# Patient Record
Sex: MALE | Race: WHITE | Employment: OTHER | URBAN - METROPOLITAN AREA
[De-identification: names, ages, dates, MRNs, and addresses within clinical notes are randomized per-mention and may not be internally consistent; named-entity substitution may affect disease eponyms.]

---

## 2017-01-10 ENCOUNTER — OFFICE VISIT (OUTPATIENT)
Dept: FAMILY MEDICINE CLINIC | Age: 77
End: 2017-01-10

## 2017-01-10 VITALS
DIASTOLIC BLOOD PRESSURE: 64 MMHG | HEART RATE: 79 BPM | HEIGHT: 67 IN | BODY MASS INDEX: 31.71 KG/M2 | RESPIRATION RATE: 16 BRPM | OXYGEN SATURATION: 97 % | SYSTOLIC BLOOD PRESSURE: 118 MMHG | WEIGHT: 202 LBS

## 2017-01-10 DIAGNOSIS — D32.9 MENINGIOMA (HCC): ICD-10-CM

## 2017-01-10 DIAGNOSIS — M51.36 DDD (DEGENERATIVE DISC DISEASE), LUMBAR: Chronic | ICD-10-CM

## 2017-01-10 DIAGNOSIS — E78.2 MIXED HYPERTRIGLYCERIDEMIA: Chronic | ICD-10-CM

## 2017-01-10 DIAGNOSIS — D64.89 ANEMIA DUE TO OTHER CAUSE, NOT CLASSIFIED: Chronic | ICD-10-CM

## 2017-01-10 DIAGNOSIS — K21.9 GASTROESOPHAGEAL REFLUX DISEASE WITHOUT ESOPHAGITIS: Chronic | ICD-10-CM

## 2017-01-10 DIAGNOSIS — I10 BENIGN ESSENTIAL HYPERTENSION: Primary | Chronic | ICD-10-CM

## 2017-01-10 DIAGNOSIS — D70.9 NEUTROPENIA, UNSPECIFIED TYPE (HCC): Chronic | ICD-10-CM

## 2017-01-10 DIAGNOSIS — R73.01 IMPAIRED FASTING GLUCOSE: Chronic | ICD-10-CM

## 2017-01-10 PROCEDURE — 3288F FALL RISK ASSESSMENT DOCD: CPT | Performed by: FAMILY MEDICINE

## 2017-01-10 PROCEDURE — 99214 OFFICE O/P EST MOD 30 MIN: CPT | Performed by: FAMILY MEDICINE

## 2017-01-10 RX ORDER — RANITIDINE 150 MG/1
150 TABLET ORAL 2 TIMES DAILY
COMMUNITY
End: 2018-01-16 | Stop reason: ALTCHOICE

## 2017-01-20 DIAGNOSIS — I10 BENIGN ESSENTIAL HYPERTENSION: Chronic | ICD-10-CM

## 2017-01-20 RX ORDER — AMLODIPINE BESYLATE 5 MG/1
TABLET ORAL
Qty: 90 TABLET | Refills: 0 | Status: SHIPPED | OUTPATIENT
Start: 2017-01-20 | End: 2017-08-07 | Stop reason: SDUPTHER

## 2017-01-20 RX ORDER — LISINOPRIL 40 MG/1
TABLET ORAL
Qty: 90 TABLET | Refills: 1 | Status: SHIPPED | OUTPATIENT
Start: 2017-01-20 | End: 2017-10-02 | Stop reason: SDUPTHER

## 2017-01-20 RX ORDER — HYDROCHLOROTHIAZIDE 25 MG/1
TABLET ORAL
Qty: 90 TABLET | Refills: 0 | Status: SHIPPED | OUTPATIENT
Start: 2017-01-20 | End: 2017-08-07 | Stop reason: SDUPTHER

## 2017-01-23 ENCOUNTER — TELEPHONE (OUTPATIENT)
Dept: CARDIOLOGY CLINIC | Age: 77
End: 2017-01-23

## 2017-03-16 RX ORDER — TRIAMCINOLONE ACETONIDE 1 MG/G
CREAM TOPICAL
Qty: 454 G | Refills: 0 | Status: SHIPPED | OUTPATIENT
Start: 2017-03-16 | End: 2017-06-21 | Stop reason: SDUPTHER

## 2017-06-21 RX ORDER — TRIAMCINOLONE ACETONIDE 1 MG/G
CREAM TOPICAL
Qty: 454 G | Refills: 0 | Status: SHIPPED | OUTPATIENT
Start: 2017-06-21 | End: 2017-08-30 | Stop reason: SDUPTHER

## 2017-06-22 ENCOUNTER — TELEPHONE (OUTPATIENT)
Dept: FAMILY MEDICINE CLINIC | Age: 77
End: 2017-06-22

## 2017-06-22 DIAGNOSIS — N41.9 INFLAMMATORY DISEASE OF PROSTATE: ICD-10-CM

## 2017-06-22 DIAGNOSIS — N41.1 CHRONIC PROSTATITIS: ICD-10-CM

## 2017-06-22 DIAGNOSIS — Z12.5 PROSTATE CANCER SCREENING: ICD-10-CM

## 2017-06-22 DIAGNOSIS — N40.0 BENIGN PROSTATIC HYPERPLASIA, PRESENCE OF LOWER URINARY TRACT SYMPTOMS UNSPECIFIED, UNSPECIFIED MORPHOLOGY: Primary | Chronic | ICD-10-CM

## 2017-06-27 ENCOUNTER — OFFICE VISIT (OUTPATIENT)
Dept: NEUROLOGY | Age: 77
End: 2017-06-27

## 2017-06-27 VITALS
HEIGHT: 67 IN | SYSTOLIC BLOOD PRESSURE: 126 MMHG | BODY MASS INDEX: 31.55 KG/M2 | WEIGHT: 201 LBS | HEART RATE: 80 BPM | DIASTOLIC BLOOD PRESSURE: 82 MMHG

## 2017-06-27 DIAGNOSIS — G25.9 EXTRAPYRAMIDAL SYNDROME: Primary | ICD-10-CM

## 2017-06-27 DIAGNOSIS — D32.9 MENINGIOMA (HCC): Chronic | ICD-10-CM

## 2017-06-27 DIAGNOSIS — G31.84 MILD COGNITIVE IMPAIRMENT, SO STATED: ICD-10-CM

## 2017-06-27 PROCEDURE — 99214 OFFICE O/P EST MOD 30 MIN: CPT | Performed by: PSYCHIATRY & NEUROLOGY

## 2017-06-27 RX ORDER — CARBIDOPA AND LEVODOPA 25; 100 MG/1; MG/1
TABLET, EXTENDED RELEASE ORAL
Qty: 360 TABLET | Refills: 0 | Status: SHIPPED | OUTPATIENT
Start: 2017-06-27 | End: 2017-10-09 | Stop reason: SDUPTHER

## 2017-06-28 ENCOUNTER — NURSE ONLY (OUTPATIENT)
Dept: FAMILY MEDICINE CLINIC | Age: 77
End: 2017-06-28

## 2017-06-28 DIAGNOSIS — N41.1 CHRONIC PROSTATITIS: ICD-10-CM

## 2017-06-28 DIAGNOSIS — N41.9 INFLAMMATORY DISEASE OF PROSTATE: ICD-10-CM

## 2017-06-28 LAB — PROSTATE SPECIFIC ANTIGEN: 1.89 NG/ML (ref 0–4)

## 2017-06-28 PROCEDURE — 36415 COLL VENOUS BLD VENIPUNCTURE: CPT | Performed by: FAMILY MEDICINE

## 2017-06-29 ENCOUNTER — TELEPHONE (OUTPATIENT)
Dept: FAMILY MEDICINE CLINIC | Age: 77
End: 2017-06-29

## 2017-06-29 DIAGNOSIS — L11.1 GROVER'S DISEASE: Primary | Chronic | ICD-10-CM

## 2017-06-30 ENCOUNTER — TELEPHONE (OUTPATIENT)
Dept: FAMILY MEDICINE CLINIC | Age: 77
End: 2017-06-30

## 2017-07-05 ENCOUNTER — TELEPHONE (OUTPATIENT)
Dept: FAMILY MEDICINE CLINIC | Age: 77
End: 2017-07-05

## 2017-07-12 ENCOUNTER — OFFICE VISIT (OUTPATIENT)
Dept: FAMILY MEDICINE CLINIC | Age: 77
End: 2017-07-12

## 2017-07-12 VITALS
BODY MASS INDEX: 31.2 KG/M2 | SYSTOLIC BLOOD PRESSURE: 128 MMHG | HEART RATE: 70 BPM | DIASTOLIC BLOOD PRESSURE: 74 MMHG | HEIGHT: 67 IN | WEIGHT: 198.8 LBS | RESPIRATION RATE: 16 BRPM | OXYGEN SATURATION: 98 %

## 2017-07-12 DIAGNOSIS — G31.84 MILD COGNITIVE IMPAIRMENT, SO STATED: ICD-10-CM

## 2017-07-12 DIAGNOSIS — J45.901 ASTHMATIC BRONCHITIS, UNSPECIFIED ASTHMA SEVERITY, WITH ACUTE EXACERBATION: ICD-10-CM

## 2017-07-12 DIAGNOSIS — E78.2 MIXED HYPERTRIGLYCERIDEMIA: Chronic | ICD-10-CM

## 2017-07-12 DIAGNOSIS — G25.9 EXTRAPYRAMIDAL SYNDROME: Chronic | ICD-10-CM

## 2017-07-12 DIAGNOSIS — R73.01 IMPAIRED FASTING GLUCOSE: Chronic | ICD-10-CM

## 2017-07-12 DIAGNOSIS — K21.9 GASTROESOPHAGEAL REFLUX DISEASE WITHOUT ESOPHAGITIS: Chronic | ICD-10-CM

## 2017-07-12 DIAGNOSIS — I10 BENIGN ESSENTIAL HYPERTENSION: Chronic | ICD-10-CM

## 2017-07-12 DIAGNOSIS — D64.89 ANEMIA DUE TO OTHER CAUSE, NOT CLASSIFIED: Primary | Chronic | ICD-10-CM

## 2017-07-12 DIAGNOSIS — D70.9 NEUTROPENIA, UNSPECIFIED TYPE (HCC): Chronic | ICD-10-CM

## 2017-07-12 DIAGNOSIS — Z91.81 AT RISK FOR FALL DUE TO COMORBID CONDITION: ICD-10-CM

## 2017-07-12 PROCEDURE — 99214 OFFICE O/P EST MOD 30 MIN: CPT | Performed by: FAMILY MEDICINE

## 2017-07-12 ASSESSMENT — ENCOUNTER SYMPTOMS
SHORTNESS OF BREATH: 0
CHOKING: 0
CHEST TIGHTNESS: 0
COUGH: 0
WHEEZING: 1

## 2017-07-12 ASSESSMENT — PATIENT HEALTH QUESTIONNAIRE - PHQ9
1. LITTLE INTEREST OR PLEASURE IN DOING THINGS: 0
SUM OF ALL RESPONSES TO PHQ QUESTIONS 1-9: 0
SUM OF ALL RESPONSES TO PHQ9 QUESTIONS 1 & 2: 0
2. FEELING DOWN, DEPRESSED OR HOPELESS: 0

## 2017-07-17 ENCOUNTER — HOSPITAL ENCOUNTER (OUTPATIENT)
Dept: OTHER | Age: 77
Discharge: HOME | End: 2017-07-17
Attending: FAMILY MEDICINE | Admitting: FAMILY MEDICINE

## 2017-07-17 DIAGNOSIS — D64.89 ANEMIA DUE TO OTHER CAUSE, NOT CLASSIFIED: Chronic | ICD-10-CM

## 2017-07-17 DIAGNOSIS — R73.01 IMPAIRED FASTING GLUCOSE: Chronic | ICD-10-CM

## 2017-07-17 DIAGNOSIS — E78.2 MIXED HYPERTRIGLYCERIDEMIA: Chronic | ICD-10-CM

## 2017-07-17 DIAGNOSIS — I10 BENIGN ESSENTIAL HYPERTENSION: Chronic | ICD-10-CM

## 2017-07-17 DIAGNOSIS — D70.9 NEUTROPENIA, UNSPECIFIED TYPE (HCC): Chronic | ICD-10-CM

## 2017-07-17 DIAGNOSIS — G31.84 MILD COGNITIVE IMPAIRMENT, SO STATED: ICD-10-CM

## 2017-07-17 LAB
A/G RATIO: 1.7 (ref 1.1–2.2)
ALBUMIN SERPL-MCNC: 4 G/DL (ref 3.4–5)
ALP BLD-CCNC: 58 U/L (ref 40–129)
ALT SERPL-CCNC: 19 U/L (ref 10–40)
ANION GAP SERPL CALCULATED.3IONS-SCNC: 12 MMOL/L (ref 3–16)
AST SERPL-CCNC: 19 U/L (ref 15–37)
BASOPHILS ABSOLUTE: 0 K/UL (ref 0–0.2)
BASOPHILS RELATIVE PERCENT: 0.4 %
BILIRUB SERPL-MCNC: 0.6 MG/DL (ref 0–1)
BUN BLDV-MCNC: 16 MG/DL (ref 7–20)
CALCIUM SERPL-MCNC: 9.3 MG/DL (ref 8.3–10.6)
CHLORIDE BLD-SCNC: 101 MMOL/L (ref 99–110)
CHOLESTEROL, TOTAL: 169 MG/DL (ref 0–199)
CO2: 28 MMOL/L (ref 21–32)
CREAT SERPL-MCNC: 1 MG/DL (ref 0.8–1.3)
EOSINOPHILS ABSOLUTE: 0.2 K/UL (ref 0–0.6)
EOSINOPHILS RELATIVE PERCENT: 2.5 %
GFR AFRICAN AMERICAN: >60
GFR NON-AFRICAN AMERICAN: >60
GLOBULIN: 2.4 G/DL
GLUCOSE BLD-MCNC: 118 MG/DL (ref 70–99)
HCT VFR BLD CALC: 44.4 % (ref 40.5–52.5)
HDLC SERPL-MCNC: 73 MG/DL (ref 40–60)
HEMOGLOBIN: 15.2 G/DL (ref 13.5–17.5)
HOMOCYSTEINE: 9 UMOL/L (ref 0–10)
LDL CHOLESTEROL CALCULATED: 74 MG/DL
LYMPHOCYTES ABSOLUTE: 2 K/UL (ref 1–5.1)
LYMPHOCYTES RELATIVE PERCENT: 33.8 %
MCH RBC QN AUTO: 35.4 PG (ref 26–34)
MCHC RBC AUTO-ENTMCNC: 34.1 G/DL (ref 31–36)
MCV RBC AUTO: 103.8 FL (ref 80–100)
MONOCYTES ABSOLUTE: 0.4 K/UL (ref 0–1.3)
MONOCYTES RELATIVE PERCENT: 6.7 %
NEUTROPHILS ABSOLUTE: 3.4 K/UL (ref 1.7–7.7)
NEUTROPHILS RELATIVE PERCENT: 56.6 %
PDW BLD-RTO: 12.9 % (ref 12.4–15.4)
PLATELET # BLD: 227 K/UL (ref 135–450)
PMV BLD AUTO: 8.6 FL (ref 5–10.5)
POTASSIUM SERPL-SCNC: 3.9 MMOL/L (ref 3.5–5.1)
RBC # BLD: 4.28 M/UL (ref 4.2–5.9)
SODIUM BLD-SCNC: 141 MMOL/L (ref 136–145)
TOTAL PROTEIN: 6.4 G/DL (ref 6.4–8.2)
TRIGL SERPL-MCNC: 111 MG/DL (ref 0–150)
VLDLC SERPL CALC-MCNC: 22 MG/DL
WBC # BLD: 6 K/UL (ref 4–11)

## 2017-08-02 ENCOUNTER — OFFICE VISIT (OUTPATIENT)
Dept: FAMILY MEDICINE CLINIC | Age: 77
End: 2017-08-02

## 2017-08-02 VITALS
SYSTOLIC BLOOD PRESSURE: 116 MMHG | HEART RATE: 94 BPM | WEIGHT: 200.2 LBS | DIASTOLIC BLOOD PRESSURE: 68 MMHG | BODY MASS INDEX: 31.42 KG/M2 | HEIGHT: 67 IN | RESPIRATION RATE: 16 BRPM | TEMPERATURE: 98.8 F | OXYGEN SATURATION: 95 %

## 2017-08-02 DIAGNOSIS — R79.89 ELEVATED HOMOCYSTEINE: Chronic | ICD-10-CM

## 2017-08-02 DIAGNOSIS — R73.01 IMPAIRED FASTING GLUCOSE: Chronic | ICD-10-CM

## 2017-08-02 DIAGNOSIS — J45.21 ASTHMATIC BRONCHITIS, MILD INTERMITTENT, WITH ACUTE EXACERBATION: Primary | ICD-10-CM

## 2017-08-02 DIAGNOSIS — D64.89 ANEMIA DUE TO OTHER CAUSE, NOT CLASSIFIED: Chronic | ICD-10-CM

## 2017-08-02 DIAGNOSIS — E78.2 MIXED HYPERTRIGLYCERIDEMIA: Chronic | ICD-10-CM

## 2017-08-02 PROCEDURE — 99214 OFFICE O/P EST MOD 30 MIN: CPT | Performed by: FAMILY MEDICINE

## 2017-08-02 PROCEDURE — 94640 AIRWAY INHALATION TREATMENT: CPT | Performed by: FAMILY MEDICINE

## 2017-08-02 RX ORDER — GUAIFENESIN AND CODEINE PHOSPHATE 100; 10 MG/5ML; MG/5ML
5 SOLUTION ORAL EVERY 4 HOURS PRN
Qty: 120 ML | Refills: 0 | Status: SHIPPED | OUTPATIENT
Start: 2017-08-02 | End: 2017-11-08 | Stop reason: ALTCHOICE

## 2017-08-02 RX ORDER — BUDESONIDE AND FORMOTEROL FUMARATE DIHYDRATE 160; 4.5 UG/1; UG/1
2 AEROSOL RESPIRATORY (INHALATION) 2 TIMES DAILY
Qty: 1 INHALER | Refills: 1 | Status: SHIPPED | OUTPATIENT
Start: 2017-08-02 | End: 2022-01-27 | Stop reason: ALTCHOICE

## 2017-08-02 ASSESSMENT — ENCOUNTER SYMPTOMS
SORE THROAT: 1
SHORTNESS OF BREATH: 1
RHINORRHEA: 0
COUGH: 1
HEMOPTYSIS: 0
HEARTBURN: 1
WHEEZING: 1

## 2017-08-03 RX ORDER — ALBUTEROL SULFATE 2.5 MG/3ML
2.5 SOLUTION RESPIRATORY (INHALATION) ONCE
Status: DISCONTINUED | OUTPATIENT
Start: 2017-08-03 | End: 2017-11-08

## 2017-08-07 RX ORDER — HYDROCHLOROTHIAZIDE 25 MG/1
25 TABLET ORAL DAILY
Qty: 90 TABLET | Refills: 1 | Status: SHIPPED | OUTPATIENT
Start: 2017-08-07 | End: 2018-05-09 | Stop reason: DRUGHIGH

## 2017-08-07 RX ORDER — AMLODIPINE BESYLATE 5 MG/1
5 TABLET ORAL DAILY
Qty: 90 TABLET | Refills: 1 | Status: SHIPPED | OUTPATIENT
Start: 2017-08-07 | End: 2018-01-29 | Stop reason: SDUPTHER

## 2017-08-30 RX ORDER — TRIAMCINOLONE ACETONIDE 1 MG/G
CREAM TOPICAL
Qty: 454 G | Refills: 0 | Status: SHIPPED | OUTPATIENT
Start: 2017-08-30 | End: 2018-01-22 | Stop reason: SDUPTHER

## 2017-10-02 DIAGNOSIS — I10 BENIGN ESSENTIAL HYPERTENSION: Chronic | ICD-10-CM

## 2017-10-02 RX ORDER — LISINOPRIL 40 MG/1
TABLET ORAL
Qty: 90 TABLET | Refills: 1 | Status: SHIPPED | OUTPATIENT
Start: 2017-10-02 | End: 2018-04-07 | Stop reason: SDUPTHER

## 2017-10-04 ENCOUNTER — TELEPHONE (OUTPATIENT)
Dept: FAMILY MEDICINE CLINIC | Age: 77
End: 2017-10-04

## 2017-10-04 NOTE — TELEPHONE ENCOUNTER
I can put the referral through insurance, just unsure as to what diagnosis you might want to use, and if it is ok to do referral?

## 2017-10-04 NOTE — TELEPHONE ENCOUNTER
Patient is having trouble with hearing even with the use of his hearing aides  Would like to get a referral to Dr Norbert Olguin  Patient has [employer]  Please let them know when this is completed

## 2017-10-09 ENCOUNTER — OFFICE VISIT (OUTPATIENT)
Dept: NEUROLOGY | Age: 77
End: 2017-10-09

## 2017-10-09 VITALS
SYSTOLIC BLOOD PRESSURE: 114 MMHG | WEIGHT: 197 LBS | HEIGHT: 67 IN | BODY MASS INDEX: 30.92 KG/M2 | DIASTOLIC BLOOD PRESSURE: 80 MMHG | HEART RATE: 80 BPM

## 2017-10-09 DIAGNOSIS — G31.84 MILD COGNITIVE IMPAIRMENT, SO STATED: Chronic | ICD-10-CM

## 2017-10-09 DIAGNOSIS — D32.9 MENINGIOMA (HCC): Chronic | ICD-10-CM

## 2017-10-09 DIAGNOSIS — G25.9 EXTRAPYRAMIDAL SYNDROME: Primary | Chronic | ICD-10-CM

## 2017-10-09 PROCEDURE — 99214 OFFICE O/P EST MOD 30 MIN: CPT | Performed by: PSYCHIATRY & NEUROLOGY

## 2017-10-09 RX ORDER — DONEPEZIL HYDROCHLORIDE 5 MG/1
5 TABLET, FILM COATED ORAL NIGHTLY
Qty: 30 TABLET | Refills: 0 | Status: SHIPPED | OUTPATIENT
Start: 2017-10-09 | End: 2017-11-27 | Stop reason: SDUPTHER

## 2017-10-09 RX ORDER — CARBIDOPA AND LEVODOPA 25; 100 MG/1; MG/1
TABLET, EXTENDED RELEASE ORAL
Qty: 360 TABLET | Refills: 0 | Status: SHIPPED | OUTPATIENT
Start: 2017-10-09 | End: 2018-01-09 | Stop reason: SDUPTHER

## 2017-10-09 NOTE — PROGRESS NOTES
lumbar     L5 - S1    Diverticulosis of colon     Elevated homocysteine ([facility] [address] 12/2010    11.39     Encephalitis 1957    hospitalized 3 weeks.    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- no flare up at this time 8/65/2014    Hearing loss d/t noise     bilateral aids    Impaired fasting glucose     Leukopenia     myelodysplasia/ macrocytic anemia stable 5-6 yrs, fmd monitoring no bone marrow testdon, confirmed with lab work    Low back pain on left side with sciatica 7/03/7548    Metabolic syndrome     Mixed hypertriglyceridemia     Nausea & vomiting     Plantar fasciitis, bilateral 12/1/2012    L>R     Pneumonia     recurrent    Prostatitis 1970s    Tubulovillous adenoma     of colon    Vitamin D deficiency      Family History   Problem Relation Age of Onset    Diabetes Mother     Hypertension Mother     Stroke Mother 80    Breast Cancer Mother     Heart Surgery Mother      heart valve replacement    Dementia Mother 80    Prostate Cancer Father     Cancer Father     Diabetes Maternal Grandmother     Stroke Maternal Grandfather     Stroke Paternal Grandfather     Other Sister      DDD C & T spine    Scoliosis Sister     Liver Disease Sister      hepatitits    Mental Retardation Brother      birth anoxia    Obesity Brother     Seizures Brother     Parkinsonism Brother 72    Sudden Death Maternal Uncle     Coronary Art Dis Maternal Uncle     Heart Attack Maternal Uncle     Other Son      Benign brain tumor     Social History     Social History    Marital status:      Spouse name: Daniela Key Number of children: 2    Years of education: 23     Occupational History    RETIRED PSYCHOLOGIST 2009      Social History Main Topics    Smoking status: Never Smoker    Smokeless tobacco: Never Used    Alcohol use 3.5 oz/week     7 Standard

## 2017-10-13 DIAGNOSIS — E78.2 MIXED HYPERTRIGLYCERIDEMIA: Chronic | ICD-10-CM

## 2017-10-13 RX ORDER — SIMVASTATIN 10 MG
10 TABLET ORAL NIGHTLY
Qty: 90 TABLET | Refills: 3 | Status: SHIPPED | OUTPATIENT
Start: 2017-10-13 | End: 2018-07-10 | Stop reason: SDUPTHER

## 2017-10-18 ENCOUNTER — TELEPHONE (OUTPATIENT)
Dept: NEUROLOGY | Age: 77
End: 2017-10-18

## 2017-10-18 NOTE — TELEPHONE ENCOUNTER
Wife called. Aricept started one week ago. Pt has developed bladder control issues - Are these related to Aricept? ?? Per Dr. Lazarus Karma, he does not think so, but patient should D/C Aricept for one week. If issues improve, possibly due to Aricept. They should let us know.

## 2017-10-30 ENCOUNTER — TELEPHONE (OUTPATIENT)
Dept: FAMILY MEDICINE CLINIC | Age: 77
End: 2017-10-30

## 2017-10-30 NOTE — TELEPHONE ENCOUNTER
Patient has an appointment on Wednesday 11/1/17 with Dr Lida Santos (ENT)  Patient has Edidre Money and will require a referral   Please take care of this prior to Wednesday

## 2017-11-01 ENCOUNTER — OFFICE VISIT (OUTPATIENT)
Dept: ENT CLINIC | Age: 77
End: 2017-11-01

## 2017-11-01 VITALS
WEIGHT: 200 LBS | SYSTOLIC BLOOD PRESSURE: 110 MMHG | DIASTOLIC BLOOD PRESSURE: 83 MMHG | HEART RATE: 64 BPM | BODY MASS INDEX: 31.32 KG/M2

## 2017-11-01 DIAGNOSIS — H93.13 SUBJECTIVE TINNITUS OF BOTH EARS: ICD-10-CM

## 2017-11-01 DIAGNOSIS — H91.93 BILATERAL HEARING LOSS, UNSPECIFIED HEARING LOSS TYPE: Primary | ICD-10-CM

## 2017-11-01 PROCEDURE — G8427 DOCREV CUR MEDS BY ELIG CLIN: HCPCS | Performed by: OTOLARYNGOLOGY

## 2017-11-01 PROCEDURE — 4040F PNEUMOC VAC/ADMIN/RCVD: CPT | Performed by: OTOLARYNGOLOGY

## 2017-11-01 PROCEDURE — 3017F COLORECTAL CA SCREEN DOC REV: CPT | Performed by: OTOLARYNGOLOGY

## 2017-11-01 PROCEDURE — G8484 FLU IMMUNIZE NO ADMIN: HCPCS | Performed by: OTOLARYNGOLOGY

## 2017-11-01 PROCEDURE — 99203 OFFICE O/P NEW LOW 30 MIN: CPT | Performed by: OTOLARYNGOLOGY

## 2017-11-01 PROCEDURE — G8417 CALC BMI ABV UP PARAM F/U: HCPCS | Performed by: OTOLARYNGOLOGY

## 2017-11-01 PROCEDURE — G8598 ASA/ANTIPLAT THER USED: HCPCS | Performed by: OTOLARYNGOLOGY

## 2017-11-01 PROCEDURE — 1036F TOBACCO NON-USER: CPT | Performed by: OTOLARYNGOLOGY

## 2017-11-01 PROCEDURE — 1123F ACP DISCUSS/DSCN MKR DOCD: CPT | Performed by: OTOLARYNGOLOGY

## 2017-11-01 NOTE — PROGRESS NOTES
[facility] ENT       NEW PATIENT VISIT    PCP:  Bi Rodriguez DO    REFERRED BY:    [facility] ED      CHIEF COMPLAINT:  Chief Complaint   Patient presents with    Hearing Loss       HISTORY OF PRESENT ILLNESS:       (Location, Quality, Severity, Duration, Timing, Context, Modifying factors, Associated symptoms)  Michael Ocasio is a 78 y.o. male referred by Dr. Debbie Matute for ENT consultation and evaluation of a problem located in both ears, of hearing loss, of moderate severity, constant, for about 5 years. Has had hearing aids for 3 years. The hearing aids have not worked well for him. They have been replaced 3 or 4 times in the past three years. \"And they are still not working right. \"  [place] and used loud equipment. Has ringing tinnitus, sometimes, but not very often. He saw Dr. Odalis Meza, Audiologist, about 6-8 months ago, for [facility], in [place]. Most of the hearing aid work has been handled on the telephone, \"and that's the problem. \"      REVIEW OF SYSTEMS:    CONSTITUTIONAL:  Denied fever and chills. Denied unexplained weight loss. EARS, NOSE, THROAT:  Denied otorrhea, otalgia, epistaxis, nasal dyspnea, rhinorrhea, sore throat and hoarseness. PAST MEDICAL HISTORY:    Past Medical History:   Diagnosis Date    Allergic conjunctivitis of both eyes     Anemia     macrocytic    Asthmatic bronchitis     recurrent, and pneumonia    BBB (bundle branch block)     Right    Benign essential hypertension     BPH (benign prostatic hyperplasia)     Bradycardia 3/29/2015    hospital x 36 hours.  Stopped Verapamil    Colon polyp 2/18/2015    q 3 yr    Colonic polyp     Coronary atheroma 2006 or 2007    mild plaques -no clinical CAD wkup  for irreg heart beat - testing neg dr. Roxana Pavon not seen for yrs    DDD (degenerative disc disease), lumbar     L5 - S1    Diverticulosis of colon     Elevated homocysteine ([facility] [address] 12/2010    11.39  Encephalitis 1957    hospitalized 3 weeks. 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- no flare up at this time 8/65/2014    Hearing loss d/t noise     bilateral aids    Impaired fasting glucose     Leukopenia     myelodysplasia/ macrocytic anemia stable 5-6 yrs, fmd monitoring no bone marrow testdon, confirmed with lab work    Low back pain on left side with sciatica 9/66/5648    Metabolic syndrome     Mixed hypertriglyceridemia     Nausea & vomiting     Plantar fasciitis, bilateral 12/1/2012    L>R     Pneumonia     recurrent    Prostatitis 1970s    Tubulovillous adenoma     of colon    Vitamin D deficiency          Past Surgical History:   Procedure Laterality Date    CATARACT REMOVAL WITH IMPLANT  bilateral    COLONOSCOPY  2/18/2015    1 polyp, q 3 years    CYSTOSCOPY      EYE SURGERY Right 12/2016    laser to implant lens    EYE SURGERY Left 2015    laser to implant lens    LUMBAR LAMINECTOMY  8-6-2014    microlumbar laminectomy L4 L5    TURP  08/2007    for obstruction    TURP  4/15/2013     & Cystoscopy     WISDOM TOOTH EXTRACTION  1960/1970         EXAMINATION:    VITALS SIGNS reviewed. Vitals:    11/01/17 1528   BP: 110/83   Site: Left Arm   Position: Sitting   Cuff Size: Large Adult   Pulse: 64   Weight: 200 lb (90.7 kg)     GENERAL. APPEARANCE:  Well developed, well nourished, no apparent distress, no apparent deformities. ABILITY TO COMMUNICATE/QUALITY OF VOICE:  Communicated without difficulty. Normal voice. INSPECTION, HEAD AND FACE:  Normal overall appearance, with no scars, lesions or masses. SINUSES:  The maxillary and frontal sinuses were nontender, bilaterally. SALIVARY GLANDS:  Parotid, submandibular and sublingual glands were normal.  FACIAL STRENGTH, MOTION:  Normal and equal for all five branches bilaterally.   EYES: Extraocular motion:  intact, normal primary gaze alignment. HEARING ASSESSMENT:  Finger rub:  Able to hear finger rub bilaterally. Tuning fork tests, [address] tuning fork:  [place] midline. Rinne air > bone bilaterally. EARS, OTOSCOPY: The TMs and EACs appeared to be normal bilaterally. EXTERNAL EAR/NOSE:  Normal pinnae and mastoids. Normal external nose. NOSE:  The nasal septum was midline. The inferior turbinates were normal.  The nasal mucosa and secretions were normal.  No pus or polyps were seen. LIPS, TEETH AND GUMS:  Normal.  (+) OROPHARYNX/ORALCAVITY:  Post tonsillectomy. Oral mucosa, hard and soft palates, tongue, and pharynx were normal.  NECK:  No masses or tenderness. No abnormal appearance, asymmetry or tracheal deviation. Laryngeal cartilages and hyoid bone were normal to palpation. THYROID:  No nodules, enlargement, tenderness or masses. LYMPH NODES, CERVICAL, FACIAL AND SUPRACLAVICULAR:  No lymphadenopathy. IMPRESSION / Tanna Gusman / Brad Teague:       Kevin Juan was seen today for hearing loss. Diagnoses and all orders for this visit:    Bilateral hearing loss, unspecified hearing loss type    Subjective tinnitus of both ears         RECOMMENDATIONS/PLAN:    1. Review previous hearing tests to see if surgery is an option. We will contact his hearing aid provider to see if we can obtain hearing test reports for the past 3-5 years. If unobtainable, will need a hearing test to be done here. Then will need a FU appointment with me to review the results. 2. Return for recheck and follow-up after hearing test, or any further Ear Nose Throat or Sinus problems.

## 2017-11-02 ENCOUNTER — TELEPHONE (OUTPATIENT)
Dept: ENT CLINIC | Age: 77
End: 2017-11-02

## 2017-11-07 ENCOUNTER — OFFICE VISIT (OUTPATIENT)
Dept: ENT CLINIC | Age: 77
End: 2017-11-07

## 2017-11-07 ENCOUNTER — OFFICE VISIT (OUTPATIENT)
Dept: NEUROLOGY | Age: 77
End: 2017-11-07

## 2017-11-07 VITALS
HEART RATE: 85 BPM | HEIGHT: 67 IN | SYSTOLIC BLOOD PRESSURE: 107 MMHG | WEIGHT: 200 LBS | BODY MASS INDEX: 31.39 KG/M2 | DIASTOLIC BLOOD PRESSURE: 75 MMHG

## 2017-11-07 DIAGNOSIS — G25.9 EXTRAPYRAMIDAL SYNDROME: Primary | Chronic | ICD-10-CM

## 2017-11-07 DIAGNOSIS — H90.3 SENSORY HEARING LOSS, BILATERAL: Primary | ICD-10-CM

## 2017-11-07 DIAGNOSIS — G31.84 MILD COGNITIVE IMPAIRMENT: Chronic | ICD-10-CM

## 2017-11-07 DIAGNOSIS — D32.9 MENINGIOMA (HCC): Chronic | ICD-10-CM

## 2017-11-07 PROCEDURE — 3017F COLORECTAL CA SCREEN DOC REV: CPT | Performed by: PSYCHIATRY & NEUROLOGY

## 2017-11-07 PROCEDURE — 4040F PNEUMOC VAC/ADMIN/RCVD: CPT | Performed by: PSYCHIATRY & NEUROLOGY

## 2017-11-07 PROCEDURE — 1123F ACP DISCUSS/DSCN MKR DOCD: CPT | Performed by: PSYCHIATRY & NEUROLOGY

## 2017-11-07 PROCEDURE — 92550 TYMPANOMETRY & REFLEX THRESH: CPT | Performed by: AUDIOLOGIST

## 2017-11-07 PROCEDURE — G8417 CALC BMI ABV UP PARAM F/U: HCPCS | Performed by: PSYCHIATRY & NEUROLOGY

## 2017-11-07 PROCEDURE — 99214 OFFICE O/P EST MOD 30 MIN: CPT | Performed by: PSYCHIATRY & NEUROLOGY

## 2017-11-07 PROCEDURE — G8598 ASA/ANTIPLAT THER USED: HCPCS | Performed by: PSYCHIATRY & NEUROLOGY

## 2017-11-07 PROCEDURE — 92557 COMPREHENSIVE HEARING TEST: CPT | Performed by: AUDIOLOGIST

## 2017-11-07 PROCEDURE — G8427 DOCREV CUR MEDS BY ELIG CLIN: HCPCS | Performed by: PSYCHIATRY & NEUROLOGY

## 2017-11-07 PROCEDURE — G8484 FLU IMMUNIZE NO ADMIN: HCPCS | Performed by: PSYCHIATRY & NEUROLOGY

## 2017-11-07 PROCEDURE — 1036F TOBACCO NON-USER: CPT | Performed by: PSYCHIATRY & NEUROLOGY

## 2017-11-07 NOTE — PROGRESS NOTES
Evelyn Green   Neurology followup    Subjective:   CC/HP  History was obtained from the patient   Patient has parkinsonian syndrome. Patient feels that his motor symptoms improved after we increased his Sinemet dose  He never had any significant tremors  Details of his history are as follows: Tino Boyd He has noticed loss of balance. His family has noticed a bland facial expression and his handwriting has become smaller. He has slowness of movement and has difficulty getting up off a low chair or getting out of a car. Patient has had loss of motivation and would rather sit and read or watch TV then get out and do other things. Patient's wife states that he has lost his ability to multitask. For example when he is driving as she could not talk to him to ask a question and his attention would be distracted. Interval changes:  Patient was started on Aricept during the last visit for cognitive impairment. Patient states that he started having some episodes of urinary incontinence after that and so he stopped the Aricept. He did not have any further spells until yesterday when he had another out with urinary incontinence  He is waiting to see his primary care physician and consider seeing a urologist      REVIEW OF SYSTEMS    Constitutional:  []   Chills   [x]  Fatigue   []  Fevers   []  Malaise   []  Weight loss     [] Denies all of the above    Respiratory:   []  Cough    [x]  Shortness of breath         [] Denies all of the above     Cardiovascular:   []  Chest pain    []  Exertional chest pressure/discomfort           [] Palpitations    []  Syncope     [x] Denies all of the above        Past Medical History:   Diagnosis Date    Allergic conjunctivitis of both eyes     Anemia     macrocytic    Asthmatic bronchitis     recurrent, and pneumonia    BBB (bundle branch block)     Right    Benign essential hypertension     BPH (benign prostatic hyperplasia)     Bradycardia 3/29/2015    hospital x 36 hours.  Stopped children: 2    Years of education: 23     Occupational History    RETIRED PSYCHOLOGIST 2009      Social History Main Topics    Smoking status: Never Smoker    Smokeless tobacco: Never Used    Alcohol use 3.5 oz/week     7 Standard drinks or equivalent per week      Comment: 1 glass of wine qhs.  Drug use: No      Comment: Never tried drugs.  Sexual activity: No     Other Topics Concern    None     Social History Narrative    Memory issues 2016. Exercise with Nautilus 20 min 3x/wk &  Walks 20 min 2-3x/wk. 1/10/17.  7/12/17. Objective:  Exam:  /75 (Site: Right Arm, Position: Sitting, Cuff Size: Medium Adult)   Pulse 85   Ht 5' 7\" (1.702 m)   Wt 200 lb (90.7 kg)   BMI 31.32 kg/m²   This is a well-nourished patient in no acute distress  Patient is awake, alert and oriented x3. Speech is Mildly dysarthric. Poor short-term memory  Pupils are equal round reacting to light. Extraocular movements intact. Face symmetrical. Tongue midline. Motor exam shows normal symmetrical strength. Increased tone. No tremors notedDeep tendon reflexes normal. Plantar reflexes downgoing. Sensory exam normal. Coordination normal. Gait slightly unsteady. No carotid bruit. No neck stiffness.         Data :  LABS:  General Labs:    CBC:   Lab Results   Component Value Date    WBC 6.0 07/17/2017    RBC 4.28 07/17/2017    HGB 15.2 07/17/2017    HCT 44.4 07/17/2017    .8 07/17/2017    MCH 35.4 07/17/2017    MCHC 34.1 07/17/2017    RDW 12.9 07/17/2017     07/17/2017    MPV 8.6 07/17/2017     BMP:    Lab Results   Component Value Date     07/17/2017    K 3.9 07/17/2017     07/17/2017    CO2 28 07/17/2017    BUN 16 07/17/2017    LABALBU 4.0 07/17/2017    CREATININE 1.0 07/17/2017    CALCIUM 9.3 07/17/2017    GFRAA >60 07/17/2017    GFRAA 55 04/16/2013    LABGLOM >60 07/17/2017    GLUCOSE 118 07/17/2017    GLUCOSE 107 08/01/2011   TSH was normal  B12 was at the lower end of normal at 338  RADIOLOGY REVIEW:  I have reviewed radiology image(s) and reports(s) of:  MRI brain    Impression :  Extrapyramidal syndrome, Multiple symptoms as detailed above  Mild cognitive impairment, Symptoms slowly getting worse  MRI brain images reviewed  Cortical atrophy noted  There was an incidental falx meningioma in the left frontal region  B12 level was borderline  It is not clear if Aricept caused any of the side effects or if the urinary incontinence was independent of the Aricept. Plan :  Discussed with patient continue Oral-B 12 supplementation  Continue Sinemet 25/100 one tablet at 9 AM, 1 PM, 5 PM and 11 PM  Side effects were discussed. There is no treatment necessary for the incidental meningioma  Patient will see his primary care physician and possibly a urologist to further evaluate the urinary incontinence  Once that issue is resolved then we can reconsider starting him back on the Aricept. Please note a portion of  this chart was generated using dragon dictation software. Although every effort was made to ensure the accuracy of this automated transcription, some errors in transcription may have occurred.

## 2017-11-08 ENCOUNTER — OFFICE VISIT (OUTPATIENT)
Dept: FAMILY MEDICINE CLINIC | Age: 77
End: 2017-11-08

## 2017-11-08 VITALS
HEIGHT: 67 IN | RESPIRATION RATE: 16 BRPM | OXYGEN SATURATION: 97 % | SYSTOLIC BLOOD PRESSURE: 112 MMHG | HEART RATE: 80 BPM | DIASTOLIC BLOOD PRESSURE: 64 MMHG | WEIGHT: 200 LBS | BODY MASS INDEX: 31.39 KG/M2

## 2017-11-08 DIAGNOSIS — I10 BENIGN ESSENTIAL HYPERTENSION: Chronic | ICD-10-CM

## 2017-11-08 DIAGNOSIS — R35.0 FREQUENCY OF URINATION: ICD-10-CM

## 2017-11-08 DIAGNOSIS — N39.41 URGE INCONTINENCE OF URINE: Primary | ICD-10-CM

## 2017-11-08 DIAGNOSIS — G31.84 MILD COGNITIVE IMPAIRMENT: Chronic | ICD-10-CM

## 2017-11-08 LAB
BILIRUBIN, POC: NORMAL
BLOOD URINE, POC: NORMAL
CLARITY, POC: NORMAL
COLOR, POC: NORMAL
GLUCOSE URINE, POC: NORMAL
KETONES, POC: NORMAL
LEUKOCYTE EST, POC: NORMAL
NITRITE, POC: NORMAL
PH, POC: 7
PROTEIN, POC: NORMAL
SPECIFIC GRAVITY, POC: 1.01
UROBILINOGEN, POC: 0.2

## 2017-11-08 PROCEDURE — 3017F COLORECTAL CA SCREEN DOC REV: CPT | Performed by: FAMILY MEDICINE

## 2017-11-08 PROCEDURE — 1036F TOBACCO NON-USER: CPT | Performed by: FAMILY MEDICINE

## 2017-11-08 PROCEDURE — 99214 OFFICE O/P EST MOD 30 MIN: CPT | Performed by: FAMILY MEDICINE

## 2017-11-08 PROCEDURE — 1123F ACP DISCUSS/DSCN MKR DOCD: CPT | Performed by: FAMILY MEDICINE

## 2017-11-08 PROCEDURE — G8417 CALC BMI ABV UP PARAM F/U: HCPCS | Performed by: FAMILY MEDICINE

## 2017-11-08 PROCEDURE — G8427 DOCREV CUR MEDS BY ELIG CLIN: HCPCS | Performed by: FAMILY MEDICINE

## 2017-11-08 PROCEDURE — G8484 FLU IMMUNIZE NO ADMIN: HCPCS | Performed by: FAMILY MEDICINE

## 2017-11-08 PROCEDURE — 81002 URINALYSIS NONAUTO W/O SCOPE: CPT | Performed by: FAMILY MEDICINE

## 2017-11-08 PROCEDURE — G8598 ASA/ANTIPLAT THER USED: HCPCS | Performed by: FAMILY MEDICINE

## 2017-11-08 PROCEDURE — 4040F PNEUMOC VAC/ADMIN/RCVD: CPT | Performed by: FAMILY MEDICINE

## 2017-11-08 NOTE — PATIENT INSTRUCTIONS
food.  Follow-up care is a key part of your treatment and safety. Be sure to make and go to all appointments, and call your doctor if you are having problems. It's also a good idea to know your test results and keep a list of the medicines you take. How can you care for yourself at home? Read food labels  · Read labels on cans and food packages. The labels tell you how much sodium is in each serving. Make sure that you look at the serving size. If you eat more than the serving size, you have eaten more sodium. · Food labels also tell you the Percent Daily Value for sodium. Choose products with low Percent Daily Values for sodium. · Be aware that sodium can come in forms other than salt, including monosodium glutamate (MSG), sodium citrate, and sodium bicarbonate (baking soda). MSG is often added to Asian food. When you eat out, you can sometimes ask for food without MSG or added salt. Buy low-sodium foods  · Buy foods that are labeled \"unsalted\" (no salt added), \"sodium-free\" (less than 5 mg of sodium per serving), or \"low-sodium\" (less than 140 mg of sodium per serving). Foods labeled \"reduced-sodium\" and \"light sodium\" may still have too much sodium. Be sure to read the label to see how much sodium you are getting. · Buy fresh vegetables, or frozen vegetables without added sauces. Buy low-sodium versions of canned vegetables, soups, and other canned goods. Prepare low-sodium meals  · Cut back on the amount of salt you use in cooking. This will help you adjust to the taste. Do not add salt after cooking. One teaspoon of salt has about 2,300 mg of sodium. · Take the salt shaker off the table. · Flavor your food with garlic, lemon juice, onion, vinegar, herbs, and spices. Do not use soy sauce, lite soy sauce, steak sauce, onion salt, garlic salt, celery salt, mustard, or ketchup on your food. · Use low-sodium salad dressings, sauces, and ketchup.  Or make your own salad dressings and sauces without adding salt.  · Use less salt (or none) when recipes call for it. You can often use half the salt a recipe calls for without losing flavor. Other foods such as rice, pasta, and grains do not need added salt. · Rinse canned vegetables, and cook them in fresh water. This removes somebut not allof the salt. · Avoid water that is naturally high in sodium or that has been treated with water softeners, which add sodium. Call your local water company to find out the sodium content of your water supply. If you buy bottled water, read the label and choose a sodium-free brand. Avoid high-sodium foods  · Avoid eating:  ¨ Smoked, cured, salted, and canned meat, fish, and poultry. ¨ Ham, aly, hot dogs, and luncheon meats. ¨ Regular, hard, and processed cheese and regular peanut butter. ¨ Crackers with salted tops, and other salted snack foods such as pretzels, chips, and salted popcorn. ¨ Frozen prepared meals, unless labeled low-sodium. ¨ Canned and dried soups, broths, and bouillon, unless labeled sodium-free or low-sodium. ¨ Canned vegetables, unless labeled sodium-free or low-sodium. ¨ [place] fries, pizza, tacos, and other fast foods. ¨ Pickles, olives, ketchup, and other condiments, especially soy sauce, unless labeled sodium-free or low-sodium. Where can you learn more? Go to [email or web address].[employer]. org and sign in to your [facility] account. Enter Z772 in the [facility] box to learn more about \"Low Sodium Diet (2,000 Milligram): Care Instructions. \"     If you do not have an account, please click on the \"Sign Up Now\" link. Current as of: July 26, 2016  Content Version: 11.3  © 4001-5355 [employer]. Care instructions adapted under license by [facility] ([facility]). If you have questions about a medical condition or this instruction, always ask your healthcare professional. [place] [address] any warranty or liability for your use of this information.

## 2017-11-08 NOTE — PROGRESS NOTES
Subjective:      Patient ID: Michael Ocasio is a 78 y.o. male. Chief Complaint   Patient presents with    Medication Problem     INCONTINENCE WHILE TAKING ARICEPT/STILL HAVING PROBLEMS     HPI   Urinary incontinence  Started on Aricept. He has to wear pull ups due to incontinence. No diarrhea or gastric intestinal problems associated with the Aricept. Had 3 urinary accidents. Always occurs when he is in the car. Used to have the urge and had time to get to the bathroom. Now has the urge and goes with in 5-6 min loses urine. He can't always get out of the car to a place to urinate quick enough. Has learned if he has the urge he has to go. Has not tried any med. Stopped the Aricept 5 mg nightly. Mybetrig. Problem always during the day. Current Outpatient Prescriptions   Medication Sig Dispense Refill    simvastatin (ZOCOR) 10 MG tablet TAKE 1 TABLET BY MOUTH NIGHTLY 90 tablet 3    ZINC PO Take by mouth      BIOTIN FORTE PO Take by mouth      carbidopa-levodopa (SINEMET CR)  MG per extended release tablet Take 1 tablet by mouth 4 times daily 360 tablet 0    lisinopril (PRINIVIL;ZESTRIL) 40 MG tablet TAKE 1 TABLET EVERY DAY FOR HIGH BLOOD PRESSURE 90 tablet 1    triamcinolone (KENALOG) 0.1 % cream APPLY TOPICALLY TO THE AFFECTED AREA(S) TWICE DAILY AS DIRECTED 454 g 0    amLODIPine (NORVASC) 5 MG tablet Take 1 tablet by mouth daily 90 tablet 1    hydrochlorothiazide (HYDRODIURIL) 25 MG tablet Take 1 tablet by mouth daily 90 tablet 1    budesonide-formoterol (SYMBICORT) 160-4.5 MCG/ACT AERO Inhale 2 puffs into the lungs 2 times daily Use spacer. Rinse and spit.  1 Inhaler 1    Cyanocobalamin (VITAMIN B-12 PO) Take by mouth      ranitidine (ZANTAC) 150 MG tablet Take 150 mg by mouth 2 times daily      clobetasol (TEMOVATE) 0.05 % cream APPLY TOPICALLY TWICE DAILY 15 g 5    omeprazole (PRILOSEC) 20 MG capsule Take 1 capsule by mouth daily Indications: Gastroesophageal Reflux Disease (Patient adenopathy. Right cervical: No superficial cervical, no deep cervical and no posterior cervical adenopathy present. Left cervical: No superficial cervical, no deep cervical and no posterior cervical adenopathy present. Neurological: He is alert. Reflex Scores:       Patellar reflexes are 2+ on the right side and 2+ on the left side. Skin: Skin is warm and dry. He is not diaphoretic. No pallor. Psychiatric: He has a normal mood and affect. His speech is normal and behavior is normal. Judgment normal.   Nursing note and vitals reviewed. Vitals:    11/08/17 1648   BP: 112/64   Site: Left Arm   Position: Sitting   Cuff Size: Medium Adult   Pulse: 80   Resp: 16   SpO2: 97%   Weight: 200 lb (90.7 kg)  Comment: WITH SHOES   Height: 5' 7\" (1.702 m)     BP Readings from Last 3 Encounters:   11/08/17 112/64   11/07/17 107/75   11/01/17 110/83     Pulse Readings from Last 3 Encounters:   11/08/17 80   11/07/17 85   11/01/17 64     Wt Readings from Last 3 Encounters:   11/08/17 200 lb (90.7 kg)   11/07/17 200 lb (90.7 kg)   11/01/17 200 lb (90.7 kg)     Body mass index is 31.32 kg/m². Results for POC orders placed in visit on 11/08/17   POCT Urinalysis no Micro   Result Value Ref Range    Color, UA      Clarity, UA      Glucose, UA POC NEG     Bilirubin, UA NEG     Ketones, UA TRACE     Spec Grav, UA 1.015     Blood, UA POC NEG     pH, UA 7.0     Protein, UA POC NEG     Urobilinogen, UA 0.2     Leukocytes, UA NEG     Nitrite, UA NEG      Assessment:      1. Urge incontinence of urine    2. Frequency of urination    3. Benign essential hypertension    4. Mild cognitive impairment          Plan:       - Counseling More Than 50% of the 25 min Appointment Time     Davin Gray was seen today for medication problem.     Diagnoses and all orders for this visit:    Urge incontinence of urine  -     Amb External Referral To Urology  -     Mirabegron ER (MYRBETRIQ) 50 MG TB24; Take 50 mg by mouth every morning    Frequency of urination  -     POCT Urinalysis no Micro  -     Amb External Referral To Urology    Benign essential hypertension  Cut the HCTZ 25 mg in 1/2 and take 1/2 daily in the am with food. Check BP 2x/day for 3 days with the change above. Then once daily alternate am & pm checks and record. For patients in their late 66's and above in age the systolic blood pressure (top number) goal is 120-140 with occasional increases to 150's not concerning. Call if less 100 as low blood pressures increase the risk of falls and injuries. Call also if more than 180 once or staying above 160 more than 1/3 of the time. If systolic blood pressure is staying 100-120 we may need to decrease the blood pressure medicine. Avoid salt and salty foods/snacks during this change. ie use banana chips. Blood pressures have been low normal. Cognition will likely benefit from an increase in blood pressures as above. We may be able to stop the water pill altogether. Memory issues /mild cognitive impairment  Will restart the Aricept if no problems after 2 changes above for 2 weeks. Explained that in theory Aricept is cholinergic and could increase urination and cause diarrhea. Is not having any problems with the latter. Patient Education   Low Sodium Diet (2,000 Milligram): Care Instructions  Your Care Instructions  This will be required while you or no longer on the full dose of water pill.

## 2017-11-14 ENCOUNTER — TELEPHONE (OUTPATIENT)
Dept: ENT CLINIC | Age: 77
End: 2017-11-14

## 2017-11-27 DIAGNOSIS — N39.41 URGE INCONTINENCE OF URINE: ICD-10-CM

## 2017-11-27 DIAGNOSIS — F03.90 DEMENTIA WITHOUT BEHAVIORAL DISTURBANCE, UNSPECIFIED DEMENTIA TYPE: ICD-10-CM

## 2017-11-27 RX ORDER — DONEPEZIL HYDROCHLORIDE 5 MG/1
5 TABLET, FILM COATED ORAL NIGHTLY
Qty: 30 TABLET | Refills: 0 | Status: SHIPPED | OUTPATIENT
Start: 2017-11-27 | End: 2018-01-09 | Stop reason: SDUPTHER

## 2017-11-27 NOTE — TELEPHONE ENCOUNTER
The Myrbetrix is working well , they would like to have it sent to [facility] order pharmacy for a 90 day supply please

## 2017-12-05 ENCOUNTER — OFFICE VISIT (OUTPATIENT)
Dept: ENT CLINIC | Age: 77
End: 2017-12-05

## 2017-12-05 VITALS
HEART RATE: 76 BPM | SYSTOLIC BLOOD PRESSURE: 125 MMHG | DIASTOLIC BLOOD PRESSURE: 76 MMHG | HEIGHT: 68 IN | BODY MASS INDEX: 30.2 KG/M2 | WEIGHT: 199.3 LBS

## 2017-12-05 DIAGNOSIS — H93.13 SUBJECTIVE TINNITUS OF BOTH EARS: ICD-10-CM

## 2017-12-05 DIAGNOSIS — H90.3 BILATERAL SENSORINEURAL HEARING LOSS: Primary | ICD-10-CM

## 2017-12-05 PROCEDURE — 1036F TOBACCO NON-USER: CPT | Performed by: OTOLARYNGOLOGY

## 2017-12-05 PROCEDURE — G8484 FLU IMMUNIZE NO ADMIN: HCPCS | Performed by: OTOLARYNGOLOGY

## 2017-12-05 PROCEDURE — 4040F PNEUMOC VAC/ADMIN/RCVD: CPT | Performed by: OTOLARYNGOLOGY

## 2017-12-05 PROCEDURE — 1123F ACP DISCUSS/DSCN MKR DOCD: CPT | Performed by: OTOLARYNGOLOGY

## 2017-12-05 PROCEDURE — G8598 ASA/ANTIPLAT THER USED: HCPCS | Performed by: OTOLARYNGOLOGY

## 2017-12-05 PROCEDURE — G8417 CALC BMI ABV UP PARAM F/U: HCPCS | Performed by: OTOLARYNGOLOGY

## 2017-12-05 PROCEDURE — 3017F COLORECTAL CA SCREEN DOC REV: CPT | Performed by: OTOLARYNGOLOGY

## 2017-12-05 PROCEDURE — G8427 DOCREV CUR MEDS BY ELIG CLIN: HCPCS | Performed by: OTOLARYNGOLOGY

## 2017-12-05 PROCEDURE — 99213 OFFICE O/P EST LOW 20 MIN: CPT | Performed by: OTOLARYNGOLOGY

## 2017-12-05 NOTE — PROGRESS NOTES
[address] [place] [address] ENT       CHIEF COMPLAINT:  Chief Complaint   Patient presents with    Hearing Loss       INTERVAL HISTORY:         Since the last visit here, hearing aids are working much better since Dr Nadira Lockhart change to larger tubes. AUDIOGRAM (11/07/2017): Bilateral symmetrical sensorineural hearing loss, mild at 250 - 2000 Hz and severe at 3000 - 8000 Hz. in a presbycusis hearing loss pattern. SRT 45 dB right and 40 dB left; word recognition score 52% right and 64% left, and 84 % binaural.  Tympanogram - type A bilaterally. Acoustic reflexes were \"NR\" on the right and elevated at 105 dB on the left. (see report for details)              COUNSELING:    Audiogram results were reviewed and discussed with Jennifer . I advised of type of hearing loss, probable etiology, possible associated impairment and disability, need for noise protection and avoidance, benefits of amplification/hearing aid, etiology of tinnitus and treatment/coping measures including masking techniques, and need for annual and prn hearing tests. Patient was advised of the difficulty understanding speech in the presence of moderate to high volume background noise associated with this hearing loss. IMPRESSION / Verdis Sample / Giorgi Peace / PROCEDURES:       Jennifer Gudino was seen today for hearing loss. Diagnoses and all orders for this visit:    Bilateral sensorineural hearing loss    Subjective tinnitus of both ears         RECOMMENDATIONS / PLAN:    1. See Patient Instructions  2. Return for any further Ear, Nose, Throat, or Sinus problems. TIME:  I spent a total of 15 minutes with this patient; counseling time = 12 minutes. More than 50% of the visit was spent counseling and/or coordination of care.

## 2018-01-03 ENCOUNTER — OFFICE VISIT (OUTPATIENT)
Dept: FAMILY MEDICINE CLINIC | Age: 78
End: 2018-01-03

## 2018-01-03 VITALS
HEIGHT: 68 IN | RESPIRATION RATE: 18 BRPM | BODY MASS INDEX: 29.67 KG/M2 | OXYGEN SATURATION: 96 % | DIASTOLIC BLOOD PRESSURE: 72 MMHG | HEART RATE: 70 BPM | SYSTOLIC BLOOD PRESSURE: 108 MMHG | WEIGHT: 195.8 LBS | TEMPERATURE: 98.1 F

## 2018-01-03 DIAGNOSIS — J02.9 ACUTE VIRAL PHARYNGITIS: ICD-10-CM

## 2018-01-03 DIAGNOSIS — J21.8 ACUTE BRONCHIOLITIS DUE TO OTHER SPECIFIED ORGANISMS: Primary | ICD-10-CM

## 2018-01-03 PROCEDURE — 99214 OFFICE O/P EST MOD 30 MIN: CPT | Performed by: NURSE PRACTITIONER

## 2018-01-03 RX ORDER — AMOXICILLIN 500 MG/1
500 CAPSULE ORAL 3 TIMES DAILY
Qty: 30 CAPSULE | Refills: 0 | Status: SHIPPED | OUTPATIENT
Start: 2018-01-03 | End: 2018-01-09 | Stop reason: CLARIF

## 2018-01-03 RX ORDER — GUAIFENESIN AND CODEINE PHOSPHATE 100; 10 MG/5ML; MG/5ML
5 SOLUTION ORAL EVERY 4 HOURS PRN
Qty: 120 ML | Refills: 0 | Status: SHIPPED | OUTPATIENT
Start: 2018-01-03 | End: 2018-01-10

## 2018-01-03 NOTE — PROGRESS NOTES
CHIEF COMPLAINT    Chief Complaint   Patient presents with    URI     PT C/O NASAL DR CLEAR, SORE THROAT, HEADACHE, PND, SL COUGH W/O PHLEGM, SOB, WHEEZING, BODY ACHES, CHILLS AND NO TEMP X2 DAYS. PT HAS TRIED ZINC, MUCUS RELIEF AND NYQUIL. HPI    Toi Muñoz is a 78 y.o. male who presents with uri  since the onset yesterday. The duration has been constant since the onset. The context is he started with cold like symptoms yesterday. Was concerned because he gets sick fast. He had same issue in august and on chart review was treated with cough meds and inhaler. Wife says says at that time he improved and they still have inhaler but no cough meds. .  The pain severity is 3/10. There are no alleviating factors. Complains of headache too. Mild sob with exterion no cp/v/d/c    REVIEW OF SYSTEMS    Pulmonary: positive difficulty breathing or hemoptysis  General: No fevers or syncope  GI: No vomiting or diarrhea  See HPI for further details. PAST MEDICAL AND SURGICAL HISTORY    Past Medical History:   Diagnosis Date    Allergic conjunctivitis of both eyes     Anemia     macrocytic    Asthmatic bronchitis     recurrent, and pneumonia    BBB (bundle branch block)     Right    Benign essential hypertension     BPH (benign prostatic hyperplasia)     Bradycardia 3/29/2015    hospital x 36 hours. Stopped Verapamil    Colon polyp 2/18/2015    q 3 yr    Colonic polyp     Coronary atheroma 2006 or 2007    mild plaques -no clinical CAD wkup  for irreg heart beat - testing neg dr. Wallace Gastelum not seen for yrs    DDD (degenerative disc disease), lumbar     L5 - S1    Diverticulosis of colon     Elevated homocysteine ([facility] [address] 12/2010    11.39     Encephalitis 1957    hospitalized 3 weeks.    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- no flare up at this time 8/65/2014   [facility] Hearing loss d/t noise     bilateral aids    Impaired fasting glucose     Leukopenia     myelodysplasia/ macrocytic anemia stable 5-6 yrs, fmd monitoring no bone marrow testdon, confirmed with lab work    Low back pain on left side with sciatica 7/26/2075    Metabolic syndrome     Mixed hypertriglyceridemia     Nausea & vomiting     Plantar fasciitis, bilateral 12/1/2012    L>R     Pneumonia     recurrent    Prostatitis 1970s    Tubulovillous adenoma     of colon    Vitamin D deficiency      Past Surgical History:   Procedure Laterality Date    CATARACT REMOVAL WITH IMPLANT  bilateral    COLONOSCOPY  2/18/2015    1 polyp, q 3 years    CYSTOSCOPY      EYE SURGERY Right 12/2016    laser to implant lens    EYE SURGERY Left 2015    laser to implant lens    LUMBAR LAMINECTOMY  8-6-2014    microlumbar laminectomy L4 L5    TURP  08/2007    for obstruction    TURP  4/15/2013     & Cystoscopy     WISDOM TOOTH EXTRACTION  1960/1970       CURRENT MEDICATIONS    Current Outpatient Rx   Medication Sig Dispense Refill    B Complex-C-Folic Acid TABS Take 1 tablet by mouth daily      guaiFENesin-codeine (CHERATUSSIN AC) 100-10 MG/5ML syrup Take 5 mLs by mouth every 4 hours as needed for Cough for up to 7 days.  120 mL 0    amoxicillin (AMOXIL) 500 MG capsule Take 1 capsule by mouth 3 times daily for 10 days 30 capsule 0    Mirabegron ER (MYRBETRIQ) 50 MG TB24 Take 50 mg by mouth every morning 90 tablet 3    donepezil (ARICEPT) 5 MG tablet Take 1 tablet by mouth nightly 30 tablet 0    simvastatin (ZOCOR) 10 MG tablet TAKE 1 TABLET BY MOUTH NIGHTLY 90 tablet 3    ZINC PO Take 1 tablet by mouth daily       BIOTIN FORTE PO Take 1 tablet by mouth daily       carbidopa-levodopa (SINEMET CR)  MG per extended release tablet Take 1 tablet by mouth 4 times daily 360 tablet 0    lisinopril (PRINIVIL;ZESTRIL) 40 MG tablet TAKE 1 TABLET EVERY DAY FOR HIGH BLOOD PRESSURE 90 tablet 1    triamcinolone (KENALOG) 0.1 %

## 2018-01-03 NOTE — PATIENT INSTRUCTIONS
restart symbicort  Drink plenty of fluids    Instructions for Respiratory Infections (SAVE THIS SHEET)     For the first 7-14 days of symptoms follow instructions below, even before being seen in the office or even during treatment with antibiotics, until symptom free.     1. Water: Drink 1 ounce of water for every 2 pounds of body weight for adults, 80 Ounces of water per day. This will loosen mucus in the head and chest & improve the weak feeling of dehydration, allow the body to get germ fighting resources to the infection. Half can be juice or sugar free Crystal Light. Don't count drinks with caffeine or carbonation. Infants can have Pedialyte liquid or freezer pops. Avoid salt if you have high Blood Pressure, swelling in the feet or ankles or have heart problems. 2. Humidity: Humidify the air to 35-50% ( or until the windows fog over slightly). Can use a humidifier, vaporizer, boil water on the stove or put a coffee can full of water on the heater vents. This will loosen mucus from infections and allergies. 3. Sleep: Get 8-10 hours a night and rest during the evening after work or school. If you have trouble sleeping, adults can take Melatonin 5mg up to 2 tabs at bedtime ( not for children or pregnant women). If Mono is suspected then sleep during 9PM to 9AM time span (if possible.)  4. Cough: Take cough medicines with Guaifenesin ( to loosen chest or head congestion) and Dextromethorphan ( to decrease excess cough). Robitussin D.M. Syrup every 4-6 hrs or Mucinex D. M. pills twice a day. Use the pediatric formulations for children over 6 months making sure they are alcohol & sugar free for children, pregnant women, and diabetics. 5. Pain And Fevers: Take Acetaminophen ( Tylenol) for fevers, aches, and headaches. 2-500 mg every 8 hours for adults. Appropriate doses at bedtime for children may help them sleep better. If pregnant take 1 -500 mg (Tylenol) every 8 hours as needed.  Ibuprofen may be used if not having problems. It's also a good idea to know your test results and keep a list of the medicines you take. How can you care for yourself at home? · Get plenty of rest if you feel tired. · Take an over-the-counter pain medicine if needed, such as acetaminophen (Tylenol), ibuprofen (Advil, Motrin), or naproxen (Aleve). Read and follow all instructions on the label. · Be careful when taking over-the-counter cold or flu medicines and Tylenol at the same time. Many of these medicines have acetaminophen, which is Tylenol. Read the labels to make sure that you are not taking more than the recommended dose. Too much acetaminophen (Tylenol) can be harmful. · Drink plenty of fluids, enough so that your urine is light yellow or clear like water. If you have kidney, heart, or liver disease and have to limit fluids, talk with your doctor before you increase the amount of fluids you drink. · Stay home from work, school, and other public places while you have a fever. When should you call for help? Call 911 anytime you think you may need emergency care. For example, call if:  ? · You have severe trouble breathing. ? · You passed out (lost consciousness). ?Call your doctor now or seek immediate medical care if:  ? · You seem to be getting much sicker. ? · You have a new or higher fever. ? · You have blood in your stools. ? · You have new belly pain, or your pain gets worse. ? · You have a new rash. ? Watch closely for changes in your health, and be sure to contact your doctor if:  ? · You start to get better and then get worse. ? · You do not get better as expected. Where can you learn more? Go to [email or web address]://[email or web address].[email or web address]. org and sign in to your [employer] account. Enter E827 in the [employer] box to learn more about \"Viral Infections: Care Instructions. \"     If you do not have an account, please click on the \"Sign Up Now\" link.   Current as of: March 3, 2017  Content Version: 11.4  © 4083-5442 [employer], [employer]. Care instructions adapted under license by [facility] ([facility]). If you have questions about a medical condition or this instruction, always ask your healthcare professional. [address] any warranty or liability for your use of this information.

## 2018-01-09 ENCOUNTER — OFFICE VISIT (OUTPATIENT)
Dept: NEUROLOGY | Age: 78
End: 2018-01-09

## 2018-01-09 VITALS
BODY MASS INDEX: 31.08 KG/M2 | SYSTOLIC BLOOD PRESSURE: 128 MMHG | DIASTOLIC BLOOD PRESSURE: 78 MMHG | WEIGHT: 198 LBS | HEART RATE: 85 BPM | HEIGHT: 67 IN

## 2018-01-09 DIAGNOSIS — R27.0 ATAXIA: Primary | ICD-10-CM

## 2018-01-09 DIAGNOSIS — G25.9 EXTRAPYRAMIDAL SYNDROME: Chronic | ICD-10-CM

## 2018-01-09 DIAGNOSIS — F03.90 DEMENTIA WITHOUT BEHAVIORAL DISTURBANCE, UNSPECIFIED DEMENTIA TYPE: ICD-10-CM

## 2018-01-09 PROCEDURE — G8417 CALC BMI ABV UP PARAM F/U: HCPCS | Performed by: PSYCHIATRY & NEUROLOGY

## 2018-01-09 PROCEDURE — 3017F COLORECTAL CA SCREEN DOC REV: CPT | Performed by: PSYCHIATRY & NEUROLOGY

## 2018-01-09 PROCEDURE — 99214 OFFICE O/P EST MOD 30 MIN: CPT | Performed by: PSYCHIATRY & NEUROLOGY

## 2018-01-09 PROCEDURE — 1036F TOBACCO NON-USER: CPT | Performed by: PSYCHIATRY & NEUROLOGY

## 2018-01-09 PROCEDURE — G8484 FLU IMMUNIZE NO ADMIN: HCPCS | Performed by: PSYCHIATRY & NEUROLOGY

## 2018-01-09 PROCEDURE — G8427 DOCREV CUR MEDS BY ELIG CLIN: HCPCS | Performed by: PSYCHIATRY & NEUROLOGY

## 2018-01-09 PROCEDURE — G8598 ASA/ANTIPLAT THER USED: HCPCS | Performed by: PSYCHIATRY & NEUROLOGY

## 2018-01-09 PROCEDURE — 1123F ACP DISCUSS/DSCN MKR DOCD: CPT | Performed by: PSYCHIATRY & NEUROLOGY

## 2018-01-09 PROCEDURE — 4040F PNEUMOC VAC/ADMIN/RCVD: CPT | Performed by: PSYCHIATRY & NEUROLOGY

## 2018-01-09 RX ORDER — CARBIDOPA AND LEVODOPA 25; 100 MG/1; MG/1
TABLET, EXTENDED RELEASE ORAL
Qty: 360 TABLET | Refills: 1 | Status: SHIPPED | OUTPATIENT
Start: 2018-01-09 | End: 2018-07-09 | Stop reason: SDUPTHER

## 2018-01-09 RX ORDER — DONEPEZIL HYDROCHLORIDE 5 MG/1
5 TABLET, FILM COATED ORAL NIGHTLY
Qty: 90 TABLET | Refills: 1 | Status: SHIPPED | OUTPATIENT
Start: 2018-01-09 | End: 2018-07-16 | Stop reason: SDUPTHER

## 2018-01-09 NOTE — PATIENT INSTRUCTIONS
Please call with any questions or concerns:   [facility] Neurology  @ [phone]. LAB RESULTS:  Please obtain any labs or diagnostic tests as discussed today. You may call the office to check the results. Please allow  3 to 7 days for us to get these results. MEDICATION LIST:  Please bring an accurate list of your medications to every visit. APPOINTMENT CONFIRMATION:  We will call you the day before your scheduled appointment to confirm. If we are unable to reach you, you MUST call back by the end of the day to confirm the appointment or we may be forced to cancel.

## 2018-01-09 NOTE — PROGRESS NOTES
Estrada Fallon   Neurology followup    Subjective:   CC/HP  History was obtained from the patient   Patient has parkinsonian syndrome. Patient feels that his motor symptoms improved after we increased his Sinemet dose  He never had any significant tremors  Patient feels that his urinary incontinence symptoms of the fall after he was put on medication by the urologist.  His memory symptoms seem to be stable and slightly improved in the last few weeks  Details of his history are as follows: Asa Zaman He has noticed loss of balance. His family has noticed a bland facial expression and his handwriting has become smaller. He has slowness of movement and has difficulty getting up off a low chair or getting out of a car. Patient has had loss of motivation and would rather sit and read or watch TV then get out and do other things. Patient's wife states that he has lost his ability to multitask. For example when he is driving as she could not talk to him to ask a question and his attention would be distracted. REVIEW OF SYSTEMS    Constitutional:  []   Chills   [x]  Fatigue   []  Fevers   []  Malaise   []  Weight loss     [] Denies all of the above    Respiratory:   []  Cough    [x]  Shortness of breath         [] Denies all of the above     Cardiovascular:   []  Chest pain    []  Exertional chest pressure/discomfort           [] Palpitations    []  Syncope     [x] Denies all of the above        Past Medical History:   Diagnosis Date    Allergic conjunctivitis of both eyes     Anemia     macrocytic    Asthmatic bronchitis     recurrent, and pneumonia    BBB (bundle branch block)     Right    Benign essential hypertension     BPH (benign prostatic hyperplasia)     Bradycardia 3/29/2015    hospital x 36 hours.  Stopped Verapamil    Colon polyp 2/18/2015    q 3 yr    Colonic polyp     Coronary atheroma 2006 or 2007    mild plaques -no clinical CAD wkup  for irreg heart beat - testing neg dr. Jeff Luna not seen for yrs    cognitive impairment, Symptoms Stable   MRI brain images reviewed  Cortical atrophy noted  There was an incidental falx meningioma in the left frontal region  B12 level was borderline      Plan :  Discussed with patient   Continue Oral-B 12 supplementation  Continue Sinemet 25/100 one tablet at 9 AM, 1 PM, 5 PM and 11 PM  Side effects were discussed. There is no treatment necessary for the incidental meningioma  Continue same dose of Aricept  Return in 6 months        Please note a portion of  this chart was generated using dragon dictation software. Although every effort was made to ensure the accuracy of this automated transcription, some errors in transcription may have occurred.

## 2018-01-16 ENCOUNTER — OFFICE VISIT (OUTPATIENT)
Dept: FAMILY MEDICINE CLINIC | Age: 78
End: 2018-01-16

## 2018-01-16 VITALS
OXYGEN SATURATION: 98 % | HEIGHT: 68 IN | WEIGHT: 196.4 LBS | DIASTOLIC BLOOD PRESSURE: 62 MMHG | HEART RATE: 78 BPM | BODY MASS INDEX: 29.77 KG/M2 | SYSTOLIC BLOOD PRESSURE: 108 MMHG | RESPIRATION RATE: 16 BRPM

## 2018-01-16 DIAGNOSIS — Z23 NEED FOR PNEUMOCOCCAL VACCINATION: ICD-10-CM

## 2018-01-16 DIAGNOSIS — I10 BENIGN ESSENTIAL HYPERTENSION: Primary | Chronic | ICD-10-CM

## 2018-01-16 DIAGNOSIS — J45.20 MILD INTERMITTENT ASTHMATIC BRONCHITIS WITHOUT COMPLICATION: ICD-10-CM

## 2018-01-16 DIAGNOSIS — R73.01 IMPAIRED FASTING GLUCOSE: Chronic | ICD-10-CM

## 2018-01-16 DIAGNOSIS — Z12.11 SCREENING FOR COLON CANCER: ICD-10-CM

## 2018-01-16 PROCEDURE — 4040F PNEUMOC VAC/ADMIN/RCVD: CPT | Performed by: FAMILY MEDICINE

## 2018-01-16 PROCEDURE — G8484 FLU IMMUNIZE NO ADMIN: HCPCS | Performed by: FAMILY MEDICINE

## 2018-01-16 PROCEDURE — 99214 OFFICE O/P EST MOD 30 MIN: CPT | Performed by: FAMILY MEDICINE

## 2018-01-16 PROCEDURE — G8417 CALC BMI ABV UP PARAM F/U: HCPCS | Performed by: FAMILY MEDICINE

## 2018-01-16 PROCEDURE — 1123F ACP DISCUSS/DSCN MKR DOCD: CPT | Performed by: FAMILY MEDICINE

## 2018-01-16 PROCEDURE — 90732 PPSV23 VACC 2 YRS+ SUBQ/IM: CPT | Performed by: FAMILY MEDICINE

## 2018-01-16 PROCEDURE — 1036F TOBACCO NON-USER: CPT | Performed by: FAMILY MEDICINE

## 2018-01-16 PROCEDURE — G8427 DOCREV CUR MEDS BY ELIG CLIN: HCPCS | Performed by: FAMILY MEDICINE

## 2018-01-16 PROCEDURE — G0009 ADMIN PNEUMOCOCCAL VACCINE: HCPCS | Performed by: FAMILY MEDICINE

## 2018-01-16 PROCEDURE — G8598 ASA/ANTIPLAT THER USED: HCPCS | Performed by: FAMILY MEDICINE

## 2018-01-16 ASSESSMENT — ENCOUNTER SYMPTOMS
SHORTNESS OF BREATH: 0
CHOKING: 0
CHEST TIGHTNESS: 0
COUGH: 1
WHEEZING: 1

## 2018-01-16 NOTE — PATIENT INSTRUCTIONS
Eliot Duggan was seen today for hypertension. Diagnoses and all orders for this visit:    Benign essential hypertension  Very variable. Record 2-3 x/week at different times. Make notes about why high or low. Water and decaf drinks. -     Continue meds and lifestyle control. Impaired fasting glucose  Continue exercise. Mild intermittent asthmatic bronchitis without complication  Better now.     Screening for colon cancer  -     Amb External Referral To Gastroenterology    Need for pneumococcal vaccination  -     Pneumococcal polysaccharide vaccine 23-valent greater than or equal to 1yo subcutaneous/IM

## 2018-01-22 RX ORDER — TRIAMCINOLONE ACETONIDE 1 MG/G
CREAM TOPICAL
Qty: 454 G | Refills: 0 | Status: SHIPPED | OUTPATIENT
Start: 2018-01-22 | End: 2018-05-13 | Stop reason: SDUPTHER

## 2018-01-29 RX ORDER — AMLODIPINE BESYLATE 5 MG/1
TABLET ORAL
Qty: 90 TABLET | Refills: 1 | Status: SHIPPED | OUTPATIENT
Start: 2018-01-29 | End: 2018-08-13 | Stop reason: SDUPTHER

## 2018-04-07 DIAGNOSIS — I10 BENIGN ESSENTIAL HYPERTENSION: Chronic | ICD-10-CM

## 2018-04-09 RX ORDER — LISINOPRIL 40 MG/1
TABLET ORAL
Qty: 90 TABLET | Refills: 0 | Status: SHIPPED | OUTPATIENT
Start: 2018-04-09 | End: 2018-07-10 | Stop reason: SDUPTHER

## 2018-04-09 RX ORDER — CLOBETASOL PROPIONATE 0.5 MG/G
CREAM TOPICAL
Qty: 15 G | Refills: 0 | Status: SHIPPED | OUTPATIENT
Start: 2018-04-09 | End: 2019-01-26 | Stop reason: SDUPTHER

## 2018-05-09 ENCOUNTER — OFFICE VISIT (OUTPATIENT)
Dept: FAMILY MEDICINE CLINIC | Age: 78
End: 2018-05-09

## 2018-05-09 VITALS
DIASTOLIC BLOOD PRESSURE: 68 MMHG | BODY MASS INDEX: 29.89 KG/M2 | HEART RATE: 72 BPM | RESPIRATION RATE: 20 BRPM | HEIGHT: 68 IN | SYSTOLIC BLOOD PRESSURE: 114 MMHG | WEIGHT: 197.2 LBS | OXYGEN SATURATION: 98 %

## 2018-05-09 DIAGNOSIS — M51.36 DDD (DEGENERATIVE DISC DISEASE), LUMBAR: Chronic | ICD-10-CM

## 2018-05-09 DIAGNOSIS — N40.0 BENIGN PROSTATIC HYPERPLASIA, UNSPECIFIED WHETHER LOWER URINARY TRACT SYMPTOMS PRESENT: Chronic | ICD-10-CM

## 2018-05-09 DIAGNOSIS — E78.2 MIXED HYPERTRIGLYCERIDEMIA: Chronic | ICD-10-CM

## 2018-05-09 DIAGNOSIS — I10 BENIGN ESSENTIAL HYPERTENSION: Primary | Chronic | ICD-10-CM

## 2018-05-09 DIAGNOSIS — R79.89 ELEVATED HOMOCYSTEINE: ICD-10-CM

## 2018-05-09 PROCEDURE — 1123F ACP DISCUSS/DSCN MKR DOCD: CPT | Performed by: FAMILY MEDICINE

## 2018-05-09 PROCEDURE — G8417 CALC BMI ABV UP PARAM F/U: HCPCS | Performed by: FAMILY MEDICINE

## 2018-05-09 PROCEDURE — 99215 OFFICE O/P EST HI 40 MIN: CPT | Performed by: FAMILY MEDICINE

## 2018-05-09 PROCEDURE — 1036F TOBACCO NON-USER: CPT | Performed by: FAMILY MEDICINE

## 2018-05-09 PROCEDURE — G8598 ASA/ANTIPLAT THER USED: HCPCS | Performed by: FAMILY MEDICINE

## 2018-05-09 PROCEDURE — 4040F PNEUMOC VAC/ADMIN/RCVD: CPT | Performed by: FAMILY MEDICINE

## 2018-05-09 PROCEDURE — G8427 DOCREV CUR MEDS BY ELIG CLIN: HCPCS | Performed by: FAMILY MEDICINE

## 2018-05-09 RX ORDER — HYDROCHLOROTHIAZIDE 12.5 MG/1
12.5 TABLET ORAL DAILY
COMMUNITY
End: 2018-10-10 | Stop reason: DRUGHIGH

## 2018-05-14 RX ORDER — TRIAMCINOLONE ACETONIDE 1 MG/G
CREAM TOPICAL
Qty: 454 G | Refills: 0 | Status: SHIPPED | OUTPATIENT
Start: 2018-05-14 | End: 2018-09-04 | Stop reason: SDUPTHER

## 2018-07-09 ENCOUNTER — OFFICE VISIT (OUTPATIENT)
Dept: NEUROLOGY | Age: 78
End: 2018-07-09

## 2018-07-09 VITALS
HEART RATE: 84 BPM | WEIGHT: 196 LBS | SYSTOLIC BLOOD PRESSURE: 116 MMHG | BODY MASS INDEX: 30.76 KG/M2 | HEIGHT: 67 IN | DIASTOLIC BLOOD PRESSURE: 81 MMHG

## 2018-07-09 DIAGNOSIS — R25.8 BRADYKINESIA: ICD-10-CM

## 2018-07-09 DIAGNOSIS — G25.9 EXTRAPYRAMIDAL SYNDROME: Primary | Chronic | ICD-10-CM

## 2018-07-09 DIAGNOSIS — G31.84 MILD COGNITIVE IMPAIRMENT: Chronic | ICD-10-CM

## 2018-07-09 DIAGNOSIS — R27.0 ATAXIA: ICD-10-CM

## 2018-07-09 DIAGNOSIS — D32.9 MENINGIOMA (HCC): Chronic | ICD-10-CM

## 2018-07-09 PROCEDURE — 99214 OFFICE O/P EST MOD 30 MIN: CPT | Performed by: PSYCHIATRY & NEUROLOGY

## 2018-07-09 PROCEDURE — 1123F ACP DISCUSS/DSCN MKR DOCD: CPT | Performed by: PSYCHIATRY & NEUROLOGY

## 2018-07-09 PROCEDURE — G8417 CALC BMI ABV UP PARAM F/U: HCPCS | Performed by: PSYCHIATRY & NEUROLOGY

## 2018-07-09 PROCEDURE — 4040F PNEUMOC VAC/ADMIN/RCVD: CPT | Performed by: PSYCHIATRY & NEUROLOGY

## 2018-07-09 PROCEDURE — G8598 ASA/ANTIPLAT THER USED: HCPCS | Performed by: PSYCHIATRY & NEUROLOGY

## 2018-07-09 PROCEDURE — 1036F TOBACCO NON-USER: CPT | Performed by: PSYCHIATRY & NEUROLOGY

## 2018-07-09 PROCEDURE — G8427 DOCREV CUR MEDS BY ELIG CLIN: HCPCS | Performed by: PSYCHIATRY & NEUROLOGY

## 2018-07-09 RX ORDER — CARBIDOPA AND LEVODOPA 25; 100 MG/1; MG/1
TABLET, EXTENDED RELEASE ORAL
Qty: 540 TABLET | Refills: 0 | Status: SHIPPED | OUTPATIENT
Start: 2018-07-09 | End: 2018-12-10 | Stop reason: SDUPTHER

## 2018-07-09 NOTE — PROGRESS NOTES
Dave Wilkins   Neurology followup    Subjective:   CC/HP  History was obtained from the patient   Patient has parkinsonian syndrome. Patient feels that his voice is much worse and it gets very soft at times. Pupils have trouble understanding him during those times. Patient feels that his gait is somewhat stiff. He also has poor balance. She has occasional mild tremors. Urinary symptoms have improved. His memory symptoms seem to be stable and slightly improved in the last few weeks  Details of [facility] as follows[facility] He has noticed loss of balance. His family has noticed a bland facial expression and his handwriting has become smaller. He has slowness of movement and has difficulty getting up off a low chair or getting out of a car. Patient has had loss of motivation and would rather sit and read or watch TV then get out and do other things. Patient's wife states that he has lost his ability to multitask. For example when he is driving as she could not talk to him to ask a question and his attention would be distracted. REVIEW OF SYSTEMS    Constitutional:  []   Chills   [x]  Fatigue   []  Fevers   []  Malaise   []  Weight loss     [] Denies all of the above    Respiratory:   []  Cough    [x]  Shortness of breath         [] Denies all of the above     Cardiovascular:   []  Chest pain    []  Exertional chest pressure/discomfort           [] Palpitations    []  Syncope     [x] Denies all of the above        Past Medical History:   Diagnosis Date    Allergic conjunctivitis of both eyes     Anemia     macrocytic    Asthmatic bronchitis     recurrent, and pneumonia    BBB (bundle branch block)     Right    Benign essential hypertension     BPH (benign prostatic hyperplasia)     Bradycardia 3/29/2015    hospital x 36 hours.  Stopped Verapamil    Colon polyp 2/18/2015    q 3 yr    Colon polyp 03/15/2018    COLONOSCOPY, DR ACUÑA [address] [facility], 3 MM ASCENDING COLON POLYP REMOVED BY COLD Lab Results   Component Value Date     07/17/2017    K 3.9 07/17/2017     07/17/2017    CO2 28 07/17/2017    BUN 16 07/17/2017    LABALBU 4.0 07/17/2017    CREATININE 1.0 07/17/2017    CALCIUM 9.3 07/17/2017    GFRAA >60 07/17/2017    GFRAA 55 04/16/2013    LABGLOM >60 07/17/2017    GLUCOSE 118 07/17/2017    GLUCOSE 107 08/01/2011   TSH was normal  B12 was at the lower end of normal at 338  RADIOLOGY REVIEW:  I have reviewed radiology image(s) and reports(s) of:  MRI brain    Impression :  Extrapyramidal syndrome/parkinsonian syndrome, Multiple symptoms as detailed above  Mild cognitive impairment, Symptoms Stable   MRI brain images reviewed  Cortical atrophy noted  There was an incidental falx meningioma in the left frontal region  B12 level was borderline      Plan :  Discussed with patient   Continue Oral-B 12 supplementation  Increase Sinemet 25/100 to 1-1/2 tablets at 9 AM, 1 PM, 5 PM and 11 PM  Side effects were discussed. There is no treatment necessary for the incidental meningioma  Continue same dose of Aricept  Return in 3 months        Please note a portion of  this chart was generated using dragon dictation software. Although every effort was made to ensure the accuracy of this automated transcription, some errors in transcription may have occurred.

## 2018-07-10 ENCOUNTER — TELEPHONE (OUTPATIENT)
Dept: NEUROLOGY | Age: 78
End: 2018-07-10

## 2018-07-10 DIAGNOSIS — E78.2 MIXED HYPERTRIGLYCERIDEMIA: Chronic | ICD-10-CM

## 2018-07-10 DIAGNOSIS — I10 BENIGN ESSENTIAL HYPERTENSION: Chronic | ICD-10-CM

## 2018-07-10 NOTE — TELEPHONE ENCOUNTER
[address] [place] [address] Dr called to request that the regular sinemet be called in since it is not recommended to break sinemet cr. Per dr Cherrie zepeda.

## 2018-07-11 RX ORDER — LISINOPRIL 40 MG/1
TABLET ORAL
Qty: 90 TABLET | Refills: 0 | Status: SHIPPED | OUTPATIENT
Start: 2018-07-11 | End: 2018-10-09 | Stop reason: SDUPTHER

## 2018-07-11 RX ORDER — SIMVASTATIN 10 MG
TABLET ORAL
Qty: 90 TABLET | Refills: 3 | Status: SHIPPED | OUTPATIENT
Start: 2018-07-11 | End: 2019-07-31 | Stop reason: SDUPTHER

## 2018-07-16 DIAGNOSIS — F03.90 DEMENTIA WITHOUT BEHAVIORAL DISTURBANCE, UNSPECIFIED DEMENTIA TYPE: ICD-10-CM

## 2018-07-16 RX ORDER — DONEPEZIL HYDROCHLORIDE 5 MG/1
5 TABLET, FILM COATED ORAL NIGHTLY
Qty: 90 TABLET | Refills: 0 | Status: SHIPPED | OUTPATIENT
Start: 2018-07-16 | End: 2018-10-09 | Stop reason: SDUPTHER

## 2018-08-14 RX ORDER — AMLODIPINE BESYLATE 5 MG/1
TABLET ORAL
Qty: 90 TABLET | Refills: 0 | Status: SHIPPED | OUTPATIENT
Start: 2018-08-14 | End: 2018-08-20 | Stop reason: SDUPTHER

## 2018-08-21 RX ORDER — AMLODIPINE BESYLATE 5 MG/1
TABLET ORAL
Qty: 90 TABLET | Refills: 0 | Status: SHIPPED | OUTPATIENT
Start: 2018-08-21 | End: 2018-11-08 | Stop reason: DRUGHIGH

## 2018-09-04 RX ORDER — TRIAMCINOLONE ACETONIDE 1 MG/G
CREAM TOPICAL
Qty: 454 G | Refills: 0 | Status: SHIPPED | OUTPATIENT
Start: 2018-09-04 | End: 2018-12-06 | Stop reason: SDUPTHER

## 2018-10-09 ENCOUNTER — OFFICE VISIT (OUTPATIENT)
Dept: NEUROLOGY | Age: 78
End: 2018-10-09
Payer: MEDICARE

## 2018-10-09 VITALS
HEIGHT: 67 IN | WEIGHT: 197 LBS | DIASTOLIC BLOOD PRESSURE: 82 MMHG | SYSTOLIC BLOOD PRESSURE: 121 MMHG | HEART RATE: 80 BPM | BODY MASS INDEX: 30.92 KG/M2

## 2018-10-09 DIAGNOSIS — G25.9 EXTRAPYRAMIDAL SYNDROME: Primary | Chronic | ICD-10-CM

## 2018-10-09 DIAGNOSIS — F03.90 DEMENTIA WITHOUT BEHAVIORAL DISTURBANCE, UNSPECIFIED DEMENTIA TYPE: ICD-10-CM

## 2018-10-09 DIAGNOSIS — R49.0 DYSPHONIA: ICD-10-CM

## 2018-10-09 DIAGNOSIS — R27.0 ATAXIA: ICD-10-CM

## 2018-10-09 DIAGNOSIS — I10 BENIGN ESSENTIAL HYPERTENSION: Chronic | ICD-10-CM

## 2018-10-09 DIAGNOSIS — H91.93 BILATERAL HEARING LOSS, UNSPECIFIED HEARING LOSS TYPE: ICD-10-CM

## 2018-10-09 PROCEDURE — G8598 ASA/ANTIPLAT THER USED: HCPCS | Performed by: PSYCHIATRY & NEUROLOGY

## 2018-10-09 PROCEDURE — 99214 OFFICE O/P EST MOD 30 MIN: CPT | Performed by: PSYCHIATRY & NEUROLOGY

## 2018-10-09 PROCEDURE — 1036F TOBACCO NON-USER: CPT | Performed by: PSYCHIATRY & NEUROLOGY

## 2018-10-09 PROCEDURE — G8484 FLU IMMUNIZE NO ADMIN: HCPCS | Performed by: PSYCHIATRY & NEUROLOGY

## 2018-10-09 PROCEDURE — 1123F ACP DISCUSS/DSCN MKR DOCD: CPT | Performed by: PSYCHIATRY & NEUROLOGY

## 2018-10-09 PROCEDURE — 4040F PNEUMOC VAC/ADMIN/RCVD: CPT | Performed by: PSYCHIATRY & NEUROLOGY

## 2018-10-09 PROCEDURE — 1101F PT FALLS ASSESS-DOCD LE1/YR: CPT | Performed by: PSYCHIATRY & NEUROLOGY

## 2018-10-09 PROCEDURE — G8417 CALC BMI ABV UP PARAM F/U: HCPCS | Performed by: PSYCHIATRY & NEUROLOGY

## 2018-10-09 PROCEDURE — G8427 DOCREV CUR MEDS BY ELIG CLIN: HCPCS | Performed by: PSYCHIATRY & NEUROLOGY

## 2018-10-09 RX ORDER — DONEPEZIL HYDROCHLORIDE 5 MG/1
5 TABLET, FILM COATED ORAL NIGHTLY
Qty: 90 TABLET | Refills: 0 | Status: SHIPPED | OUTPATIENT
Start: 2018-10-09 | End: 2018-12-10 | Stop reason: SDUPTHER

## 2018-10-09 NOTE — PATIENT INSTRUCTIONS
Please call with any questions or concerns:   [facility] Neurology  @ [phone]. LAB RESULTS:  Please obtain any labs or diagnostic tests as discussed today. You should call the office to check the results. Please allow  3 to 7 days for us to get these results. MEDICATION LIST:  Please bring an accurate list of your medications to every visit. APPOINTMENT CONFIRMATION:  We will call you the day before your scheduled appointment to confirm. If we are unable to reach you, you MUST call back by the end of the day to confirm the appointment or we may be forced to cancel.

## 2018-10-10 RX ORDER — LISINOPRIL 40 MG/1
TABLET ORAL
Qty: 90 TABLET | Refills: 0 | Status: SHIPPED | OUTPATIENT
Start: 2018-10-10 | End: 2019-01-15 | Stop reason: SDUPTHER

## 2018-10-10 RX ORDER — HYDROCHLOROTHIAZIDE 25 MG/1
TABLET ORAL
Qty: 90 TABLET | Refills: 1 | Status: SHIPPED | OUTPATIENT
Start: 2018-10-10 | End: 2019-03-26 | Stop reason: DRUGHIGH

## 2018-10-10 NOTE — TELEPHONE ENCOUNTER
Call patient's spouse to schedule his fasting labs approximately one week before their visit is due in the office on 11/9/18. Thanks.

## 2018-10-16 ENCOUNTER — TELEPHONE (OUTPATIENT)
Dept: FAMILY MEDICINE CLINIC | Age: 78
End: 2018-10-16

## 2018-10-16 NOTE — TELEPHONE ENCOUNTER
Patient needs a referral to see Dr Chirag Liz- Dermatology  This is required through insurance  Referral just needs to be renewed  Patent is in that office now

## 2018-11-06 ENCOUNTER — NURSE ONLY (OUTPATIENT)
Dept: FAMILY MEDICINE CLINIC | Age: 78
End: 2018-11-06
Payer: MEDICARE

## 2018-11-06 DIAGNOSIS — I10 BENIGN ESSENTIAL HYPERTENSION: Chronic | ICD-10-CM

## 2018-11-06 DIAGNOSIS — M51.36 DDD (DEGENERATIVE DISC DISEASE), LUMBAR: Chronic | ICD-10-CM

## 2018-11-06 DIAGNOSIS — R73.01 IMPAIRED FASTING GLUCOSE: Chronic | ICD-10-CM

## 2018-11-06 DIAGNOSIS — D64.89 ANEMIA DUE TO OTHER CAUSE, NOT CLASSIFIED: Chronic | ICD-10-CM

## 2018-11-06 DIAGNOSIS — D70.9 NEUTROPENIA, UNSPECIFIED TYPE (HCC): Chronic | ICD-10-CM

## 2018-11-06 DIAGNOSIS — E78.2 MIXED HYPERTRIGLYCERIDEMIA: Chronic | ICD-10-CM

## 2018-11-06 LAB
A/G RATIO: 1.9 (ref 1.1–2.2)
ALBUMIN SERPL-MCNC: 4.2 G/DL (ref 3.4–5)
ALP BLD-CCNC: 69 U/L (ref 40–129)
ALT SERPL-CCNC: 31 U/L (ref 10–40)
ANION GAP SERPL CALCULATED.3IONS-SCNC: 14 MMOL/L (ref 3–16)
AST SERPL-CCNC: 22 U/L (ref 15–37)
BILIRUB SERPL-MCNC: 0.6 MG/DL (ref 0–1)
BUN BLDV-MCNC: 16 MG/DL (ref 7–20)
CALCIUM SERPL-MCNC: 9.2 MG/DL (ref 8.3–10.6)
CHLORIDE BLD-SCNC: 104 MMOL/L (ref 99–110)
CHOLESTEROL, TOTAL: 170 MG/DL (ref 0–199)
CO2: 28 MMOL/L (ref 21–32)
CREAT SERPL-MCNC: 1 MG/DL (ref 0.8–1.3)
GFR AFRICAN AMERICAN: >60
GFR NON-AFRICAN AMERICAN: >60
GLOBULIN: 2.2 G/DL
GLUCOSE BLD-MCNC: 113 MG/DL (ref 70–99)
HCT VFR BLD CALC: 43.9 % (ref 40.5–52.5)
HDLC SERPL-MCNC: 70 MG/DL (ref 40–60)
HEMOGLOBIN: 14.7 G/DL (ref 13.5–17.5)
LDL CHOLESTEROL CALCULATED: 77 MG/DL
MCH RBC QN AUTO: 35.2 PG (ref 26–34)
MCHC RBC AUTO-ENTMCNC: 33.4 G/DL (ref 31–36)
MCV RBC AUTO: 105.4 FL (ref 80–100)
PDW BLD-RTO: 13.3 % (ref 12.4–15.4)
PLATELET # BLD: 207 K/UL (ref 135–450)
PMV BLD AUTO: 8.4 FL (ref 5–10.5)
POTASSIUM SERPL-SCNC: 4.3 MMOL/L (ref 3.5–5.1)
RBC # BLD: 4.17 M/UL (ref 4.2–5.9)
SODIUM BLD-SCNC: 146 MMOL/L (ref 136–145)
TOTAL PROTEIN: 6.4 G/DL (ref 6.4–8.2)
TRIGL SERPL-MCNC: 116 MG/DL (ref 0–150)
VLDLC SERPL CALC-MCNC: 23 MG/DL
WBC # BLD: 4.8 K/UL (ref 4–11)

## 2018-11-06 PROCEDURE — 36415 COLL VENOUS BLD VENIPUNCTURE: CPT | Performed by: FAMILY MEDICINE

## 2018-11-07 LAB
ESTIMATED AVERAGE GLUCOSE: 111.2 MG/DL
HBA1C MFR BLD: 5.5 %

## 2018-11-08 ENCOUNTER — OFFICE VISIT (OUTPATIENT)
Dept: FAMILY MEDICINE CLINIC | Age: 78
End: 2018-11-08
Payer: MEDICARE

## 2018-11-08 VITALS
RESPIRATION RATE: 16 BRPM | OXYGEN SATURATION: 98 % | BODY MASS INDEX: 30.83 KG/M2 | TEMPERATURE: 98.2 F | WEIGHT: 196.4 LBS | HEART RATE: 62 BPM | DIASTOLIC BLOOD PRESSURE: 72 MMHG | HEIGHT: 67 IN | SYSTOLIC BLOOD PRESSURE: 102 MMHG

## 2018-11-08 DIAGNOSIS — E78.2 MIXED HYPERTRIGLYCERIDEMIA: Chronic | ICD-10-CM

## 2018-11-08 DIAGNOSIS — I10 BENIGN ESSENTIAL HYPERTENSION: Primary | Chronic | ICD-10-CM

## 2018-11-08 DIAGNOSIS — R73.01 IMPAIRED FASTING GLUCOSE: Chronic | ICD-10-CM

## 2018-11-08 DIAGNOSIS — D64.89 ANEMIA DUE TO OTHER CAUSE, NOT CLASSIFIED: Chronic | ICD-10-CM

## 2018-11-08 PROCEDURE — 1101F PT FALLS ASSESS-DOCD LE1/YR: CPT | Performed by: FAMILY MEDICINE

## 2018-11-08 PROCEDURE — 4040F PNEUMOC VAC/ADMIN/RCVD: CPT | Performed by: FAMILY MEDICINE

## 2018-11-08 PROCEDURE — 1036F TOBACCO NON-USER: CPT | Performed by: FAMILY MEDICINE

## 2018-11-08 PROCEDURE — G8427 DOCREV CUR MEDS BY ELIG CLIN: HCPCS | Performed by: FAMILY MEDICINE

## 2018-11-08 PROCEDURE — G8417 CALC BMI ABV UP PARAM F/U: HCPCS | Performed by: FAMILY MEDICINE

## 2018-11-08 PROCEDURE — G8484 FLU IMMUNIZE NO ADMIN: HCPCS | Performed by: FAMILY MEDICINE

## 2018-11-08 PROCEDURE — 1123F ACP DISCUSS/DSCN MKR DOCD: CPT | Performed by: FAMILY MEDICINE

## 2018-11-08 PROCEDURE — G8598 ASA/ANTIPLAT THER USED: HCPCS | Performed by: FAMILY MEDICINE

## 2018-11-08 PROCEDURE — 99214 OFFICE O/P EST MOD 30 MIN: CPT | Performed by: FAMILY MEDICINE

## 2018-11-08 ASSESSMENT — PATIENT HEALTH QUESTIONNAIRE - PHQ9
1. LITTLE INTEREST OR PLEASURE IN DOING THINGS: 0
SUM OF ALL RESPONSES TO PHQ QUESTIONS 1-9: 0
SUM OF ALL RESPONSES TO PHQ9 QUESTIONS 1 & 2: 0
2. FEELING DOWN, DEPRESSED OR HOPELESS: 0
SUM OF ALL RESPONSES TO PHQ QUESTIONS 1-9: 0

## 2018-11-08 ASSESSMENT — ENCOUNTER SYMPTOMS
SHORTNESS OF BREATH: 1
CHEST TIGHTNESS: 0
CHOKING: 0
WHEEZING: 1
COUGH: 0

## 2018-11-08 NOTE — PROGRESS NOTES
Subjective:      Patient ID: Sima Moreno is a 70 y.o. male. Chief Complaint   Patient presents with    Results     F/U LAB RESULTS     HPI    Benign essential hypertension  116-120/88. Has not checked their cuff against ours. Is still on Amlodipine after hospital stay with hypertension. Mixed hypertriglyceridemia  All on goal.  Has diet in moderate control. Has cramps in legs. Thought it was Parkinson's or low potassium. Impaired fasting glucose  Does not meet criteria. Weight unchanged. Anemia due to other cause, not classified  Not seeing   Parkinson's  They were at Sinemet 1.5 tabs qid now 1 tab qid as no difference noted In symptoms with the higher dose. Tamea Dress of the steps. Current Outpatient Prescriptions   Medication Sig Dispense Refill    hydrochlorothiazide (HYDRODIURIL) 25 MG tablet TAKE 1 TABLET EVERY DAY (Patient taking differently: one half tablet daily) 90 tablet 1    lisinopril (PRINIVIL;ZESTRIL) 40 MG tablet TAKE 1 TABLET EVERY DAY FOR HIGH BLOOD PRESSURE 90 tablet 0    donepezil (ARICEPT) 5 MG tablet Take 1 tablet by mouth nightly 90 tablet 0    triamcinolone (KENALOG) 0.1 % cream APPLY TOPICALLY TO THE AFFECTED AREA(S) TWICE DAILY AS DIRECTED 454 g 0    amLODIPine (NORVASC) 5 MG tablet TAKE 1 TABLET EVERY DAY 90 tablet 0    simvastatin (ZOCOR) 10 MG tablet TAKE 1 TABLET EVERY NIGHT 90 tablet 3    carbidopa-levodopa (SINEMET CR)  MG per extended release tablet Take 1-1/2 tablets by mouth 4 times daily 540 tablet 0    clobetasol (TEMOVATE) 0.05 % cream APPLY TOPICALLY TWICE DAILY AS DIRECTED 15 g 0    B Complex-C-Folic Acid TABS Take 1 tablet by mouth daily      Mirabegron ER (MYRBETRIQ) 50 MG TB24 Take 50 mg by mouth every morning 90 tablet 3    BIOTIN FORTE PO Take 1 tablet by mouth daily       budesonide-formoterol (SYMBICORT) 160-4.5 MCG/ACT AERO Inhale 2 puffs into the lungs 2 times daily Use spacer. Rinse and spit.  1 Inhaler 1    Cyanocobalamin (VITAMIN B-12 present. He has no cervical adenopathy. Right cervical: No superficial cervical, no deep cervical and no posterior cervical adenopathy present. Left cervical: No superficial cervical, no deep cervical and no posterior cervical adenopathy present. Neurological: He is alert. Gait (slow smaller steps.) abnormal.   Reflex Scores:       Patellar reflexes are 2+ on the right side and 2+ on the left side. Slight sway with Romberg but stays upright. Skin: Skin is warm and dry. He is not diaphoretic. No pallor. Psychiatric: He has a normal mood and affect. His speech is normal and behavior is normal. Judgment normal.   Nursing note and vitals reviewed. Vitals:    11/08/18 1326   BP: 102/72   Site: Right Upper Arm   Position: Sitting   Cuff Size: Medium Adult   Pulse: 62   Resp: 16   Temp: 98.2 °F (36.8 °C)   TempSrc: Oral   SpO2: 98%   Weight: 196 lb 6.4 oz (89.1 kg)  Comment: with shoes   Height: 5' 7\" (1.702 m)  Comment: with shoes     BP Readings from Last 3 Encounters:   11/08/18 102/72   10/09/18 121/82   07/09/18 116/81     Pulse Readings from Last 3 Encounters:   11/08/18 62   10/09/18 80   07/09/18 84     Wt Readings from Last 3 Encounters:   11/08/18 196 lb 6.4 oz (89.1 kg)   10/09/18 197 lb (89.4 kg)   07/09/18 196 lb (88.9 kg)     Body mass index is 30.76 kg/m².      11/6/2018 07:52   Sodium 146 (H)   Potassium 4.3   Chloride 104   CO2 28   BUN 16   Creatinine 1.0   Anion Gap 14   GFR Non-African American >60   Glucose 113 (H)   Calcium 9.2   Total Protein 6.4   Cholesterol, Total 170   HDL Cholesterol 70 (H)   LDL Calculated 77   Triglycerides 116   VLDL Cholesterol Calculated 23   Albumin 4.2   Globulin 2.2   Albumin/Globulin Ratio 1.9   Alk Phos 69   ALT 31   AST 22   Bilirubin 0.6   Hemoglobin A1C 5.5   eAG (mg/dL) 111.2   WBC 4.8   RBC 4.17 (L)   Hemoglobin Quant 14.7   Hematocrit 43.9   .4 (H)   MCH 35.2 (H)   MCHC 33.4   MPV 8.4   RDW 13.3   Platelet Count 244 Assessment:      1. Benign essential hypertension    2. Mixed hypertriglyceridemia    3. Impaired fasting glucose    4. Anemia due to other cause, not classified          Plan:        Almaz Marroquin was seen today for results. Diagnoses and all orders for this visit:    Benign essential hypertension  STOP Amlodipine 5 mg now. Can save for 1 year in casePressures increase. .  For patients in their late 66's and above in age the systolic blood pressure (top number) goal is 120-140 with occasional increases to 150's not concerning. Call if less 100 as low blood pressures increase the risk of falls and injuries. Call also if more than 180 once or staying above 160 more than 1/3 of the time. If systolic blood pressure is staying 100-120 we may need to decrease the blood pressure medicine. Explained that it increases risk of falls to have blood pressures below 120 in elderly patients. Look for hidden salt. BRING IN CUFF TO VISIT OR A NURSE VISIT. Mixed hypertriglyceridemia  -     Good control.  -     Continue meds and lifestyle control. Increase vitamin D to 2000 IU daily. Impaired fasting glucose  Weight stable. Not meeting criteria for this diagnosis now. Anemia due to other cause, not classified  Stable. Falls  Home exercise plan. Muscle conditioning exercises. The more active you stay the more independent you stay.       Nicole Reyna, DO

## 2018-12-06 RX ORDER — TRIAMCINOLONE ACETONIDE 1 MG/G
CREAM TOPICAL
Qty: 454 G | Refills: 0 | Status: SHIPPED | OUTPATIENT
Start: 2018-12-06 | End: 2019-03-17 | Stop reason: SDUPTHER

## 2018-12-10 ENCOUNTER — OFFICE VISIT (OUTPATIENT)
Dept: NEUROLOGY | Age: 78
End: 2018-12-10
Payer: MEDICARE

## 2018-12-10 ENCOUNTER — TELEPHONE (OUTPATIENT)
Dept: FAMILY MEDICINE CLINIC | Age: 78
End: 2018-12-10

## 2018-12-10 VITALS
BODY MASS INDEX: 30.92 KG/M2 | WEIGHT: 197 LBS | DIASTOLIC BLOOD PRESSURE: 85 MMHG | SYSTOLIC BLOOD PRESSURE: 133 MMHG | HEART RATE: 79 BPM | HEIGHT: 67 IN

## 2018-12-10 DIAGNOSIS — N32.81 OAB (OVERACTIVE BLADDER): Primary | ICD-10-CM

## 2018-12-10 DIAGNOSIS — R27.0 ATAXIA: ICD-10-CM

## 2018-12-10 DIAGNOSIS — F03.90 DEMENTIA WITHOUT BEHAVIORAL DISTURBANCE, UNSPECIFIED DEMENTIA TYPE: ICD-10-CM

## 2018-12-10 DIAGNOSIS — G25.9 EXTRAPYRAMIDAL SYNDROME: Primary | Chronic | ICD-10-CM

## 2018-12-10 PROCEDURE — 1036F TOBACCO NON-USER: CPT | Performed by: PSYCHIATRY & NEUROLOGY

## 2018-12-10 PROCEDURE — G8484 FLU IMMUNIZE NO ADMIN: HCPCS | Performed by: PSYCHIATRY & NEUROLOGY

## 2018-12-10 PROCEDURE — G8598 ASA/ANTIPLAT THER USED: HCPCS | Performed by: PSYCHIATRY & NEUROLOGY

## 2018-12-10 PROCEDURE — 99214 OFFICE O/P EST MOD 30 MIN: CPT | Performed by: PSYCHIATRY & NEUROLOGY

## 2018-12-10 PROCEDURE — G8417 CALC BMI ABV UP PARAM F/U: HCPCS | Performed by: PSYCHIATRY & NEUROLOGY

## 2018-12-10 PROCEDURE — 1101F PT FALLS ASSESS-DOCD LE1/YR: CPT | Performed by: PSYCHIATRY & NEUROLOGY

## 2018-12-10 PROCEDURE — G8427 DOCREV CUR MEDS BY ELIG CLIN: HCPCS | Performed by: PSYCHIATRY & NEUROLOGY

## 2018-12-10 PROCEDURE — 4040F PNEUMOC VAC/ADMIN/RCVD: CPT | Performed by: PSYCHIATRY & NEUROLOGY

## 2018-12-10 PROCEDURE — 1123F ACP DISCUSS/DSCN MKR DOCD: CPT | Performed by: PSYCHIATRY & NEUROLOGY

## 2018-12-10 RX ORDER — CARBIDOPA AND LEVODOPA 25; 100 MG/1; MG/1
TABLET, EXTENDED RELEASE ORAL
Qty: 480 TABLET | Refills: 0 | Status: SHIPPED | OUTPATIENT
Start: 2018-12-10 | End: 2019-03-05 | Stop reason: DRUGHIGH

## 2018-12-10 RX ORDER — DONEPEZIL HYDROCHLORIDE 10 MG/1
10 TABLET, FILM COATED ORAL NIGHTLY
Qty: 90 TABLET | Refills: 0 | Status: SHIPPED | OUTPATIENT
Start: 2018-12-10 | End: 2019-03-05 | Stop reason: SDUPTHER

## 2018-12-10 NOTE — PROGRESS NOTES
Myrna Dela Cruz   Neurology followup    Subjective:   CC/HP  History was obtained from the patient   Patient has parkinsonian syndrome. His motor symptoms seem to be reasonably stable on the current dose of Sinemet. He still has trouble getting in and out of a car and to get up from a low chair. He  He also has poor balance. He has occasional mild tremors. Urinary symptoms have improved. Patient has worsening of his cognitive symptoms. Details of his history are as follows: Mignon Haywood He has noticed loss of balance. His family has noticed a bland facial expression and his handwriting has become smaller. He has slowness of movement and has difficulty getting up off a low chair or getting out of a car. Patient has had loss of motivation and would rather sit and read or watch TV then get out and do other things. Patient's wife states that he has lost his ability to multitask. For example when he is driving as she could not talk to him to ask a question and his attention would be distracted. REVIEW OF SYSTEMS    Constitutional:  []   Chills   [x]  Fatigue   []  Fevers   []  Malaise   []  Weight loss     [] Denies all of the above    Respiratory:   []  Cough    [x]  Shortness of breath         [] Denies all of the above     Cardiovascular:   []  Chest pain    []  Exertional chest pressure/discomfort           [] Palpitations    []  Syncope     [x] Denies all of the above        Past Medical History:   Diagnosis Date    Allergic conjunctivitis of both eyes     Anemia     macrocytic    Asthmatic bronchitis     recurrent, and pneumonia    BBB (bundle branch block)     Right    Benign essential hypertension     BPH (benign prostatic hyperplasia)     Bradycardia 3/29/2015    hospital x 36 hours.  Stopped Verapamil    Colon polyp 2/18/2015    q 3 yr    Colon polyp 03/15/2018    COLONOSCOPY, DR ARIANNE ESQUIVEL, 3 MM ASCENDING COLON POLYP REMOVED BY COLD BIOPSY FORCEPS, 6 MM TRANSVERSE COLON POLYP REMOVED BY COLD

## 2018-12-11 RX ORDER — OXYBUTYNIN CHLORIDE 10 MG/1
10 TABLET, EXTENDED RELEASE ORAL DAILY
Qty: 30 TABLET | Refills: 0 | Status: SHIPPED | OUTPATIENT
Start: 2018-12-11 | End: 2019-04-01 | Stop reason: ALTCHOICE

## 2019-01-01 DIAGNOSIS — N39.41 URGE INCONTINENCE OF URINE: ICD-10-CM

## 2019-01-02 RX ORDER — MIRABEGRON 50 MG/1
TABLET, FILM COATED, EXTENDED RELEASE ORAL
Qty: 90 TABLET | Refills: 3 | Status: SHIPPED | OUTPATIENT
Start: 2019-01-02 | End: 2019-11-20 | Stop reason: SDUPTHER

## 2019-01-02 RX ORDER — AMLODIPINE BESYLATE 5 MG/1
TABLET ORAL
Qty: 90 TABLET | Refills: 1 | Status: SHIPPED | OUTPATIENT
Start: 2019-01-02 | End: 2019-04-01 | Stop reason: ALTCHOICE

## 2019-01-15 DIAGNOSIS — I10 BENIGN ESSENTIAL HYPERTENSION: Chronic | ICD-10-CM

## 2019-01-15 RX ORDER — LISINOPRIL 40 MG/1
TABLET ORAL
Qty: 90 TABLET | Refills: 0 | Status: SHIPPED | OUTPATIENT
Start: 2019-01-15 | End: 2019-03-14 | Stop reason: SDUPTHER

## 2019-01-28 RX ORDER — CLOBETASOL PROPIONATE 0.5 MG/G
CREAM TOPICAL
Qty: 15 G | Refills: 2 | Status: SHIPPED | OUTPATIENT
Start: 2019-01-28 | End: 2021-12-28

## 2019-02-08 ENCOUNTER — TELEPHONE (OUTPATIENT)
Dept: FAMILY MEDICINE CLINIC | Age: 79
End: 2019-02-08

## 2019-03-05 ENCOUNTER — OFFICE VISIT (OUTPATIENT)
Dept: NEUROLOGY | Age: 79
End: 2019-03-05
Payer: MEDICARE

## 2019-03-05 VITALS
HEART RATE: 82 BPM | HEIGHT: 67 IN | BODY MASS INDEX: 30.45 KG/M2 | SYSTOLIC BLOOD PRESSURE: 122 MMHG | WEIGHT: 194 LBS | DIASTOLIC BLOOD PRESSURE: 85 MMHG

## 2019-03-05 DIAGNOSIS — G31.84 MILD COGNITIVE IMPAIRMENT: ICD-10-CM

## 2019-03-05 DIAGNOSIS — D32.9 MENINGIOMA (HCC): ICD-10-CM

## 2019-03-05 DIAGNOSIS — R27.0 ATAXIA: ICD-10-CM

## 2019-03-05 DIAGNOSIS — F03.90 DEMENTIA WITHOUT BEHAVIORAL DISTURBANCE, UNSPECIFIED DEMENTIA TYPE: ICD-10-CM

## 2019-03-05 DIAGNOSIS — G25.9 EXTRAPYRAMIDAL SYNDROME: Primary | ICD-10-CM

## 2019-03-05 DIAGNOSIS — R25.8 BRADYKINESIA: ICD-10-CM

## 2019-03-05 PROCEDURE — G8484 FLU IMMUNIZE NO ADMIN: HCPCS | Performed by: PSYCHIATRY & NEUROLOGY

## 2019-03-05 PROCEDURE — 1123F ACP DISCUSS/DSCN MKR DOCD: CPT | Performed by: PSYCHIATRY & NEUROLOGY

## 2019-03-05 PROCEDURE — G8427 DOCREV CUR MEDS BY ELIG CLIN: HCPCS | Performed by: PSYCHIATRY & NEUROLOGY

## 2019-03-05 PROCEDURE — G8417 CALC BMI ABV UP PARAM F/U: HCPCS | Performed by: PSYCHIATRY & NEUROLOGY

## 2019-03-05 PROCEDURE — 1101F PT FALLS ASSESS-DOCD LE1/YR: CPT | Performed by: PSYCHIATRY & NEUROLOGY

## 2019-03-05 PROCEDURE — 1036F TOBACCO NON-USER: CPT | Performed by: PSYCHIATRY & NEUROLOGY

## 2019-03-05 PROCEDURE — 99214 OFFICE O/P EST MOD 30 MIN: CPT | Performed by: PSYCHIATRY & NEUROLOGY

## 2019-03-05 PROCEDURE — G8598 ASA/ANTIPLAT THER USED: HCPCS | Performed by: PSYCHIATRY & NEUROLOGY

## 2019-03-05 PROCEDURE — 4040F PNEUMOC VAC/ADMIN/RCVD: CPT | Performed by: PSYCHIATRY & NEUROLOGY

## 2019-03-05 RX ORDER — DONEPEZIL HYDROCHLORIDE 10 MG/1
10 TABLET, FILM COATED ORAL NIGHTLY
Qty: 90 TABLET | Refills: 0 | Status: SHIPPED | OUTPATIENT
Start: 2019-03-05 | End: 2019-06-25 | Stop reason: SDUPTHER

## 2019-03-14 ENCOUNTER — OFFICE VISIT (OUTPATIENT)
Dept: FAMILY MEDICINE CLINIC | Age: 79
End: 2019-03-14
Payer: MEDICARE

## 2019-03-14 VITALS
OXYGEN SATURATION: 96 % | WEIGHT: 193.4 LBS | BODY MASS INDEX: 30.35 KG/M2 | DIASTOLIC BLOOD PRESSURE: 80 MMHG | RESPIRATION RATE: 16 BRPM | SYSTOLIC BLOOD PRESSURE: 130 MMHG | HEART RATE: 77 BPM | HEIGHT: 67 IN

## 2019-03-14 DIAGNOSIS — R73.01 IMPAIRED FASTING GLUCOSE: Chronic | ICD-10-CM

## 2019-03-14 DIAGNOSIS — R29.6 RECURRENT FALLS: ICD-10-CM

## 2019-03-14 DIAGNOSIS — Z91.81 AT HIGH RISK FOR FALLS: ICD-10-CM

## 2019-03-14 DIAGNOSIS — E78.2 MIXED HYPERTRIGLYCERIDEMIA: Chronic | ICD-10-CM

## 2019-03-14 DIAGNOSIS — Z12.5 PROSTATE CANCER SCREENING: ICD-10-CM

## 2019-03-14 DIAGNOSIS — K21.9 GASTROESOPHAGEAL REFLUX DISEASE WITHOUT ESOPHAGITIS: Chronic | ICD-10-CM

## 2019-03-14 DIAGNOSIS — N40.0 BENIGN PROSTATIC HYPERPLASIA, UNSPECIFIED WHETHER LOWER URINARY TRACT SYMPTOMS PRESENT: Chronic | ICD-10-CM

## 2019-03-14 DIAGNOSIS — I10 BENIGN ESSENTIAL HYPERTENSION: Primary | Chronic | ICD-10-CM

## 2019-03-14 LAB — PROSTATE SPECIFIC ANTIGEN: 2.73 NG/ML (ref 0–4)

## 2019-03-14 PROCEDURE — G8598 ASA/ANTIPLAT THER USED: HCPCS | Performed by: FAMILY MEDICINE

## 2019-03-14 PROCEDURE — 1036F TOBACCO NON-USER: CPT | Performed by: FAMILY MEDICINE

## 2019-03-14 PROCEDURE — 1123F ACP DISCUSS/DSCN MKR DOCD: CPT | Performed by: FAMILY MEDICINE

## 2019-03-14 PROCEDURE — 36415 COLL VENOUS BLD VENIPUNCTURE: CPT | Performed by: FAMILY MEDICINE

## 2019-03-14 PROCEDURE — 4040F PNEUMOC VAC/ADMIN/RCVD: CPT | Performed by: FAMILY MEDICINE

## 2019-03-14 PROCEDURE — 1101F PT FALLS ASSESS-DOCD LE1/YR: CPT | Performed by: FAMILY MEDICINE

## 2019-03-14 PROCEDURE — G8482 FLU IMMUNIZE ORDER/ADMIN: HCPCS | Performed by: FAMILY MEDICINE

## 2019-03-14 PROCEDURE — 99214 OFFICE O/P EST MOD 30 MIN: CPT | Performed by: FAMILY MEDICINE

## 2019-03-14 PROCEDURE — G8427 DOCREV CUR MEDS BY ELIG CLIN: HCPCS | Performed by: FAMILY MEDICINE

## 2019-03-14 PROCEDURE — G8417 CALC BMI ABV UP PARAM F/U: HCPCS | Performed by: FAMILY MEDICINE

## 2019-03-14 RX ORDER — LISINOPRIL 40 MG/1
TABLET ORAL
Qty: 90 TABLET | Refills: 1 | Status: SHIPPED | OUTPATIENT
Start: 2019-03-14 | End: 2019-09-19 | Stop reason: SDUPTHER

## 2019-03-14 ASSESSMENT — PATIENT HEALTH QUESTIONNAIRE - PHQ9
2. FEELING DOWN, DEPRESSED OR HOPELESS: 0
SUM OF ALL RESPONSES TO PHQ9 QUESTIONS 1 & 2: 0
SUM OF ALL RESPONSES TO PHQ QUESTIONS 1-9: 0
SUM OF ALL RESPONSES TO PHQ QUESTIONS 1-9: 0
1. LITTLE INTEREST OR PLEASURE IN DOING THINGS: 0

## 2019-03-14 ASSESSMENT — ENCOUNTER SYMPTOMS
WHEEZING: 0
CHOKING: 1
COUGH: 0
CHEST TIGHTNESS: 0
SHORTNESS OF BREATH: 0

## 2019-03-18 RX ORDER — TRIAMCINOLONE ACETONIDE 1 MG/G
CREAM TOPICAL
Qty: 454 G | Refills: 0 | Status: SHIPPED | OUTPATIENT
Start: 2019-03-18 | End: 2019-07-09 | Stop reason: SDUPTHER

## 2019-03-26 RX ORDER — HYDROCHLOROTHIAZIDE 12.5 MG/1
12.5 CAPSULE, GELATIN COATED ORAL EVERY MORNING
Qty: 90 CAPSULE | Refills: 1 | Status: SHIPPED | OUTPATIENT
Start: 2019-03-26 | End: 2019-09-19 | Stop reason: SDUPTHER

## 2019-03-26 RX ORDER — HYDROCHLOROTHIAZIDE 25 MG/1
TABLET ORAL
Qty: 90 TABLET | Refills: 1 | Status: CANCELLED | OUTPATIENT
Start: 2019-03-26

## 2019-03-29 ENCOUNTER — HOSPITAL ENCOUNTER (OUTPATIENT)
Dept: PHYSICAL THERAPY | Age: 79
Setting detail: THERAPIES SERIES
Discharge: HOME | End: 2019-03-29
Payer: MEDICARE

## 2019-03-29 PROCEDURE — 97110 THERAPEUTIC EXERCISES: CPT

## 2019-03-29 PROCEDURE — 97162 PT EVAL MOD COMPLEX 30 MIN: CPT

## 2019-03-29 PROCEDURE — 97530 THERAPEUTIC ACTIVITIES: CPT

## 2019-04-01 ENCOUNTER — OFFICE VISIT (OUTPATIENT)
Dept: FAMILY MEDICINE CLINIC | Age: 79
End: 2019-04-01
Payer: MEDICARE

## 2019-04-01 VITALS
RESPIRATION RATE: 18 BRPM | DIASTOLIC BLOOD PRESSURE: 82 MMHG | OXYGEN SATURATION: 98 % | HEIGHT: 67 IN | WEIGHT: 193 LBS | HEART RATE: 81 BPM | BODY MASS INDEX: 30.29 KG/M2 | SYSTOLIC BLOOD PRESSURE: 128 MMHG

## 2019-04-01 DIAGNOSIS — N39.41 URGE INCONTINENCE OF URINE: Primary | ICD-10-CM

## 2019-04-01 DIAGNOSIS — N41.1 CHRONIC PROSTATITIS: ICD-10-CM

## 2019-04-01 DIAGNOSIS — N32.81 OAB (OVERACTIVE BLADDER): ICD-10-CM

## 2019-04-01 DIAGNOSIS — N40.0 BENIGN PROSTATIC HYPERPLASIA, UNSPECIFIED WHETHER LOWER URINARY TRACT SYMPTOMS PRESENT: ICD-10-CM

## 2019-04-01 PROCEDURE — 1036F TOBACCO NON-USER: CPT | Performed by: FAMILY MEDICINE

## 2019-04-01 PROCEDURE — G8417 CALC BMI ABV UP PARAM F/U: HCPCS | Performed by: FAMILY MEDICINE

## 2019-04-01 PROCEDURE — G8598 ASA/ANTIPLAT THER USED: HCPCS | Performed by: FAMILY MEDICINE

## 2019-04-01 PROCEDURE — G8427 DOCREV CUR MEDS BY ELIG CLIN: HCPCS | Performed by: FAMILY MEDICINE

## 2019-04-01 PROCEDURE — 4040F PNEUMOC VAC/ADMIN/RCVD: CPT | Performed by: FAMILY MEDICINE

## 2019-04-01 PROCEDURE — 1123F ACP DISCUSS/DSCN MKR DOCD: CPT | Performed by: FAMILY MEDICINE

## 2019-04-01 PROCEDURE — 99213 OFFICE O/P EST LOW 20 MIN: CPT | Performed by: FAMILY MEDICINE

## 2019-04-01 SDOH — ECONOMIC STABILITY: TRANSPORTATION INSECURITY
IN THE PAST 12 MONTHS, HAS THE LACK OF TRANSPORTATION KEPT YOU FROM MEDICAL APPOINTMENTS OR FROM GETTING MEDICATIONS?: NO

## 2019-04-01 SDOH — ECONOMIC STABILITY: TRANSPORTATION INSECURITY
IN THE PAST 12 MONTHS, HAS LACK OF TRANSPORTATION KEPT YOU FROM MEETINGS, WORK, OR FROM GETTING THINGS NEEDED FOR DAILY LIVING?: NO

## 2019-04-01 ASSESSMENT — ENCOUNTER SYMPTOMS
VOMITING: 0
NAUSEA: 0

## 2019-04-01 NOTE — PATIENT INSTRUCTIONS
Emily Pringle was seen today for other. Diagnoses and all orders for this visit:    Urge incontinence of urine  Stable. Try increase of fish oil to 2 or 3 x/day. OAB (overactive bladder)  Continue Myrbetriq. Chronic prostatitis  No pain nor bogginess. Benign prostatic hyperplasia, unspecified whether lower urinary tract symptoms present  Only very slightly enlarged. Ref.  Range 3/14/2019 15:22   Prostatic Specific Ag  0.00 - 4.00 ng/mL 2.73

## 2019-04-01 NOTE — PROGRESS NOTES
Subjective:      Patient ID: Clemente Barber is a 70 y.o. male. Chief Complaint   Patient presents with    Other     PT HERE FOR ROUTINE PROSTATE EXAM   120  Benign Prostatic Hypertrophy   This is a chronic problem. The current episode started more than 1 year ago. The problem is unchanged. Irritative symptoms include nocturia (1x) and urgency. Irritative symptoms do not include frequency. Obstructive symptoms include dribbling. Obstructive symptoms do not include incomplete emptying, an intermittent stream, a slower stream, straining or a weak stream. Pertinent negatives include no chills, dysuria, genital pain, hematuria, hesitancy, nausea or vomiting. AUA score is 0-7. He is not sexually active. The symptoms are aggravated by caffeine (pop makes worse, trouble getting out of chair). Past treatments include tamsulosin (Myrbetriq, oxybutnin). The treatment provided mild relief. Father  metastatic prostate cancer. He is taking fish oil once a day. IPSS Questionnaire (AUA-7): Over the past month    1)  How often have you had a sensation of not emptying your bladder completely after you finish urinating? 0 - Not at all   2)  How often have you had to urinate again less than two hours after you finished urinating? 0 - Not at all   3)  How often have you found you stopped and started again several times when you urinated? 0 - Not at all   4) How difficult have you found it to postpone urination? 5 - Almost always   5) How often have you had a weak urinary stream?  0 - Not at all   6) How often have you had to push or strain to begin urination? 0 - Not at all   7) How many times did you most typically get up to urinate from the time you went to bed until the time you got up in the morning?   1 - 1 time   Total score:  0-7 mildly symptomatic = 6    8-19 moderately symptomatic    20-35 severely symptomatic     Current Outpatient Medications   Medication Sig Dispense Refill    hydrochlorothiazide (MICROZIDE) 12.5 MG capsule Take 1 capsule by mouth every morning 90 capsule 1    triamcinolone (KENALOG) 0.1 % cream APPLY TOPICALLY TO THE AFFECTED AREA(S) TWICE DAILY AS DIRECTED 454 g 0    lisinopril (PRINIVIL;ZESTRIL) 40 MG tablet TAKE 1 TABLET EVERY DAY FOR HIGH BLOOD PRESSURE 90 tablet 1    carbidopa-levodopa (SINEMET)  MG per tablet Take 1-1/2 tablet by mouth 4 times daily 180 tablet 1    donepezil (ARICEPT) 10 MG tablet Take 1 tablet by mouth nightly 90 tablet 0    clobetasol (TEMOVATE) 0.05 % cream APPLY TOPICALLY TWICE DAILY AS DIRECTED 15 g 2    MYRBETRIQ 50 MG TB24 TAKE 1 TABLET EVERY MORNING 90 tablet 3    simvastatin (ZOCOR) 10 MG tablet TAKE 1 TABLET EVERY NIGHT 90 tablet 3    B Complex-C-Folic Acid TABS Take 1 tablet by mouth daily      BIOTIN FORTE PO Take 1 tablet by mouth daily       budesonide-formoterol (SYMBICORT) 160-4.5 MCG/ACT AERO Inhale 2 puffs into the lungs 2 times daily Use spacer. Rinse and spit. 1 Inhaler 1    Cyanocobalamin (VITAMIN B-12 PO) Take 1 tablet by mouth daily       Multiple Vitamins-Minerals (EYE VITAMINS PO) Take 1 tablet by mouth daily      aspirin 81 MG EC tablet Take 81 mg by mouth daily. stopped for 1 week day  Indications: Cardiovascular Risk Reduction      Multiple Vitamin (MULTIVITAMIN PO) Take 1 tablet by mouth daily       Omega-3 Fatty Acids (OMEGA-3 PO) Take 1 tablet by mouth daily       Cholecalciferol (VITAMIN D3) 1000 UNIT CAPS Take  by mouth daily. Stopped for surgery 1 week ago       No current facility-administered medications for this visit. Review of Systems   Constitutional: Negative for chills, diaphoresis and fever. Gastrointestinal: Negative for nausea and vomiting. Genitourinary: Positive for nocturia (1x) and urgency. Negative for dysuria, frequency, hematuria, hesitancy and incomplete emptying. Objective:   Physical Exam   Constitutional: Vital signs are normal. He appears well-developed. He is cooperative.  He does not have a sickly appearance. He does not appear ill. No distress. Genitourinary: Rectal exam shows external hemorrhoid. Rectal exam shows no internal hemorrhoid, no fissure, no mass, no tenderness and anal tone normal. Prostate is not enlarged (about 2.5-3 fingers wide smooth, not boggy, no masses.) and not tender. Neurological: He is alert. Skin: Skin is warm and dry. He is not diaphoretic. No pallor. Psychiatric: He has a normal mood and affect. His speech is normal and behavior is normal. Judgment normal. He is not actively hallucinating. Cognition and memory are impaired. He exhibits abnormal recent memory and abnormal remote memory. Patient is here by himself and is doing a fair job as a historian. He is attentive. Vitals:    04/01/19 1306   BP: 128/82   Site: Left Upper Arm   Position: Sitting   Cuff Size: Medium Adult   Pulse: 81   Resp: 18   SpO2: 98%   Weight: 193 lb (87.5 kg)  Comment: shoes on   Height: 5' 7\" (1.702 m)     BP Readings from Last 3 Encounters:   04/01/19 128/82   03/14/19 130/80   03/05/19 122/85     Pulse Readings from Last 3 Encounters:   04/01/19 81   03/14/19 77   03/05/19 82     Wt Readings from Last 3 Encounters:   04/01/19 193 lb (87.5 kg)   03/14/19 193 lb 6.4 oz (87.7 kg)   03/05/19 194 lb (88 kg)     Body mass index is 30.23 kg/m². Ref. Range 3/14/2019 15:22   Prostatic Specific Ag  0.00 - 4.00 ng/mL 2.73     Assessment:      1. Urge incontinence of urine    2. OAB (overactive bladder)    3. Chronic prostatitis    4. Benign prostatic hyperplasia, unspecified whether lower urinary tract symptoms present          Plan:        Tito Adamson was seen today for other. Diagnoses and all orders for this visit:    Urge incontinence of urine   Stable. Try increase of fish oil to 2 or 3 x/day. Explained that the fish oil lines the bladder and decreases bladder irritability. It will not prevent problems like when he suddenly stands.  The increase in abdominal pressure on the bladder

## 2019-04-10 ENCOUNTER — HOSPITAL ENCOUNTER (OUTPATIENT)
Dept: PHYSICAL THERAPY | Age: 79
Setting detail: THERAPIES SERIES
Discharge: HOME | End: 2019-04-10
Payer: MEDICARE

## 2019-04-10 PROCEDURE — 97530 THERAPEUTIC ACTIVITIES: CPT

## 2019-04-10 PROCEDURE — 97110 THERAPEUTIC EXERCISES: CPT

## 2019-04-10 NOTE — FLOWSHEET NOTE
Physical Therapy Daily Treatment Note  Date:  4/10/2019    Patient Name:  Leatha Almeida    :  1940  MRN: [id number]  Restrictions/Precautions:    Medical/Treatment Diagnosis Information:   · Diagnosis: Recurrent Falls   · Treatment Diagnosis: Difficulty walking , imbalance , BLE muscle weakness     Tracking Information:  Physician Information Referring Practitioner: Rosa Elliott MD      Plan of Care Sent Date: 3/29 Signed Received: 19   Visit Count / Total Visits      Insurance Approved Visits  / Ivette Garcias Approved Dates:     Insurance Information PT Insurance Information: Azam Marlys   *If only certain CPT codes approved use smart phrase CPT calculator . OPPTINSURANCECPTCODECALCULATOR   Progress Note/G-codes   []  Yes  []  No Next Due:      Pain level: /10     Subjective:  Patient reports that he had no recent falls lately since last session .      Objective:    Observation: 4/10: forward trunk posture w/ ambulation   Test measurements:      Exercises:  Exercise/Equipment Resistance/Repetitions Other comments   Bike/ Nustep  Bike  X 3 min to reduced tone     Hip flexor stretch  HSS 2x20  2x20     IB /HR 2x30/2x10    Balance training  Cable: Walkouts  Fwd/bwd  3X reps ea    // bars  Stepping over cones x 5  Picking up cones x5   Standing on wedge fwd/bwd x 30s  Toe taps x 10 B   Fwd /lat step ups 4\" x10   Tandem walking x 2 laps   bwd walking x 2 laps                                                          Other Therapeutic Activities:      Home Exercise Program:  3/29 The following exercises were performed and added to the pt's home program (educated on appropriate frequency, intensity and duration etc.): Handout issued for sit to stand and scap retrac    Manual Treatments:      Modalities:      Timed Code Treatment Minutes:  54    Total Treatment Minutes:  54    Treatment/Activity Tolerance:  [x] Patient tolerated treatment well [] Patient limited by fatigue  [] Patient limited by pain  [] Patient limited by other medical complications  [] Other:     Prognosis: [x] Good [] Fair  [] Poor    Patient Requires Follow-up: [x] Yes  [] No    Plan:   [] Continue per plan of care [] Alter current plan (see comments)  [x] Plan of care initiated [] Hold pending MD visit [] Discharge  Plan for Next Session:  Begin balance training, postural     Electronically signed by:  Christian Elaine, PT , DPT, CWC

## 2019-04-16 ENCOUNTER — HOSPITAL ENCOUNTER (OUTPATIENT)
Dept: PHYSICAL THERAPY | Age: 79
Setting detail: THERAPIES SERIES
Discharge: HOME | End: 2019-04-16
Payer: MEDICARE

## 2019-04-16 PROCEDURE — 97530 THERAPEUTIC ACTIVITIES: CPT

## 2019-04-16 PROCEDURE — 97116 GAIT TRAINING THERAPY: CPT

## 2019-04-16 PROCEDURE — 97110 THERAPEUTIC EXERCISES: CPT

## 2019-04-16 NOTE — FLOWSHEET NOTE
Physical Therapy Daily Treatment Note  Date:  2019    Patient Name:  Nia Khan  \"ДМИТРИЙ\"  :  1940  MRN: [id number]  Restrictions/Precautions:  [facility]  Medical/Treatment Diagnosis Information:   · Diagnosis: Recurrent Falls   · Treatment Diagnosis: Difficulty walking , imbalance , BLE muscle weakness     Tracking Information:  Physician Information Referring Practitioner: Otilia Galaviz MD      Plan of Care Sent Date: 3/29 Signed Received: 19   Visit Count / Total Visits  3/8    Insurance Approved Visits  / Reji Munoz Approved Dates:     Insurance Information PT Insurance Information: [employer]   Progress Note/G-codes   []  Yes  [x]  No Next Due:      Pain level: 0/10     Subjective:  Pt reports no pain, he is just slow moving. Pt states he has Parkinson's, and he finds himself taking smaller steps and his balance isn't as good.      Objective:    Observation:   : Amb with SPC, forward trunk flexion, increased stability with gait when using RW, but required cueing with turning   4/10: forward trunk posture w/ ambulation   Test measurements:      Exercises:  Exercise/Equipment Resistance/Repetitions Other comments   Bike/ Nustep   to reduced tone   Nustep L3 x 5 min     stretch   HS 2 x20 \" B       : HS stretch completed on stairs    Balance training      // bars      Airex:   DLS x 03\"  DLS EC x 63\"   Tandem stance 2 x 30\" B           : Step ups completed on the stairs         Verbal cueing to stand upright       : Finger tip touch used 25% of time in tandem stance   - pt closed eyes for about 10 sec and kept his balance   Gait  3 laps with walker - cueing for large step length and upright posture   3 laps with walker and intermittent turning (See other TA)  : cueing to keep feet in walker as he turns, not lift walker as he turns, not move feet and walker at the same time and to slow the speed of his turns   mat Sit to stands x 10, CGA : Mat set to 46 cm high

## 2019-04-24 ENCOUNTER — HOSPITAL ENCOUNTER (OUTPATIENT)
Dept: PHYSICAL THERAPY | Age: 79
Setting detail: THERAPIES SERIES
Discharge: HOME | End: 2019-04-24
Payer: MEDICARE

## 2019-04-24 PROCEDURE — 97116 GAIT TRAINING THERAPY: CPT

## 2019-04-24 PROCEDURE — 97530 THERAPEUTIC ACTIVITIES: CPT

## 2019-04-24 NOTE — FLOWSHEET NOTE
Physical Therapy Daily Treatment Note  Date:  2019    Patient Name:  Nihariak Duarte  \"ДМИТРИЙ\"  :  1940  MRN: [id number]  Restrictions/Precautions:  [facility]  Medical/Treatment Diagnosis Information:   · Diagnosis: Recurrent Falls   · Treatment Diagnosis: Difficulty walking , imbalance , BLE muscle weakness     Tracking Information:  Physician Information Referring Practitioner: Guillermo Ziegler MD      Plan of Care Sent Date: 3/29 Signed Received: 19   Visit Count / Total Visits      Insurance Approved Visits  / Shobha Abraham Approved Dates:     Insurance Information PT Insurance Information: [employer] Gold   Progress Note/G-codes   []  Yes  [x]  No Next Due:      Pain level: 0/10     Subjective:  Pt reports that he has been participating in [employer] program   Pt reports no pain, he is just slow moving. Pt states he has Parkinson's, and he finds himself taking smaller steps and his balance isn't as good.      Objective:    Observation:   : Amb with SPC, forward trunk flexion, increased stability with gait when using RW, but required cueing with turning   4/10: forward trunk posture w/ ambulation   Test measurements:      Exercises:  Exercise/Equipment Resistance/Repetitions Other comments   Bike/ Nustep   to reduced tone   Nustep L3 x 5 min     stretch   HS 2 x20 \" B       : HS stretch completed on stairs    Balance training      // bars  Stepping over cones x 5  Picking up cones x5 Toe taps x 10 B   Fwd /lat step ups 4\" x10 B  Side stepping over cones (2) x 1 lap       Airex:   DLS x 74\"  DLS EC x 33\"   Tandem stance 2 x 30\" B           : Step ups completed on the stairs         Verbal cueing to stand upright       : Finger tip touch used 25% of time in tandem stance   - pt closed eyes for about 10 sec and kept his balance   Gait  1 laps w/o walker - cueing for large step length and upright posture    : cueing to keep feet in walker as he turns, not lift walker as he turns, not move feet

## 2019-04-29 ENCOUNTER — HOSPITAL ENCOUNTER (OUTPATIENT)
Dept: PHYSICAL THERAPY | Age: 79
Setting detail: THERAPIES SERIES
Discharge: HOME | End: 2019-04-29
Payer: MEDICARE

## 2019-04-29 PROCEDURE — 97110 THERAPEUTIC EXERCISES: CPT

## 2019-04-29 PROCEDURE — 97530 THERAPEUTIC ACTIVITIES: CPT

## 2019-04-29 NOTE — FLOWSHEET NOTE
Physical Therapy Daily Treatment Note  Date:  2019    Patient Name:  Sam Goodwin  \"ДМИТРИЙ\"  :  1940  MRN: [id number]  Restrictions/Precautions:  [facility]  Medical/Treatment Diagnosis Information:   · Diagnosis: Recurrent Falls   · Treatment Diagnosis: Difficulty walking , imbalance , BLE muscle weakness     Tracking Information:  Physician Information Referring Practitioner: Deepa Mcdonald MD      Plan of Care Sent Date: 3/29 Signed Received: 19   Visit Count / Total Visits      Insurance Approved Visits  / Jael Gillette Approved Dates:     Insurance Information PT Insurance Information: [employer] Gold   Progress Note/G-codes   []  Yes  [x]  No Next Due:      Pain level: 0/10     Subjective:  Pt reports that  he has been using the walker less. Pt reports that he has been participating in [employer] program   Pt reports no pain, he is just slow moving. Pt states he has Parkinson's, and he finds himself taking smaller steps and his balance isn't as good.      Objective:    Observation:   : Amb with SPC, forward trunk flexion, increased stability with gait when using RW, but required cueing with turning   4/10: forward trunk posture w/ ambulation   Test measurements:      Exercises:  Exercise/Equipment Resistance/Repetitions Other comments   Bike/ Nustep   to reduced tone   Nustep L3 x 5 min     stretch   HS 2 x20 \" B    IB /HR:    2x30/2x10   : HS stretch completed on stairs    Balance training      // bars  Stepping over cones x 5  Picking up cones x5 Standing on wedge fwd/bwd x 30sToe taps x 10 B   Fwd /lat step ups 4\" x10 B  Side stepping over cones (2) x 1 lap       Airex:   DLS x 34\"  DLS EC x 41\"   Tandem stance 2 x 30\" B  Grey mat          : Step ups completed on the stairs   Grey Mat          Verbal cueing to stand upright       : Finger tip touch used 25% of time in tandem stance   - pt closed eyes for about 10 sec and kept his balance   Gait  1 laps w/o walker - cueing

## 2019-05-06 ENCOUNTER — HOSPITAL ENCOUNTER (OUTPATIENT)
Dept: PHYSICAL THERAPY | Age: 79
Setting detail: THERAPIES SERIES
Discharge: HOME | End: 2019-05-06
Payer: MEDICARE

## 2019-05-06 PROCEDURE — 97110 THERAPEUTIC EXERCISES: CPT

## 2019-05-06 PROCEDURE — 97530 THERAPEUTIC ACTIVITIES: CPT

## 2019-05-06 PROCEDURE — 97112 NEUROMUSCULAR REEDUCATION: CPT

## 2019-05-06 NOTE — FLOWSHEET NOTE
Physical Therapy Daily Treatment Note  Date:  2019    Patient Name:  Guyann Apley  \"ДМИТРИЙ\"  :  1940  MRN: [id number]  Restrictions/Precautions:  [facility]  Medical/Treatment Diagnosis Information:   · Diagnosis: Recurrent Falls   · Treatment Diagnosis: Difficulty walking , imbalance , BLE muscle weakness     Tracking Information:  Physician Information Referring Practitioner: Que Wetzel MD      Plan of Care Sent Date: 3/29 Signed Received: 19   Visit Count / Total Visits      Insurance Approved Visits  / Darío Michelle Approved Dates:     Insurance Information PT Insurance Information: [employer] Gold   Progress Note/G-codes   []  Yes  [x]  No Next Due:      Pain level: 0/10     Subjective:  Patient reports that continues to do well at home with ambulation . States that he is using cane more than walker lately and feels like his balance is getting better since starting therapy. Pt reports that  he has been using the walker less.    .     Objective:    Observation:   : Amb with SPC, forward trunk flexion, increased stability with gait when using RW, but required cueing with turning   4/10: forward trunk posture w/ ambulation   Test measurements:      Exercises:  Exercise/Equipment Resistance/Repetitions Other comments   Bike/ Nustep   to reduced tone   Nustep L4 x 5 min     stretch   HS 2 x20 \" B    IB /HR:    2+1x30/2x10   : HS stretch completed on stairs    Balance training      // bars  Stepping over cones x 5  stepping backwards over cones x 10 B  Standing on wedge: fwd/bwd x 30s  Stepping up and back x 10 BToe taps x 10 B   Fwd step ups 4\" x10 B  Side stepping over cones (2) x 5 lap   Tandem walking x 3 laps   bwd walking x 3 laps       Grey mat          : Step ups completed on the stairs   Grey Mat          Verbal cueing to stand upright       : Finger tip touch used 25% of time in tandem stance   - pt closed eyes for about 10 sec and kept his balance   Gait  2 laps w/o walker - cueing for large step length and upright posture    4/16: cueing to keep feet in walker as he turns, not lift walker as he turns, not move feet and walker at the same time and to slow the speed of his turns   mat Sit to stands x 10, CGA 4/16: Mat set to 46 cm high  5/6: sit to stand from chair near // bars    More Gait activities next visit                                           Other Therapeutic Activities:  4/16 pt was educated on the use of a walker to increase his safety, he stated that he would bring it in the next visit to allow PT to adjust it to a proper fit. Pt needed cueing for proper turning with RW, was cued to keep move walker then move feet to keep feet inside of walker for increased safety. Pt demo'd improvement within the session, especially after PT demonstrated. Also discussed several times the importance of reaching back     Home Exercise Program:  3/29 The following exercises were performed and added to the pt's home program (educated on appropriate frequency, intensity and duration etc.): Handout issued for sit to stand and scap retrac    Manual Treatments:      Modalities:      Timed Code Treatment Minutes:  50  Total Treatment Minutes:   50     Treatment/Activity Tolerance:  [x] Patient tolerated treatment well [] Patient limited by fatigue  [] Patient limited by pain  [] Patient limited by other medical complications  [] Other: Pt demonstrated steady gait with SPC in today's session. He stated that the one time he did fall he was stepping back off of a scale so    Today's session was focussed on activities with walking backwards.    Prognosis: [x] Good [] Fair  [] Poor    Patient Requires Follow-up: [x] Yes  [] No    Plan:   [x] Continue per plan of care [] Alter current plan (see comments)  [] Plan of care initiated [] Hold pending MD visit [] Discharge  Plan for Next Session:  Begin balance training, postural     Electronically signed by:  Katelin Pino, PT , DPT [id number]

## 2019-05-13 ENCOUNTER — HOSPITAL ENCOUNTER (OUTPATIENT)
Dept: PHYSICAL THERAPY | Age: 79
Setting detail: THERAPIES SERIES
Discharge: HOME | End: 2019-05-13
Payer: MEDICARE

## 2019-05-13 PROCEDURE — 97116 GAIT TRAINING THERAPY: CPT

## 2019-05-13 PROCEDURE — 97110 THERAPEUTIC EXERCISES: CPT

## 2019-05-13 PROCEDURE — 97530 THERAPEUTIC ACTIVITIES: CPT

## 2019-05-13 NOTE — FLOWSHEET NOTE
Physical Therapy Daily Treatment Note  Date:  2019    Patient Name:  Blayne Sunshine  \"ДМИТРИЙ\"  :  1940  MRN: [id number]  Restrictions/Precautions:  [facility]  Medical/Treatment Diagnosis Information:   · Diagnosis: Recurrent Falls   · Treatment Diagnosis: Difficulty walking , imbalance , BLE muscle weakness     Tracking Information:  Physician Information Referring Practitioner: Sergey Odonnell MD      Plan of Care Sent Date: 3/29 Signed Received: 19   Visit Count / Total Visits      Insurance Approved Visits  / Elena Cast Approved Dates:     Insurance Information PT Insurance Information: [employer] Gold   Progress Note/G-codes   []  Yes  [x]  No Next Due:      Pain level: 0/10     Subjective:   Patient reports that he had a good weekend with no incidents     .      Objective:    Observation:   : Amb with SPC, forward trunk flexion, increased stability with gait when using RW, but required cueing with turning   4/10: forward trunk posture w/ ambulation   Test measurements:      Exercises:  Exercise/Equipment Resistance/Repetitions Other comments   Bike/ Nustep  Bike  X 5 min to reduced tone   Nustep L4 x 5 min     stretch   HS 2 x20 \" B    IB /HR:    2+1x30/2x10   : HS stretch completed on stairs    Balance training  Cable: Walkouts  Ea way  3X reps  3pl    // bars  Stepping over cones x 5  stepping backwards over cones x 10 B  Standing on wedge: fwd/bwd x 30s  Stepping up and back x 10 BToe taps x 10 B   Fwd step ups 6\" x10 B     Stepping fwd/bwd/lateral over small blue [place] x5   Tandem walking x 3 laps   bwd walking x 3 laps       Grey mat          : Step ups completed on the stairs   Grey Mat          Verbal cueing to stand upright       : Finger tip touch used 25% of time in tandem stance   - pt closed eyes for about 10 sec and kept his balance   Gait  2 laps around clinic  w/o   - cueing  upright posture      : cueing to keep feet in walker as he turns, not lift walker as he

## 2019-05-20 ENCOUNTER — HOSPITAL ENCOUNTER (OUTPATIENT)
Dept: PHYSICAL THERAPY | Age: 79
Setting detail: THERAPIES SERIES
Discharge: HOME | End: 2019-05-20
Payer: MEDICARE

## 2019-05-20 PROCEDURE — 97530 THERAPEUTIC ACTIVITIES: CPT

## 2019-05-20 PROCEDURE — 97110 THERAPEUTIC EXERCISES: CPT

## 2019-05-20 NOTE — FLOWSHEET NOTE
Physical Therapy Daily Treatment Note  Date:  2019    Patient Name:  Roberto Maxwell  \"ДМИТРИЙ\"  :  1940  MRN: [id number]  Restrictions/Precautions:  [facility]  Medical/Treatment Diagnosis Information:   · Diagnosis: Recurrent Falls   · Treatment Diagnosis: Difficulty walking , imbalance , BLE muscle weakness     Tracking Information:  Physician Information Referring Practitioner: Casey Mckeon MD      Plan of Care Sent Date: 3/29 Signed Received: 19   Visit Count / Total Visits      Insurance Approved Visits  / Vernard Meals Approved Dates:     Insurance Information PT Insurance Information: Kaya Keon   Progress Note/G-codes   []  Yes  [x]  No Next Due:      Pain level: 0/10     Subjective:   SEE PROGRESS NOTE   Patient reports that he had a good weekend with no incidents     .      Objective:    Observation:   : Amb with SPC, forward trunk flexion, increased stability with gait when using RW, but required cueing with turning   4/10: forward trunk posture w/ ambulation   Test measurements:      Exercises:  Exercise/Equipment Resistance/Repetitions Other comments   Bike/ Nustep     Nustep L4 x 5 min     stretch   HS 2 x20 \" B    IB /HR:    2+1x30/2x10   : HS stretch completed on stairs    Balance training  Cable: Walkouts  Ea way  3X reps  3pl    // bars  Stepping over cones x 5  stepping backwards over cones x 10 B  Standing on wedge: fwd/bwd x 30s  Stepping up and back x 10 BToe taps x 10 B   Fwd step ups 6\" x10 B     Stepping fwd/bwd/lateral over small blue Bolster x5   Tandem walking x 3 laps   bwd walking x 3 laps       Grey mat          : Step ups completed on the stairs   Grey Mat          Verbal cueing to stand upright       : Finger tip touch used 25% of time in tandem stance   - pt closed eyes for about 10 sec and kept his balance   Gait        : cueing to keep feet in walker as he turns, not lift walker as he turns, not move feet and walker at the same time and to slow the speed of his turns   mat  4/16: Mat set to 46 cm high  5/6: sit to stand from chair near // bars        [facility]                                      Other Therapeutic Activities:  4/16 pt was educated on the use of a walker to increase his safety, he stated that he would bring it in the next visit to allow PT to adjust it to a proper fit. Pt needed cueing for proper turning with RW, was cued to keep move walker then move feet to keep feet inside of walker for increased safety. Pt demo'd improvement within the session, especially after PT demonstrated. Also discussed several times the importance of reaching back     Home Exercise Program:  3/29 The following exercises were performed and added to the pt's home program (educated on appropriate frequency, intensity and duration etc.): Handout issued for sit to stand and scap retrac    Manual Treatments:      Modalities:      Timed Code Treatment Minutes:  49  Total Treatment Minutes:   49     Treatment/Activity Tolerance:  [x] Patient tolerated treatment well [] Patient limited by fatigue  [] Patient limited by pain  [] Patient limited by other medical complications  [] Other: Pt demonstrated steady gait with SPC in today's session. He stated that the one time he did fall he was stepping back off of a scale so    Today's session was focussed on activities with walking backwards.    Prognosis: [x] Good [] Fair  [] Poor    Patient Requires Follow-up: [x] Yes  [] No    Plan:   [x] Continue per plan of care [] Alter current plan (see comments)  [] Plan of care initiated [] Hold pending MD visit [] Discharge  Plan for Next Session:  Begin balance training, postural     Electronically signed by:  Soham Hope PT , DPT [id number]

## 2019-05-20 NOTE — PROGRESS NOTES
Outpatient Physical Therapy    Phone: [phone] Fax: [phone]    Physical Therapy Discharge Note  Date: 2019        Patient Name:  Krista Morgan    :  1940  MRN: [id number]  Restrictions/Precautions:      Medical/Treatment Diagnosis Information:  Diagnosis: Recurrent Falls   Treatment Diagnosis: Difficulty walking , imbalance , BLE muscle weakness   Insurance/Certification information:  PT Insurance Information: Jaylin Warner   Physician Information:  Referring Practitioner: Alta Marroquin MD   Plan of care signed (Y/N): faxed 19   Visit# / total visits:  6  Pain level:    Time Period for Report:  3/29/19 to 19  Cancels/No-shows to date:  0    Plan of Care/Treatment to date:  x? Therapeutic Exercise      x? Modalities:  x? Therapeutic Activity        ? Ultrasound    x? Gait Training        ? Cervical Traction   x? Neuromuscular Re-education      ? Cold/hotpack    x? Instruction in HEP        ? Lumbar Traction  x? Manual Therapy        ? Electrical Stimulation            ? Aquatic Therapy        ? Iontophoresis            ? Lymphedema management  ? Women's Health     Other:  ? Vestibular Rehab        ?    ?  Needed                        Significant Findings At Last Visit/Comments:    Subjective:  Subjective  Subjective: Patient states that he has seen improvement since starting therapy . He is now able to get around the house with better confidence .           Objective:  PROM RLE (degrees)  RLE PROM: WFL  PROM LLE (degrees)  LLE PROM: WFL  AROM LLE (degrees)  LLE AROM : WFL  AROM RLE (degrees)  RLE AROM: WFL  Strength RLE  R Hip Flexion: 4+/5  R Hip Extension: 4+/5  R Hip ABduction: 4+/5  R Hip ADduction: 4+/5  R Hip Internal Rotation: 4+/5  R Hip External Rotation: 4+/5  Strength LLE  L Hip Flexion: 4+/5  L Hip Extension: 4+/5  L Hip ABduction: 4+/5  L Hip ADduction: 4+/5  L Hip Internal Rotation: 4+/5  L Hip External Rotation: 4+/5  L Ankle Dorsiflexion: 4+/5  L Ankle Plantar Flexion: 4+/5  L Ankle Inversion: 4/5  L Ankle Eversion: 4/5       Assessment:  Conditions Requiring Skilled Therapeutic Intervention  Body structures, Functions, Activity limitations: Decreased balance  Assessment: Patient has completed 6 treatment sessions improving dynamic standing balance. Pt is now ambulating consistently without his cane around the house. Has not had any falls since starting therapy and is very aware of his functional  limitations and demonstrates good caution with transitional movements. Pt to be discahrged with HEP. Treatment Diagnosis: Difficulty walking , imbalance , BLE muscle weakness     Plan:   Plan  Plan Comment: Patient will be discharged with HEP           Progress towards goals:    Short term goals  Short term goal 1: Patient to improve Navarro score to 46/54 to reduce fall risk . -met  Long term goals  Long term goal 1: Patient to improve Navarro balance score score 49/56 to reduce fall risk . -met  Long term goal 2: Patient to improve sit to stand transition with difficulties /LOB . -met  Long term goal 3: Patient to ambulate on level /unlevel surfaces without LOB w/o Assistive device -ongoing partially met- amb w/o cane in house  Long term goal 4: Patient will be independent with HEP . -met     Current Frequency/Duration:  # Days per week: ? 1 day # Weeks: ? 1 week x? 4 weeks      x? 2 days   ? 2 weeks ? 5 weeks      ? 3 days   ? 3 weeks ? 6 weeks     Rehab Potential: x? Excellent ? Good ? Fair  ? Poor     Goal Status:  x? Achieved ? Partially Achieved  ? Not Achieved     Patient Status: ? Continue per initial plan of Care     x? Patient now discharged     ? Additional visits requested, Please re-certify for additional visits:      Requested frequency/duration:  X/week for weeks    Electronically signed by:  Allen Trinh PT    If you have any questions or concerns, please don't hesitate to call.   Thank you for your referral.    Physician Signature:________________________________Date:__________________  By signing above, therapists plan is approved by physician

## 2019-06-06 ENCOUNTER — OFFICE VISIT (OUTPATIENT)
Dept: FAMILY MEDICINE CLINIC | Age: 79
End: 2019-06-06
Payer: MEDICARE

## 2019-06-06 VITALS
HEIGHT: 67 IN | BODY MASS INDEX: 30.76 KG/M2 | HEART RATE: 76 BPM | DIASTOLIC BLOOD PRESSURE: 76 MMHG | SYSTOLIC BLOOD PRESSURE: 128 MMHG | OXYGEN SATURATION: 98 % | RESPIRATION RATE: 18 BRPM | WEIGHT: 196 LBS

## 2019-06-06 DIAGNOSIS — I10 BENIGN ESSENTIAL HYPERTENSION: Primary | Chronic | ICD-10-CM

## 2019-06-06 DIAGNOSIS — K21.9 GASTROESOPHAGEAL REFLUX DISEASE WITHOUT ESOPHAGITIS: Chronic | ICD-10-CM

## 2019-06-06 DIAGNOSIS — R29.6 RECURRENT FALLS: ICD-10-CM

## 2019-06-06 DIAGNOSIS — D64.89 ANEMIA DUE TO OTHER CAUSE, NOT CLASSIFIED: Chronic | ICD-10-CM

## 2019-06-06 DIAGNOSIS — N40.0 BENIGN PROSTATIC HYPERPLASIA, UNSPECIFIED WHETHER LOWER URINARY TRACT SYMPTOMS PRESENT: Chronic | ICD-10-CM

## 2019-06-06 PROCEDURE — G8417 CALC BMI ABV UP PARAM F/U: HCPCS | Performed by: FAMILY MEDICINE

## 2019-06-06 PROCEDURE — 1123F ACP DISCUSS/DSCN MKR DOCD: CPT | Performed by: FAMILY MEDICINE

## 2019-06-06 PROCEDURE — G8427 DOCREV CUR MEDS BY ELIG CLIN: HCPCS | Performed by: FAMILY MEDICINE

## 2019-06-06 PROCEDURE — G8598 ASA/ANTIPLAT THER USED: HCPCS | Performed by: FAMILY MEDICINE

## 2019-06-06 PROCEDURE — 1036F TOBACCO NON-USER: CPT | Performed by: FAMILY MEDICINE

## 2019-06-06 PROCEDURE — 99214 OFFICE O/P EST MOD 30 MIN: CPT | Performed by: FAMILY MEDICINE

## 2019-06-06 PROCEDURE — 4040F PNEUMOC VAC/ADMIN/RCVD: CPT | Performed by: FAMILY MEDICINE

## 2019-06-06 RX ORDER — UREA 10 %
800 LOTION (ML) TOPICAL DAILY
COMMUNITY
End: 2021-03-20 | Stop reason: SDUPTHER

## 2019-06-06 SDOH — ECONOMIC STABILITY: INCOME INSECURITY: HOW HARD IS IT FOR YOU TO PAY FOR THE VERY BASICS LIKE FOOD, HOUSING, MEDICAL CARE, AND HEATING?: NOT HARD AT ALL

## 2019-06-06 SDOH — ECONOMIC STABILITY: FOOD INSECURITY: WITHIN THE PAST 12 MONTHS, YOU WORRIED THAT YOUR FOOD WOULD RUN OUT BEFORE YOU GOT MONEY TO BUY MORE.: NEVER TRUE

## 2019-06-06 SDOH — ECONOMIC STABILITY: FOOD INSECURITY: WITHIN THE PAST 12 MONTHS, THE FOOD YOU BOUGHT JUST DIDN'T LAST AND YOU DIDN'T HAVE MONEY TO GET MORE.: NEVER TRUE

## 2019-06-06 ASSESSMENT — ENCOUNTER SYMPTOMS
WHEEZING: 1
CHEST TIGHTNESS: 0
SHORTNESS OF BREATH: 0
CHOKING: 0
COUGH: 0

## 2019-06-06 NOTE — PATIENT INSTRUCTIONS
Kemal Capone was seen today for hypertension. Diagnoses and all orders for this visit:    Benign essential hypertension  -     Good control.  -     Continue meds and lifestyle control. Benign prostatic hyperplasia, unspecified whether lower urinary tract symptoms present  -     Good control.  -     Continue meds and lifestyle control. Gastroesophageal reflux disease without esophagitis  Resolved. Anemia due to other cause, not classified  Stable. -     Continue meds and lifestyle control. Recurrent falls  Continue gym.

## 2019-06-06 NOTE — PROGRESS NOTES
 aspirin 81 MG EC tablet Take 81 mg by mouth daily. stopped for 1 week day  Indications: Cardiovascular Risk Reduction      Multiple Vitamin (MULTIVITAMIN PO) Take 1 tablet by mouth daily       Omega-3 Fatty Acids (OMEGA-3 PO) Take 1 tablet by mouth daily       Cholecalciferol (VITAMIN D3) 1000 UNIT CAPS Take  by mouth daily. Stopped for surgery 1 week ago      donepezil (ARICEPT) 10 MG tablet Take 1 tablet by mouth nightly 90 tablet 0     No current facility-administered medications for this visit. Review of Systems   Respiratory: Positive for wheezing (if exerting self). Negative for cough, choking, chest tightness and shortness of breath. Cardiovascular: Negative for chest pain, palpitations and leg swelling. Genitourinary: Positive for urgency. Negative for frequency. Neurological: Negative for dizziness, light-headedness and headaches. Objective:   Physical Exam   Constitutional: He appears well-developed and well-nourished. No distress. Eyes: Conjunctivae are normal. Right eye exhibits no discharge. Left eye exhibits no discharge. No scleral icterus. Neck: Trachea normal and phonation normal. Neck supple. No JVD present. No tracheal tenderness present. Carotid bruit is not present. No tracheal deviation present. No thyroid mass and no thyromegaly present. Cardiovascular: Normal rate, regular rhythm, S1 normal, S2 normal and normal heart sounds. Exam reveals no gallop, no S3, no S4, no distant heart sounds and no friction rub. No murmur heard. Pulmonary/Chest: Effort normal and breath sounds normal. No stridor. No respiratory distress. He has no decreased breath sounds. He has no wheezes. He has no rhonchi. He has no rales. Musculoskeletal:   Can do partial knee bends but feels unstable even with balance support. Lymphadenopathy:        Head (right side): No submandibular and no tonsillar adenopathy present.         Head (left side): No submandibular and no tonsillar adenopathy present. He has no cervical adenopathy. Right cervical: No superficial cervical, no deep cervical and no posterior cervical adenopathy present. Left cervical: No superficial cervical, no deep cervical and no posterior cervical adenopathy present. Neurological: He is alert. Gait (slow smaller steps.) abnormal.   Reflex Scores:       Patellar reflexes are 2+ on the right side and 2+ on the left side. Slight sway with Romberg but stays upright. Skin: Skin is warm and dry. He is not diaphoretic. No pallor. Psychiatric: He has a normal mood and affect. His speech is normal and behavior is normal. Judgment normal.   Nursing note and vitals reviewed. Vitals:    06/06/19 1335   BP: 128/76   Site: Left Upper Arm   Position: Sitting   Cuff Size: Medium Adult   Pulse: 76   Resp: 18   SpO2: 98%   Weight: 196 lb (88.9 kg)  Comment: shoes on   Height: 5' 7\" (1.702 m)     BP Readings from Last 3 Encounters:   06/06/19 128/76   04/01/19 128/82   03/14/19 130/80     Pulse Readings from Last 3 Encounters:   06/06/19 76   04/01/19 81   03/14/19 77     Wt Readings from Last 3 Encounters:   06/06/19 196 lb (88.9 kg)   04/01/19 193 lb (87.5 kg)   03/14/19 193 lb 6.4 oz (87.7 kg)     Body mass index is 30.7 kg/m². 1/17/2013 15:13 11/20/2014 11:23 7/18/2016 14:37 6/28/2017 14:38 3/14/2019 15:22   Diagnostic Psa  1.17      Prostatic Specific Ag 1.33  1.7 1.89 2.73   PSA, Free   0.4     PSA, Free Pct   24       Assessment:      1. Benign essential hypertension    2. Benign prostatic hyperplasia, unspecified whether lower urinary tract symptoms present    3. Gastroesophageal reflux disease without esophagitis    4. Anemia due to other cause, not classified    5. Recurrent falls          Plan:       Iam Franco was seen today for hypertension. Diagnoses and all orders for this visit:    Benign essential hypertension  -     Good control.  -     Continue meds and lifestyle control.      Benign prostatic hyperplasia, unspecified whether lower urinary tract symptoms present  -     Good control.  -     Continue meds and lifestyle control. Gastroesophageal reflux disease without esophagitis  Resolved. Anemia due to other cause, not classified  Stable. -     Continue meds and lifestyle control. Recurrent falls  Continue gym.        [place] [employer], DO

## 2019-06-25 ENCOUNTER — OFFICE VISIT (OUTPATIENT)
Dept: NEUROLOGY | Age: 79
End: 2019-06-25
Payer: MEDICARE

## 2019-06-25 VITALS
WEIGHT: 194 LBS | HEIGHT: 67 IN | HEART RATE: 79 BPM | BODY MASS INDEX: 30.45 KG/M2 | SYSTOLIC BLOOD PRESSURE: 107 MMHG | DIASTOLIC BLOOD PRESSURE: 76 MMHG

## 2019-06-25 DIAGNOSIS — R25.8 BRADYKINESIA: ICD-10-CM

## 2019-06-25 DIAGNOSIS — D32.9 MENINGIOMA (HCC): ICD-10-CM

## 2019-06-25 DIAGNOSIS — F03.90 DEMENTIA WITHOUT BEHAVIORAL DISTURBANCE, UNSPECIFIED DEMENTIA TYPE: ICD-10-CM

## 2019-06-25 DIAGNOSIS — R27.0 ATAXIA: ICD-10-CM

## 2019-06-25 DIAGNOSIS — G25.9 EXTRAPYRAMIDAL SYNDROME: Primary | ICD-10-CM

## 2019-06-25 PROCEDURE — G8427 DOCREV CUR MEDS BY ELIG CLIN: HCPCS | Performed by: PSYCHIATRY & NEUROLOGY

## 2019-06-25 PROCEDURE — 1123F ACP DISCUSS/DSCN MKR DOCD: CPT | Performed by: PSYCHIATRY & NEUROLOGY

## 2019-06-25 PROCEDURE — 4040F PNEUMOC VAC/ADMIN/RCVD: CPT | Performed by: PSYCHIATRY & NEUROLOGY

## 2019-06-25 PROCEDURE — G8417 CALC BMI ABV UP PARAM F/U: HCPCS | Performed by: PSYCHIATRY & NEUROLOGY

## 2019-06-25 PROCEDURE — 99214 OFFICE O/P EST MOD 30 MIN: CPT | Performed by: PSYCHIATRY & NEUROLOGY

## 2019-06-25 PROCEDURE — G8598 ASA/ANTIPLAT THER USED: HCPCS | Performed by: PSYCHIATRY & NEUROLOGY

## 2019-06-25 PROCEDURE — 1036F TOBACCO NON-USER: CPT | Performed by: PSYCHIATRY & NEUROLOGY

## 2019-06-25 RX ORDER — DONEPEZIL HYDROCHLORIDE 10 MG/1
10 TABLET, FILM COATED ORAL NIGHTLY
Qty: 90 TABLET | Refills: 1 | Status: SHIPPED | OUTPATIENT
Start: 2019-06-25 | End: 2019-12-17 | Stop reason: SDUPTHER

## 2019-06-25 NOTE — PATIENT INSTRUCTIONS
Please call with any questions or concerns:   [facility]  @ [phone]. LAB RESULTS:  Please obtain any labs or diagnostic tests as discussed today. You should call the office to check the results. Please allow  3 to 7 days for us to get these results. MEDICATION LIST:  Please bring an accurate list of your medications to every visit. APPOINTMENT CONFIRMATION:  We will call you the day before your scheduled appointment to confirm. If we are unable to reach you, you MUST call back by the end of the day to confirm the appointment or we may be forced to cancel.

## 2019-06-25 NOTE — PROGRESS NOTES
[facility]   Neurology followup    Subjective:   CC/HP  History was obtained from the patient   Patient has parkinsonian syndrome. He also has poor balance. Patient has had one fall since her last office visit. His tremors have improved. .   Urinary symptoms have improved. The cognitive symptoms are unchanged. No visual hallucinations  Details of his history are as follows: Hershell Broach He has noticed loss of balance. His family has noticed a bland facial expression and his handwriting has become smaller. He has slowness of movement and has difficulty getting up off a low chair or getting out of a car. Patient has had loss of motivation and would rather sit and read or watch TV then get out and do other things. Patient's wife states that he has lost his ability to multitask. For example when he is driving as she could not talk to him to ask a question and his attention would be distracted. REVIEW OF SYSTEMS    Constitutional:  []   Chills   [x]  Fatigue   []  Fevers   []  Malaise   []  Weight loss     [] Denies all of the above    Respiratory:   []  Cough    [x]  Shortness of breath         [] Denies all of the above     Cardiovascular:   []  Chest pain    []  Exertional chest pressure/discomfort           [] Palpitations    []  Syncope     [x] Denies all of the above        Past Medical History:   Diagnosis Date    Allergic conjunctivitis of both eyes     Anemia     macrocytic    Asthmatic bronchitis     recurrent, and pneumonia    BBB (bundle branch block)     Right    Benign essential hypertension     BPH (benign prostatic hyperplasia)     Bradycardia 3/29/2015    hospital x 36 hours. Stopped Verapamil    Colon polyp 2/18/2015    q 3 yr    Colon polyp 03/15/2018    COLONOSCOPY, DR ACUÑA [address] [facility], 3 MM ASCENDING COLON POLYP REMOVED BY COLD BIOPSY FORCEPS, 6 MM TRANSVERSE COLON POLYP REMOVED BY COLD SNARE POLYPECTOMY, 8 MM DESCENDING COLON POLYP REMOVED BY HOT SNARE POLYPECTOMY. REPEAT IN 3 YRS.  Coronary atheroma 2006 or 2007    mild plaques -no clinical CAD wkup  for irreg heart beat - testing neg dr. Kimberly Reddy not seen for yrs    DDD (degenerative disc disease), lumbar     L5 - S1    Diverticulosis of colon 03/15/2018    COLONOSCOPY, [facility],  [address], MILD TO MODERATE LEFT SIDED DIVERTICULOSIS.  Elevated homocysteine ([facility] [address] 12/2010    11.39     Encephalitis 1957    hospitalized 3 weeks.    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- no flare up at this time 8/65/2014    Hearing loss d/t noise     bilateral aids    Impaired fasting glucose     Leukopenia     myelodysplasia/ macrocytic anemia stable 5-6 yrs, fmd monitoring no bone marrow testdon, confirmed with lab work    Low back pain on left side with sciatica 7/74/1905    Metabolic syndrome     Mixed hypertriglyceridemia     Nausea & vomiting     Plantar fasciitis, bilateral 12/1/2012    L>R     Pneumonia     recurrent    Prostatitis 1970s    Tubulovillous adenoma     of colon    Vitamin D deficiency      Family History   Problem Relation Age of Onset    Diabetes Mother     Hypertension Mother     Stroke Mother 80    Breast Cancer Mother     Heart Surgery Mother         heart valve replacement    Dementia Mother 80    Prostate Cancer Father     Cancer Father     Diabetes Maternal Grandmother     Stroke Maternal Grandfather     Stroke Paternal Grandfather     Other Sister         DDD C & T spine    Scoliosis Sister     Liver Disease Sister         hepatitits    Mental Retardation Brother         birth anoxia    Obesity Brother     Seizures Brother     Parkinsonism Brother 72    Sudden Death Maternal Uncle     Coronary Art Dis Maternal Uncle     Heart Attack Maternal Uncle     Other Son         Benign brain tumor     Social History     Socioeconomic History    Marital status:      Spouse name: Judson Rivera Number of children: 2    Years of education: 23    Highest education level: None   Occupational History    Occupation: RETIRED PSYCHOLOGIST 2009   Social Needs    Financial resource strain: Not hard at all   Phillips-Maximo insecurity:     Worry: Never true     Inability: Never true   Prixtel needs:     Medical: No     Non-medical: No   Tobacco Use    Smoking status: Never Smoker    Smokeless tobacco: Never Used   Substance and Sexual Activity    Alcohol use: Yes     Alcohol/week: 3.5 oz     Types: 7 Standard drinks or equivalent per week     Comment: 1 glass of wine qhs.  Drug use: No     Comment: Never tried drugs.  Sexual activity: Never   Lifestyle    Physical activity:     Days per week: None     Minutes per session: None    Stress: None   Relationships    Social connections:     Talks on phone: None     Gets together: None     Attends Yarsani service: None     Active member of club or organization: None     Attends meetings of clubs or organizations: None     Relationship status: None    Intimate partner violence:     Fear of current or ex partner: None     Emotionally abused: None     Physically abused: None     Forced sexual activity: None   Other Topics Concern    None   Social History Narrative    Memory issues 2016. Exercise with Nautilus 20 min 3x/wk &  Walks 20 min 2-3x/wk. 1/10/17.  7/12/17. Walking 3 x/wk for 1/4-1/2 mi, using a nautilus machine 1-2x/wk 20-25 min -plans an elliptical machine. 11/8/17. Can't use the elliptical due to Parkinson's. Joined [employer]. Not there yet. 1/16/18. Goes to 2-3x/wk [employer]. 5/9/18. 11/8/18. 3x/wk 30 min. 3/14/19. 4/1/19. Does bike, machines for arms/leg/core body. 6/6/19. Objective:  Exam:  /76   Pulse 79   Ht 5' 7\" (1.702 m)   Wt 194 lb (88 kg)   BMI 30.38 kg/m²   This is a well-nourished patient in no acute distress  Patient is awake, alert and oriented x3.  Speech is Mildly dysarthric. Poor short-term memory  Pupils are equal round reacting to light. Extraocular movements intact. Face symmetrical. Tongue midline. Motor exam shows normal symmetrical strength. Increased tone. No tremors notedDeep tendon reflexes normal. Plantar reflexes downgoing. Sensory exam normal. Coordination normal. Gait slightly unsteady. No carotid bruit. No neck stiffness. Data :  LABS:  General Labs:    CBC:   Lab Results   Component Value Date    WBC 4.8 11/06/2018    RBC 4.17 11/06/2018    HGB 14.7 11/06/2018    HCT 43.9 11/06/2018    .4 11/06/2018    MCH 35.2 11/06/2018    MCHC 33.4 11/06/2018    RDW 13.3 11/06/2018     11/06/2018    MPV 8.4 11/06/2018     BMP:    Lab Results   Component Value Date     11/06/2018    K 4.3 11/06/2018     11/06/2018    CO2 28 11/06/2018    BUN 16 11/06/2018    LABALBU 4.2 11/06/2018    CREATININE 1.0 11/06/2018    CALCIUM 9.2 11/06/2018    GFRAA >60 11/06/2018    GFRAA 55 04/16/2013    LABGLOM >60 11/06/2018    GLUCOSE 113 11/06/2018    GLUCOSE 107 08/01/2011   TSH was normal  B12 was at the lower end of normal at 338  RADIOLOGY REVIEW:  I have reviewed radiology image(s) and reports(s) of:  MRI brain    Impression :  Extrapyramidal syndrome/parkinsonian syndrome,   Tremor stable   Ataxia stable   mild cognitive impairment, Symptoms about the same  MRI brain showed cortical atrophy  There was an incidental falx 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  Although every effort was made to ensure the

## 2019-07-09 RX ORDER — TRIAMCINOLONE ACETONIDE 1 MG/G
CREAM TOPICAL
Qty: 454 G | Refills: 0 | Status: SHIPPED | OUTPATIENT
Start: 2019-07-09 | End: 2019-10-14 | Stop reason: SDUPTHER

## 2019-07-31 DIAGNOSIS — E78.2 MIXED HYPERTRIGLYCERIDEMIA: Chronic | ICD-10-CM

## 2019-07-31 RX ORDER — SIMVASTATIN 10 MG
TABLET ORAL
Qty: 90 TABLET | Refills: 1 | Status: SHIPPED | OUTPATIENT
Start: 2019-07-31 | End: 2019-11-20 | Stop reason: SDUPTHER

## 2019-09-19 DIAGNOSIS — I10 BENIGN ESSENTIAL HYPERTENSION: Chronic | ICD-10-CM

## 2019-09-19 RX ORDER — HYDROCHLOROTHIAZIDE 12.5 MG/1
CAPSULE, GELATIN COATED ORAL
Qty: 90 CAPSULE | Refills: 0 | Status: SHIPPED | OUTPATIENT
Start: 2019-09-19 | End: 2019-11-20 | Stop reason: SDUPTHER

## 2019-09-19 RX ORDER — LISINOPRIL 40 MG/1
TABLET ORAL
Qty: 90 TABLET | Refills: 0 | Status: SHIPPED | OUTPATIENT
Start: 2019-09-19 | End: 2019-11-20 | Stop reason: SDUPTHER

## 2019-10-14 ENCOUNTER — TELEPHONE (OUTPATIENT)
Dept: FAMILY MEDICINE CLINIC | Age: 79
End: 2019-10-14

## 2019-10-14 DIAGNOSIS — L11.1 GROVER'S DISEASE: Primary | Chronic | ICD-10-CM

## 2019-10-14 RX ORDER — TRIAMCINOLONE ACETONIDE 1 MG/G
CREAM TOPICAL
Qty: 454 G | Refills: 0 | Status: SHIPPED | OUTPATIENT
Start: 2019-10-14 | End: 2019-11-20 | Stop reason: SDUPTHER

## 2019-11-20 ENCOUNTER — OFFICE VISIT (OUTPATIENT)
Dept: FAMILY MEDICINE CLINIC | Age: 79
End: 2019-11-20
Payer: MEDICARE

## 2019-11-20 VITALS
RESPIRATION RATE: 16 BRPM | DIASTOLIC BLOOD PRESSURE: 78 MMHG | HEIGHT: 67 IN | BODY MASS INDEX: 30.61 KG/M2 | OXYGEN SATURATION: 97 % | WEIGHT: 195 LBS | SYSTOLIC BLOOD PRESSURE: 120 MMHG | HEART RATE: 76 BPM

## 2019-11-20 DIAGNOSIS — E78.2 MIXED HYPERTRIGLYCERIDEMIA: Chronic | ICD-10-CM

## 2019-11-20 DIAGNOSIS — J31.0 GUSTATORY RHINITIS: ICD-10-CM

## 2019-11-20 DIAGNOSIS — Z23 NEED FOR INFLUENZA VACCINATION: ICD-10-CM

## 2019-11-20 DIAGNOSIS — L11.1 GROVER'S DISEASE: Chronic | ICD-10-CM

## 2019-11-20 DIAGNOSIS — D64.89 ANEMIA DUE TO OTHER CAUSE, NOT CLASSIFIED: ICD-10-CM

## 2019-11-20 DIAGNOSIS — I10 BENIGN ESSENTIAL HYPERTENSION: Primary | Chronic | ICD-10-CM

## 2019-11-20 DIAGNOSIS — N32.81 OAB (OVERACTIVE BLADDER): ICD-10-CM

## 2019-11-20 DIAGNOSIS — R73.01 IMPAIRED FASTING GLUCOSE: Chronic | ICD-10-CM

## 2019-11-20 DIAGNOSIS — N39.41 URGE INCONTINENCE OF URINE: ICD-10-CM

## 2019-11-20 LAB
CREATININE URINE: 139.7 MG/DL (ref 39–259)
HBA1C MFR BLD: 5.3 %
MICROALBUMIN UR-MCNC: <1.2 MG/DL
MICROALBUMIN/CREAT UR-RTO: NORMAL MG/G (ref 0–30)

## 2019-11-20 PROCEDURE — 90653 IIV ADJUVANT VACCINE IM: CPT | Performed by: FAMILY MEDICINE

## 2019-11-20 PROCEDURE — 99215 OFFICE O/P EST HI 40 MIN: CPT | Performed by: FAMILY MEDICINE

## 2019-11-20 PROCEDURE — 1123F ACP DISCUSS/DSCN MKR DOCD: CPT | Performed by: FAMILY MEDICINE

## 2019-11-20 PROCEDURE — 1036F TOBACCO NON-USER: CPT | Performed by: FAMILY MEDICINE

## 2019-11-20 PROCEDURE — 4040F PNEUMOC VAC/ADMIN/RCVD: CPT | Performed by: FAMILY MEDICINE

## 2019-11-20 PROCEDURE — 83036 HEMOGLOBIN GLYCOSYLATED A1C: CPT | Performed by: FAMILY MEDICINE

## 2019-11-20 PROCEDURE — G8482 FLU IMMUNIZE ORDER/ADMIN: HCPCS | Performed by: FAMILY MEDICINE

## 2019-11-20 PROCEDURE — G0008 ADMIN INFLUENZA VIRUS VAC: HCPCS | Performed by: FAMILY MEDICINE

## 2019-11-20 PROCEDURE — G8427 DOCREV CUR MEDS BY ELIG CLIN: HCPCS | Performed by: FAMILY MEDICINE

## 2019-11-20 PROCEDURE — G8598 ASA/ANTIPLAT THER USED: HCPCS | Performed by: FAMILY MEDICINE

## 2019-11-20 PROCEDURE — G8417 CALC BMI ABV UP PARAM F/U: HCPCS | Performed by: FAMILY MEDICINE

## 2019-11-20 RX ORDER — LISINOPRIL 40 MG/1
TABLET ORAL
Qty: 90 TABLET | Refills: 1 | Status: SHIPPED | OUTPATIENT
Start: 2019-11-20 | End: 2019-12-24

## 2019-11-20 RX ORDER — OXYBUTYNIN CHLORIDE 10 MG/1
10 TABLET, EXTENDED RELEASE ORAL DAILY
Qty: 30 TABLET | Refills: 0 | Status: SHIPPED | OUTPATIENT
Start: 2019-11-20 | End: 2020-07-08 | Stop reason: ALTCHOICE

## 2019-11-20 RX ORDER — SIMVASTATIN 10 MG
TABLET ORAL
Qty: 90 TABLET | Refills: 1 | Status: SHIPPED | OUTPATIENT
Start: 2019-11-20 | End: 2019-12-24

## 2019-11-20 RX ORDER — TRIAMCINOLONE ACETONIDE 1 MG/G
CREAM TOPICAL
Qty: 454 G | Refills: 0 | Status: SHIPPED | OUTPATIENT
Start: 2019-11-20 | End: 2020-06-08

## 2019-11-20 RX ORDER — HYDROCHLOROTHIAZIDE 12.5 MG/1
CAPSULE, GELATIN COATED ORAL
Qty: 90 CAPSULE | Refills: 1 | Status: SHIPPED | OUTPATIENT
Start: 2019-11-20 | End: 2019-12-18 | Stop reason: SDUPTHER

## 2019-11-20 RX ORDER — IPRATROPIUM BROMIDE 42 UG/1
2 SPRAY, METERED NASAL 3 TIMES DAILY PRN
Qty: 3 BOTTLE | Refills: 3 | Status: SHIPPED | OUTPATIENT
Start: 2019-11-20 | End: 2021-09-03 | Stop reason: SDUPTHER

## 2019-11-20 ASSESSMENT — ENCOUNTER SYMPTOMS
CHOKING: 0
SHORTNESS OF BREATH: 0
COUGH: 0
WHEEZING: 0
CHEST TIGHTNESS: 0

## 2019-11-27 ENCOUNTER — HOSPITAL ENCOUNTER (OUTPATIENT)
Age: 79
Discharge: HOME | End: 2019-11-27
Payer: MEDICARE

## 2019-11-27 ENCOUNTER — TELEPHONE (OUTPATIENT)
Dept: FAMILY MEDICINE CLINIC | Age: 79
End: 2019-11-27

## 2019-11-27 DIAGNOSIS — R73.01 IMPAIRED FASTING GLUCOSE: ICD-10-CM

## 2019-11-27 DIAGNOSIS — E78.2 MIXED HYPERTRIGLYCERIDEMIA: Primary | ICD-10-CM

## 2019-11-27 DIAGNOSIS — D64.89 ANEMIA DUE TO OTHER CAUSE, NOT CLASSIFIED: ICD-10-CM

## 2019-11-27 LAB
A/G RATIO: 1.8 (ref 1.1–2.2)
ALBUMIN SERPL-MCNC: 4.1 G/DL (ref 3.4–5)
ALP BLD-CCNC: 60 U/L (ref 40–129)
ALT SERPL-CCNC: 17 U/L (ref 10–40)
ANION GAP SERPL CALCULATED.3IONS-SCNC: 10 MMOL/L (ref 3–16)
AST SERPL-CCNC: 17 U/L (ref 15–37)
BASOPHILS ABSOLUTE: 0 K/UL (ref 0–0.2)
BASOPHILS RELATIVE PERCENT: 0.7 %
BILIRUB SERPL-MCNC: 0.7 MG/DL (ref 0–1)
BUN BLDV-MCNC: 15 MG/DL (ref 7–20)
CALCIUM SERPL-MCNC: 9 MG/DL (ref 8.3–10.6)
CHLORIDE BLD-SCNC: 99 MMOL/L (ref 99–110)
CHOLESTEROL, TOTAL: 148 MG/DL (ref 0–199)
CO2: 29 MMOL/L (ref 21–32)
CREAT SERPL-MCNC: 0.9 MG/DL (ref 0.8–1.3)
EOSINOPHILS ABSOLUTE: 0.2 K/UL (ref 0–0.6)
EOSINOPHILS RELATIVE PERCENT: 4 %
GFR AFRICAN AMERICAN: >60
GFR NON-AFRICAN AMERICAN: >60
GLOBULIN: 2.3 G/DL
GLUCOSE BLD-MCNC: 115 MG/DL (ref 70–99)
HCT VFR BLD CALC: 43.6 % (ref 40.5–52.5)
HDLC SERPL-MCNC: 69 MG/DL (ref 40–60)
HEMOGLOBIN: 14.9 G/DL (ref 13.5–17.5)
LDL CHOLESTEROL CALCULATED: 56 MG/DL
LYMPHOCYTES ABSOLUTE: 1.7 K/UL (ref 1–5.1)
LYMPHOCYTES RELATIVE PERCENT: 36.2 %
MCH RBC QN AUTO: 35.3 PG (ref 26–34)
MCHC RBC AUTO-ENTMCNC: 34.2 G/DL (ref 31–36)
MCV RBC AUTO: 103.4 FL (ref 80–100)
MONOCYTES ABSOLUTE: 0.4 K/UL (ref 0–1.3)
MONOCYTES RELATIVE PERCENT: 7.8 %
NEUTROPHILS ABSOLUTE: 2.4 K/UL (ref 1.7–7.7)
NEUTROPHILS RELATIVE PERCENT: 51.3 %
PDW BLD-RTO: 12.3 % (ref 12.4–15.4)
PLATELET # BLD: 196 K/UL (ref 135–450)
PMV BLD AUTO: 8.3 FL (ref 5–10.5)
POTASSIUM SERPL-SCNC: 4.4 MMOL/L (ref 3.5–5.1)
RBC # BLD: 4.22 M/UL (ref 4.2–5.9)
SODIUM BLD-SCNC: 138 MMOL/L (ref 136–145)
TOTAL PROTEIN: 6.4 G/DL (ref 6.4–8.2)
TRIGL SERPL-MCNC: 117 MG/DL (ref 0–150)
VLDLC SERPL CALC-MCNC: 23 MG/DL
WBC # BLD: 4.6 K/UL (ref 4–11)

## 2019-11-27 PROCEDURE — 80053 COMPREHEN METABOLIC PANEL: CPT

## 2019-11-27 PROCEDURE — 85025 COMPLETE CBC W/AUTO DIFF WBC: CPT

## 2019-11-27 PROCEDURE — 80061 LIPID PANEL: CPT

## 2019-11-27 PROCEDURE — 36415 COLL VENOUS BLD VENIPUNCTURE: CPT

## 2019-12-17 ENCOUNTER — OFFICE VISIT (OUTPATIENT)
Dept: NEUROLOGY | Age: 79
End: 2019-12-17
Payer: MEDICARE

## 2019-12-17 VITALS
HEART RATE: 75 BPM | BODY MASS INDEX: 30.38 KG/M2 | DIASTOLIC BLOOD PRESSURE: 78 MMHG | WEIGHT: 194 LBS | SYSTOLIC BLOOD PRESSURE: 130 MMHG

## 2019-12-17 DIAGNOSIS — R47.1 DYSARTHRIA: ICD-10-CM

## 2019-12-17 DIAGNOSIS — G25.9 EXTRAPYRAMIDAL SYNDROME: Primary | ICD-10-CM

## 2019-12-17 DIAGNOSIS — R25.8 BRADYKINESIA: ICD-10-CM

## 2019-12-17 DIAGNOSIS — F03.90 DEMENTIA WITHOUT BEHAVIORAL DISTURBANCE, UNSPECIFIED DEMENTIA TYPE: ICD-10-CM

## 2019-12-17 DIAGNOSIS — R27.0 ATAXIA: ICD-10-CM

## 2019-12-17 PROCEDURE — G8598 ASA/ANTIPLAT THER USED: HCPCS | Performed by: PSYCHIATRY & NEUROLOGY

## 2019-12-17 PROCEDURE — 1036F TOBACCO NON-USER: CPT | Performed by: PSYCHIATRY & NEUROLOGY

## 2019-12-17 PROCEDURE — G8482 FLU IMMUNIZE ORDER/ADMIN: HCPCS | Performed by: PSYCHIATRY & NEUROLOGY

## 2019-12-17 PROCEDURE — G8417 CALC BMI ABV UP PARAM F/U: HCPCS | Performed by: PSYCHIATRY & NEUROLOGY

## 2019-12-17 PROCEDURE — G8427 DOCREV CUR MEDS BY ELIG CLIN: HCPCS | Performed by: PSYCHIATRY & NEUROLOGY

## 2019-12-17 PROCEDURE — 1123F ACP DISCUSS/DSCN MKR DOCD: CPT | Performed by: PSYCHIATRY & NEUROLOGY

## 2019-12-17 PROCEDURE — 99214 OFFICE O/P EST MOD 30 MIN: CPT | Performed by: PSYCHIATRY & NEUROLOGY

## 2019-12-17 PROCEDURE — 4040F PNEUMOC VAC/ADMIN/RCVD: CPT | Performed by: PSYCHIATRY & NEUROLOGY

## 2019-12-17 RX ORDER — DONEPEZIL HYDROCHLORIDE 10 MG/1
10 TABLET, FILM COATED ORAL NIGHTLY
Qty: 90 TABLET | Refills: 0 | Status: SHIPPED | OUTPATIENT
Start: 2019-12-17 | End: 2020-03-25

## 2019-12-18 RX ORDER — HYDROCHLOROTHIAZIDE 25 MG/1
TABLET ORAL
Qty: 90 TABLET | Refills: 1 | Status: SHIPPED | OUTPATIENT
Start: 2019-12-18 | End: 2020-09-03 | Stop reason: ALTCHOICE

## 2019-12-23 DIAGNOSIS — I10 BENIGN ESSENTIAL HYPERTENSION: Chronic | ICD-10-CM

## 2019-12-23 DIAGNOSIS — E78.2 MIXED HYPERTRIGLYCERIDEMIA: Chronic | ICD-10-CM

## 2019-12-24 RX ORDER — SIMVASTATIN 10 MG
TABLET ORAL
Qty: 90 TABLET | Refills: 1 | Status: SHIPPED | OUTPATIENT
Start: 2019-12-24 | End: 2020-07-20

## 2019-12-24 RX ORDER — LISINOPRIL 40 MG/1
TABLET ORAL
Qty: 90 TABLET | Refills: 1 | Status: SHIPPED | OUTPATIENT
Start: 2019-12-24 | End: 2020-11-30

## 2020-01-14 ENCOUNTER — TELEPHONE (OUTPATIENT)
Dept: NEUROLOGY | Age: 80
End: 2020-01-14

## 2020-01-15 ENCOUNTER — OFFICE VISIT (OUTPATIENT)
Dept: NEUROLOGY | Age: 80
End: 2020-01-15
Payer: MEDICARE

## 2020-01-15 VITALS
BODY MASS INDEX: 30.07 KG/M2 | SYSTOLIC BLOOD PRESSURE: 133 MMHG | HEIGHT: 67 IN | DIASTOLIC BLOOD PRESSURE: 75 MMHG | WEIGHT: 191.6 LBS | HEART RATE: 69 BPM

## 2020-01-15 PROCEDURE — G8482 FLU IMMUNIZE ORDER/ADMIN: HCPCS | Performed by: PSYCHIATRY & NEUROLOGY

## 2020-01-15 PROCEDURE — 4040F PNEUMOC VAC/ADMIN/RCVD: CPT | Performed by: PSYCHIATRY & NEUROLOGY

## 2020-01-15 PROCEDURE — 99214 OFFICE O/P EST MOD 30 MIN: CPT | Performed by: PSYCHIATRY & NEUROLOGY

## 2020-01-15 PROCEDURE — 1036F TOBACCO NON-USER: CPT | Performed by: PSYCHIATRY & NEUROLOGY

## 2020-01-15 PROCEDURE — G8427 DOCREV CUR MEDS BY ELIG CLIN: HCPCS | Performed by: PSYCHIATRY & NEUROLOGY

## 2020-01-15 PROCEDURE — 1123F ACP DISCUSS/DSCN MKR DOCD: CPT | Performed by: PSYCHIATRY & NEUROLOGY

## 2020-01-15 PROCEDURE — G8417 CALC BMI ABV UP PARAM F/U: HCPCS | Performed by: PSYCHIATRY & NEUROLOGY

## 2020-01-15 RX ORDER — MEMANTINE HYDROCHLORIDE 5 MG/1
TABLET ORAL
Qty: 180 TABLET | Refills: 0 | Status: SHIPPED | OUTPATIENT
Start: 2020-01-15 | End: 2020-07-08 | Stop reason: ALTCHOICE

## 2020-01-15 NOTE — PROGRESS NOTES
[facility]   Neurology followup    Subjective:   CC/HP  History was obtained from the patient   Additional history was obtained from his wife. Patient has parkinsonian syndrome. Patient came to the office earlier than his scheduled appointment because of increased symptoms. Patient's wife has noticed that he has increased cognitive symptoms and his memory is worse. He also has poor balance. He has not had any falls. His tremors have improved. .   Urinary symptoms have improved. He has increased drooling. No visual hallucinations  Details of his history are as follows: Boyd Founds He has noticed loss of balance. His family has noticed a bland facial expression and his handwriting has become smaller. He has slowness of movement and has difficulty getting up off a low chair or getting out of a car. Patient has had loss of motivation and would rather sit and read or watch TV then get out and do other things. Patient's wife states that he has lost his ability to multitask. For example when he is driving as she could not talk to him to ask a question and his attention would be distracted. REVIEW OF SYSTEMS    Constitutional:  []   Chills   [x]  Fatigue   []  Fevers   []  Malaise   []  Weight loss     [] Denies all of the above    Respiratory:   []  Cough    [x]  Shortness of breath         [] Denies all of the above     Cardiovascular:   []  Chest pain    []  Exertional chest pressure/discomfort           [] Palpitations    []  Syncope     [x] Denies all of the above        Past Medical History:   Diagnosis Date    Allergic conjunctivitis of both eyes     Anemia     macrocytic    Asthmatic bronchitis     recurrent, and pneumonia    BBB (bundle branch block)     Right    Benign essential hypertension     BPH (benign prostatic hyperplasia)     Bradycardia 3/29/2015    hospital x 36 hours.  Stopped Verapamil    Colon polyp 2/18/2015    q 3 yr    Colon polyp 03/15/2018    COLONOSCOPY, DR Ashley Martin, 3 Death Maternal Uncle     Coronary Art Dis Maternal Uncle     Heart Attack Maternal Uncle     Other Son         Benign brain tumor     Social History     Socioeconomic History    Marital status:      Spouse name: Lourdes Lopez Number of children: 2    Years of education: 23    Highest education level: None   Occupational History    Occupation: RETIRED PSYCHOLOGIST 2009   Social Needs    Financial resource strain: Not hard at all   [employer] insecurity:     Worry: Never true     Inability: Never true   [employer] needs:     Medical: No     Non-medical: No   Tobacco Use    Smoking status: Never Smoker    Smokeless tobacco: Never Used   Substance and Sexual Activity    Alcohol use: Yes     Alcohol/week: 5.8 standard drinks     Types: 7 Standard drinks or equivalent per week     Comment: 1 glass of wine qhs.  Drug use: No     Comment: Never tried drugs.  Sexual activity: Never   Lifestyle    Physical activity:     Days per week: None     Minutes per session: None    Stress: None   Relationships    Social connections:     Talks on phone: None     Gets together: None     Attends Church service: None     Active member of club or organization: None     Attends meetings of clubs or organizations: None     Relationship status: None    Intimate partner violence:     Fear of current or ex partner: None     Emotionally abused: None     Physically abused: None     Forced sexual activity: None   Other Topics Concern    None   Social History Narrative    Memory issues 2016. Exercise with Nautilus 20 min 3x/wk &  Walks 20 min 2-3x/wk. 1/10/17.  7/12/17. Walking 3 x/wk for 1/4-1/2 mi, using a nautilus machine 1-2x/wk 20-25 min -plans an elliptical machine. 11/8/17. Can't use the elliptical due to Parkinson's. Joined Silver [employer]neakers. Not there yet. 1/16/18. Goes to 2-3x/wk silver sneakers. 5/9/18. 11/8/18. 3x/wk 30 min. 3/14/19. 4/1/19. Does bike, machines for arms/leg/core body. 6/6/19.  Goes to gym 3x/wk for 30 min. 11/20/19. Objective:  Exam:  /75   Pulse 69   Ht 5' 7\" (1.702 m)   Wt 191 lb 9.6 oz (86.9 kg)   BMI 30.01 kg/m²   This is a well-nourished patient in no acute distress  Patient is awake, alert and oriented x3. Speech is Mildly dysarthric. Poor short-term memory  Pupils are equal round reacting to light. Extraocular movements intact. Face symmetrical. Tongue midline. Motor exam shows normal symmetrical strength. Increased tone. No tremors notedDeep tendon reflexes normal. Plantar reflexes downgoing. Sensory exam normal. Coordination normal. Gait slightly unsteady. No carotid bruit. No neck stiffness. Data :  LABS:  General Labs:    CBC:   Lab Results   Component Value Date    WBC 4.6 11/27/2019    RBC 4.22 11/27/2019    HGB 14.9 11/27/2019    HCT 43.6 11/27/2019    .4 11/27/2019    MCH 35.3 11/27/2019    MCHC 34.2 11/27/2019    RDW 12.3 11/27/2019     11/27/2019    MPV 8.3 11/27/2019     BMP:    Lab Results   Component Value Date     11/27/2019    K 4.4 11/27/2019    CL 99 11/27/2019    CO2 29 11/27/2019    BUN 15 11/27/2019    LABALBU 4.1 11/27/2019    CREATININE 0.9 11/27/2019    CALCIUM 9.0 11/27/2019    GFRAA >60 11/27/2019    GFRAA 55 04/16/2013    LABGLOM >60 11/27/2019    GLUCOSE 115 11/27/2019    GLUCOSE 107 08/01/2011   TSH was normal  B12 was at the lower end of normal at 338  RADIOLOGY REVIEW:  I have reviewed radiology image(s) and reports(s) of:  MRI brain    Impression :  Extrapyramidal syndrome/parkinsonian syndrome,   Dysarthria initially improved after this increase in Sinemet dose but now is back to his baseline.   Tremor stable   Ataxia stable   Dementia, probable Alzheimer's versus Lewy body dementia  MRI brain showed cortical atrophy  There was an incidental falx meningioma in the left frontal region  B12 level was borderline suspicious for B12 deficiency      Plan :  Discussed with patient   Continue Oral-B 12

## 2020-01-15 NOTE — PATIENT INSTRUCTIONS
Please call with any questions or concerns:   [facility]  @ [phone]. LAB RESULTS:  Please obtain any labs or diagnostic tests as discussed today. You should call the office to check the results. Please allow  3 to 7 days for us to get these results. MEDICATION LIST:  Please bring an accurate list of your medications to every visit. APPOINTMENT CONFIRMATION:  We will call you the day before your scheduled appointment to confirm. If we are unable to reach you, you MUST call back by the end of the day to confirm the appointment or we may be forced to cancel.

## 2020-01-27 NOTE — TELEPHONE ENCOUNTER
LOOKS LIKE AN RX WAS PRINTED IN November FOR A YR SUPPLY- I CALLED [facility] AND THEY DID NOT HAVE ANY REFILLS FOR HIM AND IT WAS LAST FILLED IN AUGUST. CAN WE PLEASE RESEND.

## 2020-03-31 ENCOUNTER — VIRTUAL VISIT (OUTPATIENT)
Dept: NEUROLOGY | Age: 80
End: 2020-03-31
Payer: MEDICARE

## 2020-03-31 VITALS — BODY MASS INDEX: 30.45 KG/M2 | HEIGHT: 67 IN | WEIGHT: 194 LBS

## 2020-03-31 PROCEDURE — G8427 DOCREV CUR MEDS BY ELIG CLIN: HCPCS | Performed by: PSYCHIATRY & NEUROLOGY

## 2020-03-31 PROCEDURE — 4040F PNEUMOC VAC/ADMIN/RCVD: CPT | Performed by: PSYCHIATRY & NEUROLOGY

## 2020-03-31 PROCEDURE — 99214 OFFICE O/P EST MOD 30 MIN: CPT | Performed by: PSYCHIATRY & NEUROLOGY

## 2020-03-31 PROCEDURE — 1123F ACP DISCUSS/DSCN MKR DOCD: CPT | Performed by: PSYCHIATRY & NEUROLOGY

## 2020-03-31 NOTE — PROGRESS NOTES
polyp 03/15/2018    COLONOSCOPY, DR ACUÑA [address] [facility], 3 MM ASCENDING COLON POLYP REMOVED BY COLD BIOPSY FORCEPS, 6 MM TRANSVERSE COLON POLYP REMOVED BY COLD SNARE POLYPECTOMY, 8 MM DESCENDING COLON POLYP REMOVED BY HOT SNARE POLYPECTOMY. REPEAT IN 3 YRS.  Coronary atheroma 2006 or 2007    mild plaques -no clinical CAD wkup  for irreg heart beat - testing neg dr. Viera Shown not seen for yrs    DDD (degenerative disc disease), lumbar     L5 - S1    Diverticulosis of colon 03/15/2018    COLONOSCOPY,  [facility],  [facility], MILD TO MODERATE LEFT SIDED DIVERTICULOSIS.  [facility] 12[facility]2010    11.39     Encephalitis 1957    hospitalized 3 weeks.    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   Claretha Dibbles' corneal dystrophy     GERD (gastroesophageal reflux disease)     [place]'s disease     chronic genetic skin condition-acne on torso- no flare up at this time 8/65/2014    Hearing loss d/t noise     bilateral aids    Impaired fasting glucose     Leukopenia     myelodysplasia/ macrocytic anemia stable 5-6 yrs, fmd monitoring no bone marrow testdon, confirmed with lab work    Low back pain on left side with sciatica 2/64/0213    Metabolic syndrome     Mixed hypertriglyceridemia     Nausea & vomiting     Plantar fasciitis, bilateral 12/1/2012    L>R     Pneumonia     recurrent    Prostatitis 1970s    Tubulovillous adenoma     of colon    Vitamin D deficiency      Family History   Problem Relation Age of Onset    Diabetes Mother     Hypertension Mother     Stroke Mother 80    Breast Cancer Mother     Heart Surgery Mother         heart valve replacement    Dementia Mother 80    Prostate Cancer Father     Cancer Father     Diabetes Maternal Grandmother     Stroke Maternal Grandfather     Stroke Paternal Grandfather     Other Sister         DDD C & T spine    Scoliosis Sister     Liver Disease Sister         hepatitits    Mental Retardation Brother         birth anoxia    Obesity Brother     Seizures Brother     Parkinsonism Brother 72    Sudden Death Maternal Uncle     Coronary Art Dis Maternal Uncle     Heart Attack Maternal Uncle     Other Son         Benign brain tumor     Social History     Socioeconomic History    Marital status:      Spouse name: Nila James Number of children: 2    Years of education: 23    Highest education level: None   Occupational History    Occupation: RETIRED PSYCHOLOGIST 2009   Social Needs    Financial resource strain: Not hard at all   [employer] insecurity     Worry: Never true     Inability: Never true   [employer] needs     Medical: No     Non-medical: No   Tobacco Use    Smoking status: Never Smoker    Smokeless tobacco: Never Used   Substance and Sexual Activity    Alcohol use: Yes     Alcohol/week: 5.8 standard drinks     Types: 7 Standard drinks or equivalent per week     Comment: 1 glass of wine qhs.  Drug use: No     Comment: Never tried drugs.  Sexual activity: Never   Lifestyle    Physical activity     Days per week: None     Minutes per session: None    Stress: None   Relationships    Social connections     Talks on phone: None     Gets together: None     Attends [employer] service: None     Active member of club or organization: None     Attends meetings of clubs or organizations: None     Relationship status: None    Intimate partner violence     Fear of current or ex partner: None     Emotionally abused: None     Physically abused: None     Forced sexual activity: None   Other Topics Concern    None   Social History Narrative    Memory issues 2016. Exercise with Nautilus 20 min 3x/wk &  Walks 20 min 2-3x/wk. 1/10/17.  7/12/17. Walking 3 x/wk for 1/4-1/2 mi, using a nautilus machine 1-2x/wk 20-25 min -plans an elliptical machine. 11/8/17. Can't use the elliptical due to Parkinson's. Joined Silver Sneakers. Not there yet. 1/16/18. Goes to 2-3x/wk silver sneaTamocos. 5/9/18. 11/8/18. 3x/wk 30 min. 3/14/19. 4/1/19.  Does bike,

## 2020-07-02 ENCOUNTER — OFFICE VISIT (OUTPATIENT)
Dept: NEUROLOGY | Age: 80
End: 2020-07-02
Payer: MEDICARE

## 2020-07-02 VITALS
HEART RATE: 68 BPM | HEIGHT: 67 IN | DIASTOLIC BLOOD PRESSURE: 73 MMHG | SYSTOLIC BLOOD PRESSURE: 115 MMHG | BODY MASS INDEX: 30.29 KG/M2 | WEIGHT: 193 LBS

## 2020-07-02 PROCEDURE — G8427 DOCREV CUR MEDS BY ELIG CLIN: HCPCS | Performed by: PSYCHIATRY & NEUROLOGY

## 2020-07-02 PROCEDURE — 99214 OFFICE O/P EST MOD 30 MIN: CPT | Performed by: PSYCHIATRY & NEUROLOGY

## 2020-07-02 PROCEDURE — G8417 CALC BMI ABV UP PARAM F/U: HCPCS | Performed by: PSYCHIATRY & NEUROLOGY

## 2020-07-02 PROCEDURE — 1036F TOBACCO NON-USER: CPT | Performed by: PSYCHIATRY & NEUROLOGY

## 2020-07-02 PROCEDURE — 4040F PNEUMOC VAC/ADMIN/RCVD: CPT | Performed by: PSYCHIATRY & NEUROLOGY

## 2020-07-02 PROCEDURE — 1123F ACP DISCUSS/DSCN MKR DOCD: CPT | Performed by: PSYCHIATRY & NEUROLOGY

## 2020-07-02 RX ORDER — DONEPEZIL HYDROCHLORIDE 10 MG/1
TABLET, FILM COATED ORAL
Qty: 90 TABLET | Refills: 1 | Status: SHIPPED | OUTPATIENT
Start: 2020-07-02 | End: 2020-11-30

## 2020-07-02 NOTE — PATIENT INSTRUCTIONS
Please call with any questions or concerns:   [facility] Neu[facility]  @ [phone]. LAB RESULTS:  Please obtain any labs or diagnostic tests as discussed today. You should call the office to check the results. Please allow  3 to 7 days for us to get these results. MEDICATION LIST:  Please bring an accurate list of your medications to every visit. APPOINTMENT CONFIRMATION:  We will call you the day before your scheduled appointment to confirm. If we are unable to reach you, you MUST call back by the end of the day to confirm the appointment or we may be forced to cancel.

## 2020-07-02 NOTE — PROGRESS NOTES
[facility]   Neurology followup    Subjective:   CC/HP  History was obtained from the patient   Additional history was obtained from his wife. Patient has parkinsonian syndrome. Patient's memory and cognitive symptoms seem to be worse. She has episodic urinary incontinence. He also has poor balance. He has not had any falls. His tremors have improved. .   He has increased drooling. No visual hallucinations  Details of his history are as follows: Suzi Yee He has noticed loss of balance. His family has noticed a bland facial expression and his handwriting has become smaller. He has slowness of movement and has difficulty getting up off a low chair or getting out of a car. Patient has had loss of motivation and would rather sit and read or watch TV then get out and do other things. Patient's wife states that he has lost his ability to multitask. For example when he is driving as she could not talk to him to ask a question and his attention would be distracted. REVIEW OF SYSTEMS    Constitutional:  []   Chills   [x]  Fatigue   []  Fevers   []  Malaise   []  Weight loss     [] Denies all of the above    Respiratory:   []  Cough    []  Shortness of breath         [x] Denies all of the above     Cardiovascular:   []  Chest pain    []  Exertional chest pressure/discomfort           [] Palpitations    []  Syncope     [x] Denies all of the above        Past Medical History:   Diagnosis Date    Allergic conjunctivitis of both eyes     Anemia     macrocytic    Asthmatic bronchitis     recurrent, and pneumonia    BBB (bundle branch block)     Right    Benign essential hypertension     BPH (benign prostatic hyperplasia)     Bradycardia 3/29/2015    hospital x 36 hours.  Stopped Verapamil    Colon polyp 2/18/2015    q 3 yr    Colon polyp 03/15/2018    COLONOSCOPY, DR ACUÑA [address] [facility], 3 MM ASCENDING COLON POLYP REMOVED BY COLD BIOPSY FORCEPS, 6 MM TRANSVERSE COLON POLYP REMOVED BY COLD SNARE POLYPECTOMY, 8 MM DESCENDING COLON POLYP REMOVED BY HOT SNARE POLYPECTOMY. REPEAT IN 3 YRS.  Coronary atheroma 2006 or 2007    mild plaques -no clinical CAD wkup  for irreg heart beat - testing neg dr. Robley Hatchet not seen for yrs    DDD (degenerative disc disease), lumbar     L5 - S1    Diverticulosis of colon 03/15/2018    COLONOSCOPY, [facility],  [address], MILD TO MODERATE LEFT SIDED DIVERTICULOSIS.  Elevated homocysteine ([facility] 12/2010    11.39     Encephalitis 1957    hospitalized 3 weeks.    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   Larwence Mow' corneal dystrophy     GERD (gastroesophageal reflux disease)     Jeremias's disease     chronic genetic skin condition-acne on torso- no flare up at this time 8/65/2014    Hearing loss d/t noise     bilateral aids    Impaired fasting glucose     Leukopenia     myelodysplasia/ macrocytic anemia stable 5-6 yrs, fmd monitoring no bone marrow testdon, confirmed with lab work    Low back pain on left side with sciatica 6/44/6942    Metabolic syndrome     Mixed hypertriglyceridemia     Nausea & vomiting     Plantar fasciitis, bilateral 12/1/2012    L>R     Pneumonia     recurrent    Prostatitis 1970s    Tubulovillous adenoma     of colon    Vitamin D deficiency      Family History   Problem Relation Age of Onset    Diabetes Mother     Hypertension Mother     Stroke Mother 80    Breast Cancer Mother     Heart Surgery Mother         heart valve replacement    Dementia Mother 80    Prostate Cancer Father     Cancer Father     Diabetes Maternal Grandmother     Stroke Maternal Grandfather     Stroke Paternal Grandfather     Other Sister         DDD C & T spine    Scoliosis Sister     Liver Disease Sister         hepatitits    Mental Retardation Brother         birth anoxia    Obesity Brother     Seizures Brother     Parkinsonism Brother 72    Sudden Death Maternal Uncle     Coronary Art Dis Maternal Uncle     Heart Attack Maternal Uncle     Other Son         Benign brain 30.23 kg/m²   This is a well-nourished patient in no acute distress  Patient is awake, alert and oriented x3. Speech is Mildly dysarthric. Poor short-term memory  Pupils are equal round reacting to light. Extraocular movements intact. Face symmetrical. Tongue midline. Motor exam shows normal symmetrical strength. Increased tone. No tremors notedDeep tendon reflexes normal. Plantar reflexes downgoing. Sensory exam normal. Coordination normal. Gait slightly unsteady. No carotid bruit. No neck stiffness. Data :  LABS:  General Labs:    CBC:   Lab Results   Component Value Date    WBC 4.6 11/27/2019    RBC 4.22 11/27/2019    HGB 14.9 11/27/2019    HCT 43.6 11/27/2019    .4 11/27/2019    MCH 35.3 11/27/2019    MCHC 34.2 11/27/2019    RDW 12.3 11/27/2019     11/27/2019    MPV 8.3 11/27/2019     BMP:    Lab Results   Component Value Date     11/27/2019    K 4.4 11/27/2019    CL 99 11/27/2019    CO2 29 11/27/2019    BUN 15 11/27/2019    LABALBU 4.1 11/27/2019    CREATININE 0.9 11/27/2019    CALCIUM 9.0 11/27/2019    GFRAA >60 11/27/2019    GFRAA 55 04/16/2013    LABGLOM >60 11/27/2019    GLUCOSE 115 11/27/2019    GLUCOSE 107 08/01/2011   TSH was normal  B12 was at the lower end of normal at 338  RADIOLOGY REVIEW:  I have reviewed radiology image(s) and reports(s) of:  MRI brain    Impression :  Extrapyramidal syndrome/parkinsonian syndrome,   Dysarthria   Tremor stable   Ataxia stable   Dementia, probable Alzheimer's versus Lewy body dementia  MRI brain showed cortical atrophy  There was an incidental falx meningioma in the left frontal region  B12 level was borderline suspicious for B12 deficiency      Plan :  Discussed with patient   Continue Oral-B 12 supplementation  There is no treatment necessary for the incidental meningioma  Continue Aricept 10 mg at night  Patient has now stopped the Namenda and has not noticed any major changes because of that.   Continue Sinemet 24/100, 2 tablets 4

## 2020-07-08 ENCOUNTER — OFFICE VISIT (OUTPATIENT)
Dept: FAMILY MEDICINE CLINIC | Age: 80
End: 2020-07-08
Payer: MEDICARE

## 2020-07-08 VITALS
HEIGHT: 67 IN | TEMPERATURE: 98.6 F | OXYGEN SATURATION: 97 % | HEART RATE: 75 BPM | DIASTOLIC BLOOD PRESSURE: 74 MMHG | BODY MASS INDEX: 29.03 KG/M2 | RESPIRATION RATE: 18 BRPM | WEIGHT: 185 LBS | SYSTOLIC BLOOD PRESSURE: 122 MMHG

## 2020-07-08 LAB
ANION GAP SERPL CALCULATED.3IONS-SCNC: 11 MMOL/L (ref 3–16)
BILIRUBIN, POC: NEGATIVE
BLOOD URINE, POC: NEGATIVE
BUN BLDV-MCNC: 17 MG/DL (ref 7–20)
CALCIUM SERPL-MCNC: 9.2 MG/DL (ref 8.3–10.6)
CHLORIDE BLD-SCNC: 101 MMOL/L (ref 99–110)
CLARITY, POC: CLEAR
CO2: 29 MMOL/L (ref 21–32)
COLOR, POC: YELLOW
CREAT SERPL-MCNC: 0.9 MG/DL (ref 0.8–1.3)
GFR AFRICAN AMERICAN: >60
GFR NON-AFRICAN AMERICAN: >60
GLUCOSE BLD-MCNC: 94 MG/DL (ref 70–99)
GLUCOSE URINE, POC: NEGATIVE
KETONES, POC: NORMAL
LEUKOCYTE EST, POC: NEGATIVE
NITRITE, POC: NEGATIVE
PH, POC: 6
POTASSIUM SERPL-SCNC: 4.4 MMOL/L (ref 3.5–5.1)
PROSTATE SPECIFIC ANTIGEN: 3.44 NG/ML (ref 0–4)
PROTEIN, POC: NEGATIVE
SODIUM BLD-SCNC: 141 MMOL/L (ref 136–145)
SPECIFIC GRAVITY, POC: >=1.03
UROBILINOGEN, POC: 0.2

## 2020-07-08 PROCEDURE — 1123F ACP DISCUSS/DSCN MKR DOCD: CPT | Performed by: FAMILY MEDICINE

## 2020-07-08 PROCEDURE — G8417 CALC BMI ABV UP PARAM F/U: HCPCS | Performed by: FAMILY MEDICINE

## 2020-07-08 PROCEDURE — 99215 OFFICE O/P EST HI 40 MIN: CPT | Performed by: FAMILY MEDICINE

## 2020-07-08 PROCEDURE — 4040F PNEUMOC VAC/ADMIN/RCVD: CPT | Performed by: FAMILY MEDICINE

## 2020-07-08 PROCEDURE — 81002 URINALYSIS NONAUTO W/O SCOPE: CPT | Performed by: FAMILY MEDICINE

## 2020-07-08 PROCEDURE — G8427 DOCREV CUR MEDS BY ELIG CLIN: HCPCS | Performed by: FAMILY MEDICINE

## 2020-07-08 PROCEDURE — 36415 COLL VENOUS BLD VENIPUNCTURE: CPT | Performed by: FAMILY MEDICINE

## 2020-07-08 PROCEDURE — 1036F TOBACCO NON-USER: CPT | Performed by: FAMILY MEDICINE

## 2020-07-08 ASSESSMENT — PATIENT HEALTH QUESTIONNAIRE - PHQ9
SUM OF ALL RESPONSES TO PHQ QUESTIONS 1-9: 0
SUM OF ALL RESPONSES TO PHQ9 QUESTIONS 1 & 2: 0
1. LITTLE INTEREST OR PLEASURE IN DOING THINGS: 0
2. FEELING DOWN, DEPRESSED OR HOPELESS: 0
SUM OF ALL RESPONSES TO PHQ QUESTIONS 1-9: 0

## 2020-07-08 NOTE — PATIENT INSTRUCTIONS
Michael Soto was seen today for hypertension and incontinence. Diagnoses and all orders for this visit:    Benign essential hypertension  -     Basic Metabolic Panel; Future  Decrease water pill (HCTZ 25 mg) to 1/2 pill every other day. For patients in their late 66's and above in age the systolic blood pressure (top number) goal is 120-140 with occasional increases to 150's not concerning. Call if less than 100 as low blood pressures increase the risk of falls and injuries. Call also if more than 180 once or staying above 160 more than 1/3 of the time. If systolic blood pressure is staying 100-120 we may need to decrease the blood pressure medicine. Call if the lower number (diastolic blood pressure) is more than 100. A much lower bottom number even in the 40's is not usually urgent. Impaired fasting glucose  -     Basic Metabolic Panel; Future  -     Good control.  -     Continue meds and lifestyle control. Mild cognitive impairment  Physical activity helps mental activity. Try \"Sit and Be Fit\" on WCET. Benign prostatic hyperplasia with urinary frequency  -     Basic Metabolic Panel; Future  Omega 3 fatty acids such as are found in fish oil can decrease urinary urgency and frequency. Each capsule of fish oil should have 800 mg or more of a combination of EPA & DHA - the active omega 3's- per capsule and you take 1 capsule 3 times per day. The oil lines the bladder decreasing irritation of the bladder wall and helping prevent bacteria from sticking to the bladder wall so fewer UTI's occur. Fish oil also lowers triglycerides, helps with dry eyes and helps lubricate joints to lower arthritis pain. Dietary folate deficiency anemia  -     Good control.  -     Continue meds and lifestyle control. Screening for prostate cancer  -     Psa screening; Future    Vitamin D deficiency  -     Vitamin D 25 Hydroxy;  Future    Urinary frequency  -     POCT Urinalysis no Micro    COVID 19  The risk is likely to increase for COVID-19 infection as the reopening occurs. This is because while some people are being careful with social distancing masks and social isolation other people are totally ignoring it. Preventing the Spread of Coronavirus Disease 2019 in Homes and Residential Communities   For the most recent information go to [employer]    Prevention steps for People with confirmed or suspected COVID-19 (including persons under investigation) who do not need to be hospitalized  and   People with confirmed COVID-19 who were hospitalized and determined to be medically stable to go home    Your healthcare provider and public health staff will evaluate whether you can be cared for at home. If it is determined that you do not need to be hospitalized and can be isolated at home, you will be monitored by staff from your local or state health department. You should follow the prevention steps below until a healthcare provider or local or state health department says you can return to your normal activities. Stay home except to get medical care  People who are mildly ill with COVID-19 are able to isolate at home during their illness. You should restrict activities outside your home, except for getting medical care. Do not go to work, school, or public areas. Avoid using public transportation, ride-sharing, or taxis. Separate yourself from other people and animals in your home  People: As much as possible, you should stay in a specific room and away from other people in your home. Also, you should use a separate bathroom, if available. Animals: You should restrict contact with pets and other animals while you are sick with COVID-19, just like you would around other people.  Although there have not been reports of pets or other animals becoming sick with COVID-19, it is still recommended that people sick with COVID-19 limit contact with animals until more transmission to others is thought to be low. The decision to discontinue home isolation precautions should be made on a case-by-case basis, in consultation with healthcare providers and state and local health departments. Use Tylenol NOT Ibuprofen/Aleve/aspirin for pain or fevers. There is a theoretical decrease in the body's ability to fight the virus when you are infected if you are on NSAIDs. The FDA has said that this information is not yet proven. The newest evidence suggest that wearing a mask in public may be beneficial if you remember that the outside of it is contaminated and treat it accordingly. It also helps prevent spread if someone has an asymptomatic or presymptomatic case and does not know it yet. Even cloth masks can be beneficial.    Advance Care Planning  People with COVID-19 may have no symptoms, mild symptoms, such as fever, cough, and shortness of breath or they may have more severe illness, developing severe and fatal pneumonia. As a result, Advance Care Planning with attention to naming a health care decision maker (someone you trust to make healthcare decisions for you if you could not speak for yourself) and sharing other health care preferences is important BEFORE a possible health crisis. Please contact your Primary Care Provider to discuss Advance Care Planning. Vitamin D by mouth and vitamin C intravenously are being used as part of the treatment regimen for COVID-19 in some hospitalized patients. Continue vitamin D daily. Also start vitamin C 500 mg  daily. Both of these should be continued for the duration of the moderate risk portion of the COVID-19 pandemic expected to be 1 year. Continue wearing a mask when around people except those living in the same house who are not infected with or heavily exposed to COVID-19, handwashing/hand gel use, social distancing, and social isolation for nonessential activities.     IF you develop ANY respiratory infection symptoms (not just allergy symptoms) suggestive of COVID-19 start the recommendations below: Instructions for Respiratory Infections (SAVE THIS SHEET)    For the first 7-14 days of symptoms follow instructions below, even before being seen in the office or even during treatment with antibiotics, until symptom free. 1. Water: Drink 1 ounce of water for every 2 pounds of body weight for adults, you need 84 Ounces of water/fluids per day. This will loosen mucus in the head and chest & improve the weak feeling of dehydration, allow the body to get germ fighting resources to the infection. Half of fluids can be juice or sugar free Crystal Light. Don't count drinks with caffeine, alcohol or carbonation. Infants can have Pedialyte liquid or freezer pops. Avoid salt and sports drinks if you have high Blood Pressure, swelling in the feet or ankles or have heart problems. 2. Humidity: Humidify the air to 35-50% ( or until the windows fog over slightly). Can use a humidifier, vaporizer, boil water on the stove or put a coffee can full of water on the heater vents. This will loosen mucus from infections and allergies. 3. Sleep: Get 8-10 hours a night and rest during the evening after work or school. If you have trouble sleeping, adults can take Melatonin 5mg up to 2 tabs at bedtime ( not for children or pregnant women). If Mono is suspected then sleep during 9PM to 9AM time span (if possible.)  4. Cough: Take cough medicines with Guaifenesin ( to loosen chest or head congestion) and Dextromethorphan ( to decrease excess cough). Robitussin D.M. Syrup every 4-6 hrs OR Mucinex D. M. pills OR Delsym DM syrup twice a day. Use the pediatric formulations for children over 6 months making sure they are alcohol & sugar free for children, pregnant women, and diabetics. 5. Pain And Fevers: Take Acetaminophen ( Tylenol) for fevers, aches, and headaches. 2-500 mg every 8 hours for adults.  Appropriate doses at bedtime for children may help them sleep better. If pregnant take 1 -500 mg (Tylenol) every 8 hours as needed. Ibuprofen/Aleve/aspirin for pain and fevers SHOULD NOT BE USED IN THE SETTING OF POSSIBLE COVID-19 viral infection NOR if pregnant, if you have acid reflux, high blood pressure, CHF, or kidney problems. 6.Gargle: (DAY ONE OF SYMPTOMS) Gargle in the back of the throat with the head tilted back and to the sides with a strong mouthwash  ( Listerine or Scope) after meals and at bedtime at least 4 -5 times a day. This helps kill bacteria and viruses in the back of the throat and will shorten the duration and decrease the severity of your symptoms: sore throat, cough, ear popping,/ear pain, and possibly dizziness. 7. Smoking: Avoid smoking or exposure to second hand smoke. 8. Zinc: (DAY ONE OF SYMPTOMS)  Zinc lozenges such as Cold Jin (available most stores), or Basic ([employer] brand) will help shorten the duration and lessen symptoms such as sore throat, cough, nasal congestion, runny nose, and post nasal drip. Use 1 lozenge every 2-4 hours ( after meals if stomach is sensitive). Children can use 10-15 mg or less 3-4 times a day or Zinc lollypops. In pregnancy limit to 50-60 mg a day for 7 days as prenatals have Zinc also. With diarrhea use zinc pills 50 mg 1/2 to 1 pill 2x/day. 9. Vitamins: Vitamin C 500 mg with breakfast and dinner (with suspected or diagnosed COVID-19 infection take vitamin C 1000 mg 2-3 times a day). Children and pregnant women should drink citrus juices. This speeds healing and strengthens immune system. 10. Chest Symptoms: Vicks Vapor rub to the chest at bedtime. 11. Decongestants: Avoid all decongestants and antihistamine cold preparations in children. Decongestants should always be avoided in people with high blood pressure, palpitations, heart disease and those on stimulant medications.    Antihistamines may impede the body's ability to fight COVID-19.  12. Frequent hand washing and/or hand gel especially after coughing or sneezing into the hands or blowing the nose to help prevent spreading to others. Use kleenex and NOT handkerchiefs. Try all of the above starting with day 1 of symptoms. If Strep throat symptoms appear call to be seen in the office as soon as possible and don't gargle on that day. Newborns, infants, or anyone with earaches or influenza may need to be seen quickly. Adults with fevers over 103 degrees or shortness of breath should call the office immediately.

## 2020-07-08 NOTE — PROGRESS NOTES
Subjective:      Patient ID: Archie Epps is a 68 y.o. male. Chief Complaint   Patient presents with    Hypertension     pt here for 6 month follow up on hypertension    Incontinence     pt has been having a had time making it to the bathroom   247  HPI    Benign essential hypertension  Range 116-139/78-89. 110-120/low 80s. On Lisinopril and HCTZ 25 mg 1/2 pill. Tries to watch salt. Impaired fasting glucose  Decreased 9 lbs since march. Not eating as much. Parkinsons  Had 5-6 falls last 2 months. Getting into bed was one issue (3x). Also when turning. Benign prostatic hyperplasia with urinary frequency  Has minimal symptoms. Dietary folate deficiency anemia  On folic acid 363 mcg over-the-counter, B12 oral over-the-counter, and multiple vitamin. Dementia /Mild cognitive impairment  Stopped Namenda - not effective. Neuro has said has some Parkinson's sx and some Lewy body sx. Rattling in lungs  Blows nose a lot in the am.     Past Medical History:   Diagnosis Date    Allergic conjunctivitis of both eyes     Anemia     macrocytic    Asthmatic bronchitis     recurrent, and pneumonia    BBB (bundle branch block)     Right    Benign essential hypertension     BPH (benign prostatic hyperplasia)     Bradycardia 3/29/2015    hospital x 36 hours. Stopped Verapamil    Colon polyp 2/18/2015    q 3 yr    Colon polyp 03/15/2018    COLONOSCOPY, DR ACUÑA [facility], 3 MM ASCENDING COLON POLYP REMOVED BY COLD BIOPSY FORCEPS, 6 MM TRANSVERSE COLON POLYP REMOVED BY COLD SNARE POLYPECTOMY, 8 MM DESCENDING COLON POLYP REMOVED BY HOT SNARE POLYPECTOMY. REPEAT IN 3 YRS.  Coronary atheroma 2006 or 2007    mild plaques -no clinical CAD wkup  for irreg heart beat - testing neg dr. Shane Villarreal not seen for yrs    DDD (degenerative disc disease), lumbar     L5 - S1    Diverticulosis of colon 03/15/2018    COLONOSCOPY,  [facility], [facility] [address] [facility], MILD TO MODERATE LEFT SIDED DIVERTICULOSIS.    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-acne on torso- no flare up at this time 8/65/2014    Hearing loss d/t noise     bilateral aids    Impaired fasting glucose     Leukopenia     myelodysplasia/ macrocytic anemia stable 5-6 yrs, fmd monitoring no bone marrow testdon, confirmed with lab work    Low back pain on left side with sciatica 8/49/4121    Metabolic syndrome     Mixed hypertriglyceridemia     Nausea & vomiting     Plantar fasciitis, bilateral 12/1/2012    L>R     Pneumonia     recurrent    Prostatitis 1970s    Tubulovillous adenoma     of colon    Vitamin D deficiency        Past Surgical History:   Procedure Laterality Date    CATARACT REMOVAL WITH IMPLANT  bilateral    COLONOSCOPY  2/18/2015    1 polyp, q 3 years    COLONOSCOPY N/A 03/15/2018    COLONOSCOPY, DR ACUÑA [address] [facility], 3 MM ASCENDING COLON POLYP REMOVED BY COLD BIOPSY FORCEPS, 6 MM TRANSVERSE COLON POLYP REMOVED BY COLD SNARE POLYPECTOMY, 8 MM DESCENDING COLON POLYP REMOVED BY HOT SNARE POLYPECTOMY. MILD TO MODERATE LEFT SIDED DIVERTICULOSIS.   REPEAT IN 3 YRS.    CYSTOSCOPY      EYE SURGERY Right 12/2016    laser to implant lens    EYE SURGERY Left 2015    laser to implant lens    LUMBAR LAMINECTOMY  8-6-2014    microlumbar laminectomy L4 L5    TURP  08/2007    for obstruction    TURP  4/15/2013     & Cystoscopy     WISDOM TOOTH EXTRACTION  1960/1970       Social History     Socioeconomic History    Marital status:      Spouse name: Charmayne Leas Number of children: 2    Years of education: 23    Highest education level: Not on file   Occupational History    Occupation: RETIRED PSYCHOLOGIST 2009   Social Needs    Financial resource strain: Not hard at all   Enzo-Maximo insecurity     Worry: Never true     Inability: Never true  Transportation needs     Medical: No     Non-medical: No   Tobacco Use    Smoking status: Never Smoker    Smokeless tobacco: Never Used   Substance and Sexual Activity    Alcohol use: Yes     Alcohol/week: 5.8 standard drinks     Types: 7 Standard drinks or equivalent per week     Comment: 1 glass of wine qhs.  Drug use: No     Comment: Never tried drugs.  Sexual activity: Never   Lifestyle    Physical activity     Days per week: Not on file     Minutes per session: Not on file    Stress: Not on file   Relationships    Social connections     Talks on phone: Not on file     Gets together: Not on file     Attends Alevism service: Not on file     Active member of club or organization: Not on file     Attends meetings of clubs or organizations: Not on file     Relationship status: Not on file    Intimate partner violence     Fear of current or ex partner: Not on file     Emotionally abused: Not on file     Physically abused: Not on file     Forced sexual activity: Not on file   Other Topics Concern    Not on file   Social History Narrative    Memory issues 2016. Exercise with Nautilus 20 min 3x/wk &  Walks 20 min 2-3x/wk. 1/10/17.  7/12/17. Walking 3 x/wk for 1/4-1/2 mi, using a nautilus machine 1-2x/wk 20-25 min -plans an elliptical machine. 11/8/17. Can't use the elliptical due to Parkinson's. Joined [employer]. Not there yet. 1/16/18. Goes to 2-3x/wk [employer]. 5/9/18. 11/8/18. 3x/wk 30 min. 3/14/19. 4/1/19. Does bike, machines for arms/leg/core body. 6/6/19. Goes to gym 3x/wk for 30 min. 11/20/19. Doing Parkinson's exercises from a video for 10 min. 7/8/20.        Family History   Problem Relation Age of Onset    Diabetes Mother     Hypertension Mother     Stroke Mother 80    Breast Cancer Mother     Heart Surgery Mother         heart valve replacement    Dementia Mother 80    Prostate Cancer Father     Cancer Father     Diabetes Maternal Grandmother     Stroke Maternal Grandfather     Stroke Paternal Grandfather     Other Sister         DDD C & T spine    Scoliosis Sister     Liver Disease Sister         hepatitits    Mental Retardation Brother         birth anoxia    Obesity Brother     Seizures Brother     Parkinsonism Brother 72    Sudden Death Maternal Uncle     Coronary Art Dis Maternal Uncle     Heart Attack Maternal Uncle     Other Son         Benign brain tumor    No Known Problems Daughter        Allergies   Allergen Reactions    Demerol Nausea Only       Current Outpatient Medications   Medication Sig Dispense Refill    donepezil (ARICEPT) 10 MG tablet TAKE 1 TABLET EVERY NIGHT 90 tablet 1    carbidopa-levodopa (SINEMET)  MG per tablet Take 2 tablets by mouth 4 times daily 720 tablet 1    triamcinolone (KENALOG) 0.1 % cream APPLY TOPICALLY TO THE AFFECTED AREA(S) TWICE DAILY AS DIRECTED 454 g 0    mirabegron (MYRBETRIQ) 50 MG TB24 TAKE 1 TABLET EVERY MORNING 90 tablet 3    lisinopril (PRINIVIL;ZESTRIL) 40 MG tablet TAKE 1 TABLET EVERY DAY FOR HIGH BLOOD PRESSURE 90 tablet 1    simvastatin (ZOCOR) 10 MG tablet TAKE 1 TABLET EVERY NIGHT 90 tablet 1    hydrochlorothiazide (HYDRODIURIL) 25 MG tablet TAKE 1 TABLET EVERY DAY 90 tablet 1    ipratropium (ATROVENT) 0.06 % nasal spray 2 sprays by Nasal route 3 times daily as needed for Rhinitis 3 Bottle 3    oxybutynin (DITROPAN-XL) 10 MG extended release tablet Take 1 tablet by mouth daily 30 tablet 0    folic acid (FOLVITE) 544 MCG tablet Take 800 mcg by mouth daily Indications: Increased Amount of Homocysteine in the Blood      clobetasol (TEMOVATE) 0.05 % cream APPLY TOPICALLY TWICE DAILY AS DIRECTED 15 g 2    BIOTIN FORTE PO Take 1 tablet by mouth daily       budesonide-formoterol (SYMBICORT) 160-4.5 MCG/ACT AERO Inhale 2 puffs into the lungs 2 times daily Use spacer. Rinse and spit.  1 Inhaler 1    Cyanocobalamin (VITAMIN B-12 PO) Take 1 tablet by mouth daily       aspirin 81 MG EC tablet Take 81 mg by mouth daily. stopped for 1 week day  Indications: Cardiovascular Risk Reduction      Multiple Vitamin (MULTIVITAMIN PO) Take 1 tablet by mouth daily       Omega-3 Fatty Acids (OMEGA-3 PO) Take 1 tablet by mouth daily       Cholecalciferol (VITAMIN D3) 1000 UNIT CAPS Take  by mouth daily. Stopped for surgery 1 week ago       No current facility-administered medications for this visit. Review of Systems    Objective:   Physical Exam  Vitals signs and nursing note reviewed. Constitutional:       General: He is not in acute distress. Appearance: He is well-developed. He is not diaphoretic. Eyes:      General: No scleral icterus. Right eye: No discharge. Left eye: No discharge. Conjunctiva/sclera: Conjunctivae normal.   Neck:      Musculoskeletal: Neck supple. Thyroid: No thyroid mass or thyromegaly. Vascular: No carotid bruit or JVD. Trachea: Trachea and phonation normal. No tracheal tenderness or tracheal deviation. Cardiovascular:      Rate and Rhythm: Normal rate and regular rhythm. Heart sounds: Normal heart sounds, S1 normal and S2 normal. Heart sounds not distant. No murmur. No friction rub. No gallop. No S3 or S4 sounds. Pulmonary:      Effort: Pulmonary effort is normal. No respiratory distress. Breath sounds: Normal breath sounds. No stridor. No decreased breath sounds, wheezing, rhonchi or rales. Lymphadenopathy:      Head:      Right side of head: No submandibular or tonsillar adenopathy. Left side of head: No submandibular or tonsillar adenopathy. Cervical: No cervical adenopathy. Right cervical: No superficial, deep or posterior cervical adenopathy. Left cervical: No superficial, deep or posterior cervical adenopathy. Skin:     General: Skin is warm and dry. Coloration: Skin is not pale. Neurological:      Mental Status: He is alert.       Motor: Weakness (trouble sitting up straight on his own leans to left) present. Gait: Gait abnormal.      Deep Tendon Reflexes:      Reflex Scores:       Patellar reflexes are 2+ on the right side and 2+ on the left side. Comments: Slow small steps. Using a quad cane. No cogwheeling in biceps. Psychiatric:         Speech: Speech is delayed. Behavior: Behavior normal.         Cognition and Memory: Cognition is impaired. Memory is impaired. He exhibits impaired recent memory. Judgment: Judgment normal.      Comments: Needs repeats of most questions       Vitals:    07/08/20 1421   BP: 122/74   Site: Right Upper Arm   Position: Sitting   Cuff Size: Small Adult   Pulse: 75   Resp: 18   Temp: 98.6 °F (37 °C)   TempSrc: Infrared   SpO2: 97%   Weight: 185 lb (83.9 kg)  Comment: shoes on   Height: 5' 7\" (1.702 m)     BP Readings from Last 3 Encounters:   07/08/20 122/74   07/02/20 115/73   01/15/20 133/75     Pulse Readings from Last 3 Encounters:   07/08/20 75   07/02/20 68   01/15/20 69     Wt Readings from Last 3 Encounters:   07/08/20 185 lb (83.9 kg)   07/02/20 193 lb (87.5 kg)   03/31/20 194 lb (88 kg)     Body mass index is 28.98 kg/m².      11/20/2019 15:16 11/27/2019 10:30   Sodium  138   Potassium  4.4   Chloride  99   CO2  29   BUN  15   Creatinine  0.9   Anion Gap  10   GFR Non-African American  >60   Glucose  115 (H)   Calcium  9.0   Total Protein  6.4   Cholesterol, Total  148   HDL Cholesterol  69 (H)   LDL Calculated  56   Triglycerides  117   VLDL Cholesterol Calculated  23   Albumin  4.1   Globulin  2.3   Albumin/Globulin Ratio  1.8   Alk Phos  60   ALT  17   AST  17   Bilirubin  0.7   WBC  4.6   RBC  4.22   Hemoglobin Quant  14.9   Hematocrit  43.6   MCV  103.4 (H)   MCH  35.3 (H)   MCHC  34.2   MPV  8.3   RDW  12.3 (L)   Platelet Count  399   Neutrophils %  51.3   Lymphocyte %  36.2   Monocytes %  7.8   Eosinophils %  4.0   Basophils %  0.7   Neutrophils Absolute  2.4   Lymphocytes Absolute  1.7   Monocytes Absolute  0.4 Eosinophils Absolute  0.2   Basophils Absolute  0.0   Creatinine, Ur 139.7    Microalbumin Creatinine Ratio Below calculation    Microalbumin, Random Urine <1.20      Assessment:      1. Benign essential hypertension    2. Impaired fasting glucose    3. Mixed hypertriglyceridemia    4. Mild cognitive impairment    5. Benign prostatic hyperplasia with urinary frequency    6. Dietary folate deficiency anemia    7. Screening for prostate cancer    8. Vitamin D deficiency    9. Urinary frequency          Plan:    332  - Counseling More Than 50% of the 40 min Appointment Time     Neyda Caicedo was seen today for hypertension and incontinence. Diagnoses and all orders for this visit:    Benign essential hypertension  -     Basic Metabolic Panel; Future  Decrease water pill (HCTZ 25 mg) to 1/2 pill every other day. For patients in their late 66's and above in age the systolic blood pressure (top number) goal is 120-140 with occasional increases to 150's not concerning. Call if less than 100 as low blood pressures increase the risk of falls and injuries. Call also if more than 180 once or staying above 160 more than 1/3 of the time. If systolic blood pressure is staying 100-120 we may need to decrease the blood pressure medicine. Call if the lower number (diastolic blood pressure) is more than 100. A much lower bottom number even in the 40's is not usually urgent. Impaired fasting glucose  -     Basic Metabolic Panel; Future  -     Good control.  -     Continue meds and lifestyle control. Mild cognitive impairment  Physical activity helps mental activity. Try \"Sit and Be Fit\" on WCET. Benign prostatic hyperplasia with urinary frequency  -     Basic Metabolic Panel; Future  Omega 3 fatty acids such as are found in fish oil can decrease urinary urgency and frequency.  Each capsule of fish oil should have 800 mg or more of a combination of EPA & DHA - the active omega 3's- per capsule and you take 1 capsule 3 times per day. The oil lines the bladder decreasing irritation of the bladder wall and helping prevent bacteria from sticking to the bladder wall so fewer UTI's occur. Fish oil also lowers triglycerides, helps with dry eyes and helps lubricate joints to lower arthritis pain. Dietary folate deficiency anemia  -     Good control.  -     Continue meds and lifestyle control. Screening for prostate cancer  -     Psa screening; Future    Vitamin D deficiency  -     Vitamin D 25 Hydroxy; Future    Urinary frequency  -     POCT Urinalysis no Micro    COVID 19  The risk is likely to increase for COVID-19 infection as the reopening occurs. This is because while some people are being careful with social distancing masks and social isolation other people are totally ignoring it. Preventing the Spread of Coronavirus Disease 2019 in Homes and Residential Communities   For the most recent information go to [email or web address]    Prevention steps for People with confirmed or suspected COVID-19 (including persons under investigation) who do not need to be hospitalized  and   People with confirmed COVID-19 who were hospitalized and determined to be medically stable to go home    Your healthcare provider and public health staff will evaluate whether you can be cared for at home. If it is determined that you do not need to be hospitalized and can be isolated at home, you will be monitored by staff from your local or state health department. You should follow the prevention steps below until a healthcare provider or local or state health department says you can return to your normal activities. Stay home except to get medical care  People who are mildly ill with COVID-19 are able to isolate at home during their illness. You should restrict activities outside your home, except for getting medical care. Do not go to work, school, or public areas.  Avoid using public transportation, ride-sharing, or taxis. Separate yourself from other people and animals in your home  People: As much as possible, you should stay in a specific room and away from other people in your home. Also, you should use a separate bathroom, if available. Animals: You should restrict contact with pets and other animals while you are sick with COVID-19, just like you would around other people. Although there have not been reports of pets or other animals becoming sick with COVID-19, it is still recommended that people sick with COVID-19 limit contact with animals until more information is known about the virus. When possible, have another member of your household care for your animals while you are sick. If you are sick with COVID-19, avoid contact with your pet, including petting, snuggling, being kissed or licked, and sharing food. If you must care for your pet or be around animals while you are sick, wash your hands before and after you interact with pets and wear a facemask. Call ahead before visiting your doctor  If you have a medical appointment, call the healthcare provider and tell them that you have or may have COVID-19. This will help the healthcare providers office take steps to keep other people from getting infected or exposed. Wear a facemask  You should wear a facemask when you are around other people (e.g., sharing a room or vehicle) or pets and before you enter a healthcare providers office. If you are not able to wear a facemask (for example, because it causes trouble breathing), then people who live with you should not stay in the same room with you, or they should wear a facemask if they enter your room. Cover your coughs and sneezes  Cover your mouth and nose with a tissue when you cough or sneeze. Throw used tissues in a lined trash can.  Immediately wash your hands with soap and water for at least 20 seconds or, if soap and water are not available, clean your hands with an alcohol-based hand  that contains at least 60% alcohol. Clean your hands often  Wash your hands often with soap and water for at least 20 seconds, especially after blowing your nose, coughing, or sneezing; going to the bathroom; and before eating or preparing food. If soap and water are not readily available, use an alcohol-based hand  with at least 60% alcohol, covering all surfaces of your hands and rubbing them together until they feel dry. Soap and water are the best option if hands are visibly dirty. Avoid touching your eyes, nose, and mouth with unwashed hands. Avoid sharing personal household items  You should not share dishes, drinking glasses, cups, eating utensils, towels, or bedding with other people or pets in your home. After using these items, they should be washed thoroughly with soap and water. Clean all high-touch surfaces everyday  High touch surfaces include counters, tabletops, doorknobs, bathroom fixtures, toilets, phones, keyboards, tablets, and bedside tables. Also, clean any surfaces that may have blood, stool, or body fluids on them. Use a household cleaning spray or wipe, according to the label instructions. Labels contain instructions for safe and effective use of the cleaning product including precautions you should take when applying the product, such as wearing gloves and making sure you have good ventilation during use of the product. Monitor your symptoms  Seek prompt medical attention if your illness is worsening (e.g., difficulty breathing). Before seeking care, call your healthcare provider and tell them that you have, or are being evaluated for, COVID-19. Put on a facemask before you enter the facility. These steps will help the healthcare providers office to keep other people in the office or waiting room from getting infected or exposed. Ask your healthcare provider to call the local or state health department.  Persons who are placed under active monitoring or facilitated self-monitoring should follow instructions provided by their local health department or occupational health professionals, as appropriate. When working with your local health department check their available hours. If you have a medical emergency and need to call 911, notify the dispatch personnel that you have, or are being evaluated for COVID-19. If possible, put on a facemask before emergency medical services arrive. Discontinuing home isolation  Patients with confirmed COVID-19 should remain under home isolation precautions until the risk of secondary transmission to others is thought to be low. The decision to discontinue home isolation precautions should be made on a case-by-case basis, in consultation with healthcare providers and state and local health departments. Use Tylenol NOT Ibuprofen/Aleve/aspirin for pain or fevers. There is a theoretical decrease in the body's ability to fight the virus when you are infected if you are on NSAIDs. The FDA has said that this information is not yet proven. The newest evidence suggest that wearing a mask in public may be beneficial if you remember that the outside of it is contaminated and treat it accordingly. It also helps prevent spread if someone has an asymptomatic or presymptomatic case and does not know it yet. Even cloth masks can be beneficial.    Advance Care Planning  People with COVID-19 may have no symptoms, mild symptoms, such as fever, cough, and shortness of breath or they may have more severe illness, developing severe and fatal pneumonia. As a result, Advance Care Planning with attention to naming a health care decision maker (someone you trust to make healthcare decisions for you if you could not speak for yourself) and sharing other health care preferences is important BEFORE a possible health crisis. Please contact your Primary Care Provider to discuss Advance Care Planning.     Vitamin D by mouth and vitamin C intravenously are being used as part of the treatment regimen for COVID-19 in some hospitalized patients. Continue vitamin D daily. Also start vitamin C 500 mg  daily. Both of these should be continued for the duration of the moderate risk portion of the COVID-19 pandemic expected to be 1 year. Continue wearing a mask when around people except those living in the same house who are not infected with or heavily exposed to COVID-19, handwashing/hand gel use, social distancing, and social isolation for nonessential activities. IF you develop ANY respiratory infection symptoms (not just allergy symptoms) suggestive of COVID-19 start the recommendations below: Instructions for Respiratory Infections (SAVE THIS SHEET)    For the first 7-14 days of symptoms follow instructions below, even before being seen in the office or even during treatment with antibiotics, until symptom free. 1. Water: Drink 1 ounce of water for every 2 pounds of body weight for adults, you need 84 Ounces of water/fluids per day. This will loosen mucus in the head and chest & improve the weak feeling of dehydration, allow the body to get germ fighting resources to the infection. Half of fluids can be juice or sugar free Crystal Light. Don't count drinks with caffeine, alcohol or carbonation. Infants can have Pedialyte liquid or freezer pops. Avoid salt and sports drinks if you have high Blood Pressure, swelling in the feet or ankles or have heart problems. 2. Humidity: Humidify the air to 35-50% ( or until the windows fog over slightly). Can use a humidifier, vaporizer, boil water on the stove or put a coffee can full of water on the heater vents. This will loosen mucus from infections and allergies. 3. Sleep: Get 8-10 hours a night and rest during the evening after work or school.  If you have trouble sleeping, adults can take Melatonin 5mg up to 2 tabs at bedtime ( not for children or pregnant women). If Mono is suspected then sleep during 9PM to 9AM time span (if possible.)  4. Cough: Take cough medicines with Guaifenesin ( to loosen chest or head congestion) and Dextromethorphan ( to decrease excess cough). Robitussin D.M. Syrup every 4-6 hrs OR Mucinex D. M. pills OR Delsym DM syrup twice a day. Use the pediatric formulations for children over 6 months making sure they are alcohol & sugar free for children, pregnant women, and diabetics. 5. Pain And Fevers: Take Acetaminophen ( Tylenol) for fevers, aches, and headaches. 2-500 mg every 8 hours for adults. Appropriate doses at bedtime for children may help them sleep better. If pregnant take 1 -500 mg (Tylenol) every 8 hours as needed. Ibuprofen/Aleve/aspirin for pain and fevers SHOULD NOT BE USED IN THE SETTING OF POSSIBLE COVID-19 viral infection NOR if pregnant, if you have acid reflux, high blood pressure, CHF, or kidney problems. 6.Gargle: (DAY ONE OF SYMPTOMS) Gargle in the back of the throat with the head tilted back and to the sides with a strong mouthwash  ( Listerine or Scope) after meals and at bedtime at least 4 -5 times a day. This helps kill bacteria and viruses in the back of the throat and will shorten the duration and decrease the severity of your symptoms: sore throat, cough, ear popping,/ear pain, and possibly dizziness. 7. Smoking: Avoid smoking or exposure to second hand smoke. 8. Zinc: (DAY ONE OF SYMPTOMS)  Zinc lozenges such as Cold Jin (available most stores), or Basic ([employer] brand) will help shorten the duration and lessen symptoms such as sore throat, cough, nasal congestion, runny nose, and post nasal drip. Use 1 lozenge every 2-4 hours ( after meals if stomach is sensitive). Children can use 10-15 mg or less 3-4 times a day or Zinc lollypops. In pregnancy limit to 50-60 mg a day for 7 days as prenatals have Zinc also. With diarrhea use zinc pills 50 mg 1/2 to 1 pill 2x/day.   9. Vitamins: Vitamin C 500 mg with breakfast and dinner (with suspected or diagnosed COVID-19 infection take vitamin C 1000 mg 2-3 times a day). Children and pregnant women should drink citrus juices. This speeds healing and strengthens immune system. 10. Chest Symptoms: Vicks Vapor rub to the chest at bedtime. 11. Decongestants: Avoid all decongestants and antihistamine cold preparations in children. Decongestants should always be avoided in people with high blood pressure, palpitations, heart disease and those on stimulant medications. Antihistamines may impede the body's ability to fight COVID-19.  12. Frequent hand washing and/or hand gel especially after coughing or sneezing into the hands or blowing the nose to help prevent spreading to others. Use kleenex and NOT handkerchiefs. Try all of the above starting with day 1 of symptoms. If Strep throat symptoms appear call to be seen in the office as soon as possible and don't gargle on that day. Newborns, infants, or anyone with earaches or influenza may need to be seen quickly. Adults with fevers over 103 degrees or shortness of breath should call the office immediately.       Ammon Puga, DO

## 2020-07-09 LAB — VITAMIN D 25-HYDROXY: 61.4 NG/ML

## 2020-07-20 RX ORDER — SIMVASTATIN 10 MG
TABLET ORAL
Qty: 90 TABLET | Refills: 1 | Status: SHIPPED | OUTPATIENT
Start: 2020-07-20 | End: 2021-01-26 | Stop reason: SDUPTHER

## 2020-09-03 ENCOUNTER — OFFICE VISIT (OUTPATIENT)
Dept: FAMILY MEDICINE CLINIC | Age: 80
End: 2020-09-03
Payer: MEDICARE

## 2020-09-03 VITALS
DIASTOLIC BLOOD PRESSURE: 74 MMHG | BODY MASS INDEX: 31.16 KG/M2 | SYSTOLIC BLOOD PRESSURE: 116 MMHG | HEIGHT: 65 IN | OXYGEN SATURATION: 99 % | HEART RATE: 78 BPM | RESPIRATION RATE: 12 BRPM | TEMPERATURE: 97.1 F | WEIGHT: 187 LBS

## 2020-09-03 LAB
BILIRUBIN, POC: ABNORMAL
BLOOD URINE, POC: ABNORMAL
CLARITY, POC: CLEAR
COLOR, POC: YELLOW
GLUCOSE URINE, POC: ABNORMAL
KETONES, POC: ABNORMAL
LEUKOCYTE EST, POC: ABNORMAL
NITRITE, POC: ABNORMAL
PH, POC: 6
PROTEIN, POC: ABNORMAL
SPECIFIC GRAVITY, POC: 1.03
UROBILINOGEN, POC: 0.2

## 2020-09-03 PROCEDURE — 1123F ACP DISCUSS/DSCN MKR DOCD: CPT | Performed by: FAMILY MEDICINE

## 2020-09-03 PROCEDURE — 81002 URINALYSIS NONAUTO W/O SCOPE: CPT | Performed by: FAMILY MEDICINE

## 2020-09-03 PROCEDURE — G8417 CALC BMI ABV UP PARAM F/U: HCPCS | Performed by: FAMILY MEDICINE

## 2020-09-03 PROCEDURE — 4040F PNEUMOC VAC/ADMIN/RCVD: CPT | Performed by: FAMILY MEDICINE

## 2020-09-03 PROCEDURE — 99214 OFFICE O/P EST MOD 30 MIN: CPT | Performed by: FAMILY MEDICINE

## 2020-09-03 PROCEDURE — 1036F TOBACCO NON-USER: CPT | Performed by: FAMILY MEDICINE

## 2020-09-03 PROCEDURE — G8427 DOCREV CUR MEDS BY ELIG CLIN: HCPCS | Performed by: FAMILY MEDICINE
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
## 2020-09-03 NOTE — PATIENT INSTRUCTIONS
Kareem Webb was seen today for other. Diagnoses and all orders for this visit:    Urinary incontinence, unspecified type  -     POCT Urinalysis no Micro  -     Culture, Urine  Stop the Myrbetriq 50 mg. Continue the fish oil. Continue good water intake. If no UTI we will try Tamsulosin 0.4 mg nightly. If no help with above see the urologist.  ? Need for the TURP? Benign essential hypertension  Continue to monitor BP. Bring cuff to next visit.

## 2020-09-04 LAB — URINE CULTURE, ROUTINE: NORMAL

## 2020-09-09 ENCOUNTER — TELEPHONE (OUTPATIENT)
Dept: FAMILY MEDICINE CLINIC | Age: 80
End: 2020-09-09

## 2020-09-09 NOTE — TELEPHONE ENCOUNTER
Patient is still having urinary issues. Called and has appointment with Dr Kemi Watkins with the Urology group on 9/15.  Per Insurance will require a referral

## 2020-10-23 ENCOUNTER — TELEPHONE (OUTPATIENT)
Dept: FAMILY MEDICINE CLINIC | Age: 80
End: 2020-10-23

## 2020-10-23 NOTE — TELEPHONE ENCOUNTER
PLACED REFERRAL IN BIN TO BE FAXED BY FRONT OFFICE STAFF. CALLED AND SPOKE TO PATIENT WIFE TO LET HER KNOW THIS WAS BEING DONE.  [place]

## 2020-10-29 RX ORDER — TRIAMCINOLONE ACETONIDE 1 MG/G
CREAM TOPICAL
Qty: 454 G | Refills: 0 | Status: SHIPPED | OUTPATIENT
Start: 2020-10-29 | End: 2021-03-18

## 2020-11-30 RX ORDER — DONEPEZIL HYDROCHLORIDE 10 MG/1
TABLET, FILM COATED ORAL
Qty: 90 TABLET | Refills: 0 | Status: SHIPPED | OUTPATIENT
Start: 2020-11-30 | End: 2021-02-09 | Stop reason: SDUPTHER

## 2020-11-30 RX ORDER — LISINOPRIL 40 MG/1
TABLET ORAL
Qty: 90 TABLET | Refills: 0 | Status: SHIPPED | OUTPATIENT
Start: 2020-11-30 | End: 2021-03-25

## 2021-01-08 ENCOUNTER — TELEPHONE (OUTPATIENT)
Dept: FAMILY MEDICINE CLINIC | Age: 81
End: 2021-01-08

## 2021-01-08 DIAGNOSIS — R29.6 RECURRENT FALLS: ICD-10-CM

## 2021-01-08 DIAGNOSIS — G31.84 MILD COGNITIVE IMPAIRMENT: Chronic | ICD-10-CM

## 2021-01-08 DIAGNOSIS — R53.1 GENERALIZED WEAKNESS: Primary | ICD-10-CM

## 2021-01-08 DIAGNOSIS — G25.9 EXTRAPYRAMIDAL SYNDROME: Chronic | ICD-10-CM

## 2021-01-08 NOTE — TELEPHONE ENCOUNTER
Patient wife is calling because [address] suggested that her  start on Speech Therapy & Physical Therapy, she needs us to do a referral and send to fax number - [phone]. They also need a demographic sheet. Please give her a call back.

## 2021-01-09 NOTE — TELEPHONE ENCOUNTER
Please print and fax orders to [facility] for physical and speech therapy with demographics sheet and patient summary from miscellaneous. Notify patient's spouse that this was done.

## 2021-01-11 ENCOUNTER — TELEPHONE (OUTPATIENT)
Dept: FAMILY MEDICINE CLINIC | Age: 81
End: 2021-01-11

## 2021-01-11 NOTE — TELEPHONE ENCOUNTER
I SPOKE TO Bridgette Pete 1620, [facility] NO PAPERWORK. I ASKED HER TO EMAIL THEM TO ME SINCE SHE HAS TRIED TO FAX TWICE. I DID GET THEM, I WILL HAVE DR ONEIL SIGN THEM IN DR RIVERA'S ABSENCE.   [address] [facility]

## 2021-01-11 NOTE — TELEPHONE ENCOUNTER
[facility] said the faxed a form to be signed by Dr Tamera Francisco on 12/28. They still have not gotten it back.  Please advise

## 2021-01-26 ENCOUNTER — VIRTUAL VISIT (OUTPATIENT)
Dept: FAMILY MEDICINE CLINIC | Age: 81
End: 2021-01-26
Payer: MEDICARE

## 2021-01-26 ENCOUNTER — TELEPHONE (OUTPATIENT)
Dept: FAMILY MEDICINE CLINIC | Age: 81
End: 2021-01-26

## 2021-01-26 DIAGNOSIS — R79.89 ELEVATED HOMOCYSTEINE: Chronic | ICD-10-CM

## 2021-01-26 DIAGNOSIS — E78.2 MIXED HYPERTRIGLYCERIDEMIA: Chronic | ICD-10-CM

## 2021-01-26 DIAGNOSIS — R53.1 GENERALIZED WEAKNESS: ICD-10-CM

## 2021-01-26 DIAGNOSIS — N39.42 URINARY INCONTINENCE WITHOUT SENSORY AWARENESS: ICD-10-CM

## 2021-01-26 DIAGNOSIS — G25.9 EXTRAPYRAMIDAL SYNDROME: ICD-10-CM

## 2021-01-26 DIAGNOSIS — D52.0 DIETARY FOLATE DEFICIENCY ANEMIA: Chronic | ICD-10-CM

## 2021-01-26 DIAGNOSIS — I10 BENIGN ESSENTIAL HYPERTENSION: Primary | Chronic | ICD-10-CM

## 2021-01-26 DIAGNOSIS — Z71.89 EDUCATED ABOUT COVID-19 VIRUS INFECTION: ICD-10-CM

## 2021-01-26 DIAGNOSIS — R73.01 IMPAIRED FASTING GLUCOSE: Chronic | ICD-10-CM

## 2021-01-26 DIAGNOSIS — G31.84 MCI (MILD COGNITIVE IMPAIRMENT) WITH MEMORY LOSS: ICD-10-CM

## 2021-01-26 PROCEDURE — 99214 OFFICE O/P EST MOD 30 MIN: CPT | Performed by: FAMILY MEDICINE

## 2021-01-26 PROCEDURE — 4040F PNEUMOC VAC/ADMIN/RCVD: CPT | Performed by: FAMILY MEDICINE

## 2021-01-26 PROCEDURE — 1123F ACP DISCUSS/DSCN MKR DOCD: CPT | Performed by: FAMILY MEDICINE

## 2021-01-26 PROCEDURE — G8427 DOCREV CUR MEDS BY ELIG CLIN: HCPCS | Performed by: FAMILY MEDICINE

## 2021-01-26 RX ORDER — TAMSULOSIN HYDROCHLORIDE 0.4 MG/1
0.4 CAPSULE ORAL DAILY
COMMUNITY
Start: 2020-09-01 | End: 2021-12-28 | Stop reason: ALTCHOICE

## 2021-01-26 RX ORDER — SIMVASTATIN 10 MG
TABLET ORAL
Qty: 90 TABLET | Refills: 3 | Status: SHIPPED | OUTPATIENT
Start: 2021-01-26 | End: 2021-12-28

## 2021-01-26 SDOH — HEALTH STABILITY: MENTAL HEALTH: HOW MANY STANDARD DRINKS CONTAINING ALCOHOL DO YOU HAVE ON A TYPICAL DAY?: NOT ASKED

## 2021-01-26 ASSESSMENT — PATIENT HEALTH QUESTIONNAIRE - PHQ9
SUM OF ALL RESPONSES TO PHQ9 QUESTIONS 1 & 2: 0
SUM OF ALL RESPONSES TO PHQ QUESTIONS 1-9: 0
SUM OF ALL RESPONSES TO PHQ QUESTIONS 1-9: 0

## 2021-01-26 NOTE — PROGRESS NOTES
Onesimo Brumfield (:  1940) is a 80 y.o. male,Established patient, here for evaluation of the following chief complaint(s): Hypertension (ROUTINE HTN FOLLOW UP ) and Hyperlipidemia (ROUTINE CHOLESTEROL FOLLOW UP- HE WILL GO TO OUTPT LAB FOR HIS BLOODWORK- LIPID AND CMP PENDED PLEASE PEND ANYTHING ELSE AS LAB COLLECT  )    ASSESSMENT/PLAN:  212    Patient Instructions     Quincy Conway was seen today for hypertension and hyperlipidemia. Diagnoses and all orders for this visit:    Benign essential hypertension  -     Comprehensive Metabolic Panel; Future  -     MICROALBUMIN / CREATININE URINE RATIO; Future  -     Good control. Bring blood pressure cuff and blood pressure to next visit so we can compare our cuff and your cuff. -     Continue meds and lifestyle control. Generalized weakness  Work on exercises throughout the day. Continue physical therapy. Review exercises below with physical therapy for feasibility as part of his daily routine. Mixed hypertriglyceridemia  -     Comprehensive Metabolic Panel; Future  -     Lipid Panel; Future  -     simvastatin (ZOCOR) 10 MG tablet; TAKE 1 TABLET EVERY NIGHT  -     HOMOCYSTEINE, SERUM; Future  Good control. Continue medication and lifestyle control. Get fasting labs approximately 2021    Impaired fasting glucose  -     Comprehensive Metabolic Panel; Future  -     HEMOGLOBIN A1C; Future  Sugars are likely to be lowered with weight loss. Urinary incontinence without sensory awareness - related to BPH  -     Comprehensive Metabolic Panel; Future  We have tried everything but fish oil. All that visual does is decreased bladder irritability. If you wish to try fish oil:  Omega 3 fatty acids such as are found in fish oil can decrease urinary urgency and frequency. Each capsule of fish oil should have 800 mg or more of a combination of EPA & DHA - the active omega 3's- per capsule and you take 1 capsule 3 times per day.  The oil lines the bladder flexible, strong, and steady on your feet. Start each exercise slowly. Ease off the exercise if you start to have pain. How to do the exercises  Prayer stretch   1. Start with your palms together in front of your chest, just below your chin. 2. Slowly lower your hands toward your waistline, keeping your hands close to your stomach and your palms together until you feel a mild to moderate stretch under your forearms. 3. Hold for at least 15 to 30 seconds. Repeat 2 to 4 times. Shoulder stretch   1.  a doorway, and place one arm against the door frame. Your elbow should be a little higher than your shoulder. 2. Relax your shoulders as you lean forward, allowing your chest and shoulder muscles to stretch. You can also turn your body slightly away from your arm to stretch the muscles even more. 3. Hold for 15 to 30 seconds. 4. Repeat 2 to 4 times with each arm. Calf stretch   1. Place your hands on a wall for balance. You can also do this with your hands on the back of a chair or countertop. 2. Step back with your right leg. Keep the leg straight, and press your right heel into the floor. 3. Press your hips forward, bending your left leg slightly. You will feel the stretch in your right calf. 4. Hold the stretch 15 to 30 seconds. 5. Repeat 2 to 4 times with each leg. Hip flexor stretch (edge of table)   1. Lie flat on your back on a table or flat bench, with your knees and lower legs hanging off the edge of the table. 2. Grab one leg at the knee, and pull that knee back toward your chest. Relax the other leg. Let it hang down toward the floor until you feel a stretch in the upper thigh of that leg and hip. 3. Hold the stretch for at least 15 to 30 seconds. 4. Repeat 2 to 4 times for each leg. Back stretches   1. Get down on your hands and knees on the floor. 2. Relax your head, and allow it to droop.  Round your back up toward the ceiling until you feel a nice stretch in your upper, middle, and lower back. Hold this stretch for as long as it feels comfortable, or about 15 to 30 seconds. 3. Return to the starting position with a flat back while you are on your hands and knees. 4. Let your back sway by pressing your stomach toward the floor. Lift your buttocks toward the ceiling. 5. Hold this position for 15 to 30 seconds. 6. Repeat 2 to 4 times. Midback stretch   If you have knee pain, do not do this exercise. 1. Kneel on the floor, and sit back on your ankles. 2. Lean forward, place your hands on the floor, and stretch your arms out in front of you. Rest your head between your arms. 3. Gently push your chest toward the floor, reaching as far in front of you as you can. 4. Hold for 15 to 30 seconds. 5. Repeat 2 to 4 times. Press-up stretch   1. Lie on your stomach, supporting your body with your forearms. 2. Press your elbows down into the floor to raise your upper back. As you do this, relax your stomach muscles and allow your back to arch without using your back muscles. As you press up, do not let your hips or pelvis come off the floor. 3. Hold for 15 to 30 seconds, then relax. 4. Repeat 2 to 4 times. Chest and shoulder stretches   1. Put a few towels on top of one another and roll them together lengthwise. 2. Lie down, and place the roll of towels along the bones of your spine from your neck to your tailbone. Or lie on a foam roller if you have one. 3. Make sure that your head and tailbone area are supported with the roll of towels or on the foam roller. Be sure the towel roll or foam roller is in line with your spine. 4. Bend your knees to support your lower back. 5. Hold this position while you move your arms into the following positions:  6. Hold each arm position for 15 to 30 seconds. 7. Repeat the entire cycle of arm movements 2 to 4 times. 1. Arms down at your sides, with the palms facing up. 2. Arms out to your sides in a \"T\" shape.   3. Arms out to your sides with your elbows bent to 90 degrees, as in a \"goalpost\" shape. 4. Arms stretched over your head. Single knee-to-chest stretch   1. Lie on your back with your knees bent and your feet flat on the floor. You can put a small pillow under your head and neck if it is more comfortable. 2. Bring one knee to your chest, keeping the other foot flat on the floor. 3. Keep your lower back pressed to the floor. Hold for 15 to 30 seconds. 4. Relax, and lower the knee to the starting position. 5. Repeat with the other leg. Repeat 2 to 4 times with each leg. 6. To get more stretch, keep your other leg flat on the floor while pulling your knee to your chest.    Hip rotator stretch   1. Lie on your back with both knees bent and your feet flat on the floor. 2. Put the ankle of one leg on your opposite thigh near your knee. 3. Use your hand to gently push the raised knee away from your body until you feel a gentle stretch around your hip. 4. Hold the stretch for 15 to 30 seconds. 5. Repeat 2 to 4 times with each leg. Hamstring wall stretch   1. Lie on your back in a doorway, with your lower legs through the open door. 2. Slide the leg next to the doorway up the wall to straighten your knee. You should feel a gentle stretch down the back of your leg. 3. Hold the stretch for at least 1 minute to start. As you get used to it, work toward holding the stretch for as long as 6 minutes. 4. Do this exercise 2 to 4 times with one leg. Then move to the other side of the doorway to stretch the other leg. 1. Do not arch your back. 2. Do not bend either knee. 3. Keep one heel touching the floor and the other heel touching the wall. Do not point your toes. Hand flips for coordination   1. While seated, place your forearm and wrist on your thigh, palm down. 2. Flip your hand over so the back of your hand rests on your thigh and your palm is up.  Alternate between palm up and palm down while keeping your forearm on your thigh.  3. Repeat 8 to 12 times with each hand. Finger opposition for coordination   1. With one hand, point your fingers and thumb straight up. Your wrist should be relaxed, following the line of your fingers and thumb. 2. Touch your thumb to each finger, one finger at a time. This will look like an \"okay\" sign, but try to keep your other fingers straight and pointing upward as much as you can. 3. Repeat 8 to 12 times with each hand. Finger extension for coordination   1. Place your hand flat on a table. 2. Lift and then lower one finger at a time off the table. 3. Repeat 8 to 12 times with each hand. Shoulder blade squeeze for strength   1. Stand with your arms at your sides, and squeeze your shoulder blades together. Do not raise your shoulders up as you squeeze. 2. Hold 6 seconds. 3. Repeat 8 to 12 times. Alternate arm and leg (bird dog) balance   Do this exercise slowly. Try to keep your body straight at all times. 1. Start on the floor, on your hands and knees. 2. Tighten your belly muscles by pulling your belly button in toward your spine. Be sure you continue to breathe normally and do not hold your breath. 3. Raise one arm off the floor, and hold it straight out in front of you. Be careful not to let your shoulder drop down, because that will twist your trunk. 4. Hold for about 6 seconds, then lower your arm and switch to your other arm. 5. Repeat 8 to 12 times with each arm. 6. When you can do this exercise with ease and no pain, try it with one leg raised off the floor. Hold your leg straight out behind you. Be careful not to let your hip drop down, because that will twist your trunk. 7. When holding your leg straight out becomes easier, try raising your opposite arm at the same time. Repeat steps 1 through 5. Balance-building exercise 1   1. Stand with a chair in front of you and a wall behind you, in case you lose your balance.   2. Stand with your feet together and your arms at your sides. 3. Move your head up and down 10 times. Balance-building exercise 2   1. Turn your head side to side 10 times. Balance-building exercise 3   1. Move your head diagonally up and down 10 times. Balance-building exercise 4   1. Move your head diagonally up and down 10 times on the other side. Follow-up care is a key part of your treatment and safety. Be sure to make and go to all appointments, and call your doctor if you are having problems. It's also a good idea to know your test results and keep a list of the medicines you take. Where can you learn more? Go to [email or web address] and sign in to your [employer] account. Enter Q531 in the [employer] box to learn more about \"Movement Disorders: Exercises. \"     If you do not have an account, please click on the \"Sign Up Now\" link. Current as of: November 20, 2019               Content Version: 12.6  © 9013-0789 [employer]. Care instructions adapted under license by [facility] ([facility]). If you have questions about a medical condition or this instruction, always ask your healthcare professional. [place] [address] any warranty or liability for your use of this information. Return in 6 months (on 7/26/2021) for Hypertension, Prediabetes, Cholesterol, homocystinemia, generalized weakness. SUBJECTIVE/OBJECTIVE:  Chief Complaint   Patient presents with    Hypertension     ROUTINE HTN FOLLOW UP     Hyperlipidemia     ROUTINE CHOLESTEROL FOLLOW UP- HE WILL GO TO OUTPT LAB FOR HIS BLOODWORK- LIPID AND CMP PENDED PLEASE PEND ANYTHING ELSE AS LAB COLLECT     137  HPI    Benign essential hypertension  Average 130's/80's. Is watching salt. Salty snacks but not often. No side effects of blood pressure medicine. Mixed hypertriglyceridemia  Is still watching diet. No Side effects with cholesterol medicine  Impaired fasting glucose  Lost weight. Definitely not gaining.   Watching  Hearing loss d/t noise     bilateral aids    Impaired fasting glucose     Leukopenia     myelodysplasia/ macrocytic anemia stable 5-6 yrs, fmd monitoring no bone marrow testdon, confirmed with lab work    Low back pain on left side with sciatica 0/96/7446    Metabolic syndrome     Mixed hypertriglyceridemia     Nausea & vomiting     Plantar fasciitis, bilateral 12/1/2012    L>R     Pneumonia     recurrent    Prostatitis 1970s    Tubulovillous adenoma     of colon    Urinary incontinence without sensory awareness - related to BPH     Vitamin D deficiency        Past Surgical History:   Procedure Laterality Date    CATARACT REMOVAL WITH IMPLANT  bilateral    COLONOSCOPY  2/18/2015    1 polyp, q 3 years    COLONOSCOPY N/A 03/15/2018    COLONOSCOPY, DR ACUÑA [address] [facility], 3 MM ASCENDING COLON POLYP REMOVED BY COLD BIOPSY FORCEPS, 6 MM TRANSVERSE COLON POLYP REMOVED BY COLD SNARE POLYPECTOMY, 8 MM DESCENDING COLON POLYP REMOVED BY HOT SNARE POLYPECTOMY. MILD TO MODERATE LEFT SIDED DIVERTICULOSIS. REPEAT IN 3 YRS.    CYSTOSCOPY      EYE SURGERY Right 12/2016    laser to implant lens    EYE SURGERY Left 2015    laser to implant lens    LUMBAR LAMINECTOMY  8-6-2014    microlumbar laminectomy L4 L5    TURP  08/2007    for obstruction    TURP  4/15/2013     & Cystoscopy     WISDOM TOOTH EXTRACTION  1960/1970       Social History     Socioeconomic History    Marital status:      Spouse name: Willie Veloz Number of children: 2    Years of education: 23    Highest education level: Not on file   Occupational History    Occupation: RETIRED PSYCHOLOGIST 2009   Social Needs    Financial resource strain: Not hard at all   Enzo[employer] insecurity     Worry: Never true     Inability: Never true   [employer] needs     Medical: No     Non-medical: No   Tobacco Use    Smoking status: Never Smoker    Smokeless tobacco: Never Used   Substance and Sexual Activity    Alcohol use:  Yes     Alcohol/week: 5.8 standard drinks     Types: 7 Standard drinks or equivalent per week     Comment: 1 glass of wine qhs. Has to have at 8 pm or trouble walking.  Drug use: No     Comment: Never tried drugs.  Sexual activity: Never   Lifestyle    Physical activity     Days per week: Not on file     Minutes per session: Not on file    Stress: Not on file   Relationships    Social connections     Talks on phone: Not on file     Gets together: Not on file     Attends Yazidi service: Not on file     Active member of club or organization: Not on file     Attends meetings of clubs or organizations: Not on file     Relationship status: Not on file    Intimate partner violence     Fear of current or ex partner: Not on file     Emotionally abused: Not on file     Physically abused: Not on file     Forced sexual activity: Not on file   Other Topics Concern    Not on file   Social History Narrative    Memory issues 2016. Exercise with Nautilus 20 min 3x/wk &  Walks 20 min 2-3x/wk. 1/10/17.  7/12/17. Walking 3 x/wk for 1/4-1/2 mi, using a nautilus machine 1-2x/wk 20-25 min -plans an elliptical machine. 11/8/17. Can't use the elliptical due to Parkinson's. Joined [employer]. Not there yet. 1/16/18. Goes to 2-3x/wk silver sneakers. 5/9/18. 11/8/18. 3x/wk 30 min. 3/14/19. 4/1/19. Does bike, machines for arms/leg/core body. 6/6/19. Goes to gym 3x/wk for 30 min. 11/20/19. Doing Parkinson's exercises from a video for 10 min. 7/8/20. Walks mostly to go to the bathroom with 4 prong cane. Walks around the house. Walks outside. Was doing [employer] and [employer] Fit. 9/3/20. Getting physical therapy at the house. Using a walker. Also getting speech therapy. Has hard time getting up from a chair. 1/26/21.         Family History   Problem Relation Age of Onset    Diabetes Mother     Hypertension Mother     Stroke Mother 80    Breast Cancer Mother     Heart Surgery Mother         heart valve replacement    Dementia Mother 80    Prostate Cancer Father     Cancer Father     Diabetes Maternal Grandmother     Stroke Maternal Grandfather     Stroke Paternal Grandfather     Other Sister         DDD C & T spine    Scoliosis Sister     Liver Disease Sister         hepatitits    Mental Retardation Brother         birth anoxia    Obesity Brother     Seizures Brother     Parkinsonism Brother 72    Sudden Death Maternal Uncle     Coronary Art Dis Maternal Uncle     Heart Attack Maternal Uncle     Other Son         Benign brain tumor    No Known Problems Daughter        Allergies   Allergen Reactions    Demerol Nausea Only       Current Outpatient Medications   Medication Sig Dispense Refill    tamsulosin (FLOMAX) 0.4 MG capsule Take 0.4 mg by mouth daily Indications: Enlarged Prostate with Urination Problems, urinary urgency and incontinence (uses Depends)       simvastatin (ZOCOR) 10 MG tablet TAKE 1 TABLET EVERY NIGHT 90 tablet 3    lisinopril (PRINIVIL;ZESTRIL) 40 MG tablet TAKE 1 TABLET EVERY DAY FOR HIGH BLOOD PRESSURE 90 tablet 0    donepezil (ARICEPT) 10 MG tablet TAKE 1 TABLET EVERY NIGHT 90 tablet 0    triamcinolone (KENALOG) 0.1 % cream APPLY TOPICALLY TO THE AFFECTED AREA(S) TWICE DAILY AS DIRECTED 454 g 0    carbidopa-levodopa (SINEMET)  MG per tablet Take 2 tablets by mouth 4 times daily 720 tablet 1    mirabegron (MYRBETRIQ) 50 MG TB24 TAKE 1 TABLET EVERY MORNING 90 tablet 3    ipratropium (ATROVENT) 0.06 % nasal spray 2 sprays by Nasal route 3 times daily as needed for Rhinitis 3 Bottle 3    folic acid (FOLVITE) 363 MCG tablet Take 800 mcg by mouth daily Indications: Increased Amount of Homocysteine in the Blood      clobetasol (TEMOVATE) 0.05 % cream APPLY TOPICALLY TWICE DAILY AS DIRECTED 15 g 2    Cyanocobalamin (VITAMIN B-12 PO) Take 1 tablet by mouth daily       aspirin 81 MG EC tablet Take 81 mg by mouth daily Indications: Cardiovascular Risk Reduction       Omega-3 Fatty Acids (OMEGA-3 PO) Take 1 tablet by mouth 2 times daily       Cholecalciferol (VITAMIN D3) 1000 UNIT CAPS Take  by mouth daily. Stopped for surgery 1 week ago      budesonide-formoterol (SYMBICORT) 160-4.5 MCG/ACT AERO Inhale 2 puffs into the lungs 2 times daily Use spacer. Rinse and spit. (Patient not taking: Reported on 1/26/2021) 1 Inhaler 1     No current facility-administered medications for this visit. Patient-Reported Vitals 1/26/2021   Patient-Reported Systolic 386   Patient-Reported Diastolic 85   BP by PT there today. BP Readings from Last 3 Encounters:   09/03/20 116/74   07/08/20 122/74   07/02/20 115/73     Pulse Readings from Last 3 Encounters:   09/03/20 78   07/08/20 75   07/02/20 68     Wt Readings from Last 3 Encounters:   09/03/20 187 lb (84.8 kg)   07/08/20 185 lb (83.9 kg)   07/02/20 193 lb (87.5 kg)       Physical Exam    [INSTRUCTIONS:  \"[x]\" Indicates a positive item  \"[]\" Indicates a negative item  -- DELETE ALL ITEMS NOT EXAMINED]    Constitutional: [x] Appears well-developed and well-nourished [x] No apparent distress      [] Abnormal -     Mental status: [x] Alert and awake  [x] Oriented to person/place/time [x] Able to follow commands    [] Abnormal -     Eyes:   EOM    [x]  Normal    [] Abnormal -   Sclera  [x]  Normal    [] Abnormal -          Discharge [x]  None visible   [] Abnormal -     HENT: [x] Normocephalic, atraumatic  [] Abnormal -   [x] Mouth/Throat: Mucous membranes are moist    External Ears [x] Normal  [] Abnormal -    Neck: [x] No visualized mass [] Abnormal -   No lumps bumps or tenderness to patient's wife's patient bilateral.    Pulmonary/Chest: [x] Respiratory effort normal   [x] No visualized signs of difficulty breathing or respiratory distress. No urge to cough with deep breaths.         [] Abnormal -      Musculoskeletal:   [] Normal gait with no signs of ataxia         [x] Normal range of motion of neck        [] Abnormal -     Neurological:        [x] No Facial Asymmetry (Cranial nerve 7 motor function) (limited exam due to video visit)          [x] No gaze palsy        [x] Abnormal -   parkinsonian facies. Skin:        [x] No significant exanthematous lesions or discoloration noted on facial skin         [] Abnormal -            Psychiatric:       [x] Normal Affect [] Abnormal -        [x] No Hallucinations    Other pertinent observable physical exam findings:-      On this date 01/27/21 I have spent 35 minutes reviewing previous notes, test results and face to face (virtual) with the patient discussing the diagnosis and importance of compliance with the treatment plan as well as documenting on the day of the visit. Deidra Pride is a 80 y.o. male being evaluated by a Virtual Visit (video visit) encounter to address concerns as mentioned above. A caregiver was present when appropriate. Due to this being a TeleHealth encounter (During TITUB-36 public health emergency), evaluation of the following organ systems was limited: Vitals/Constitutional/EENT/Resp/CV/GI//MS/Neuro/Skin/Heme-Lymph-Imm. Pursuant to the emergency declaration under the [address] [place] [address] and the [employer] Act, this Virtual Visit was conducted with patient's (and/or legal guardian's) consent, to reduce the patient's risk of exposure to COVID-19 and provide necessary medical care. The patient (and/or legal guardian) has also been advised to contact this office for worsening conditions or problems, and seek emergency medical treatment and/or call 911 if deemed necessary. Patient identification was verified at the start of the visit: Yes    Services were provided through a video synchronous discussion virtually to substitute for in-person clinic visit. Patient was located at home and provider was located in office or at home. An electronic signature was used to authenticate this note.     --[place] Jose Vargas

## 2021-01-26 NOTE — TELEPHONE ENCOUNTER
Wellington Nguyen from [facility] would like a copy faxed to him   [phone]  From todays visit. He has opened up the pt for physical therapy and speech therapy.

## 2021-01-27 PROBLEM — R53.1 GENERALIZED WEAKNESS: Status: ACTIVE | Noted: 2021-01-27

## 2021-01-27 NOTE — TELEPHONE ENCOUNTER
[facility] needs a copy of Milton's visit from 1/26/2021. Can you let me know when it is signed so that I can fax it.  Thanks

## 2021-01-28 ENCOUNTER — TELEPHONE (OUTPATIENT)
Dept: FAMILY MEDICINE CLINIC | Age: 81
End: 2021-01-28

## 2021-01-28 DIAGNOSIS — G25.9 EXTRAPYRAMIDAL SYNDROME: Chronic | ICD-10-CM

## 2021-01-28 DIAGNOSIS — I10 BENIGN ESSENTIAL HYPERTENSION: Chronic | ICD-10-CM

## 2021-01-28 DIAGNOSIS — R53.1 GENERALIZED WEAKNESS: ICD-10-CM

## 2021-01-28 DIAGNOSIS — R29.6 RECURRENT FALLS: ICD-10-CM

## 2021-01-28 DIAGNOSIS — G31.84 MILD COGNITIVE IMPAIRMENT: Primary | Chronic | ICD-10-CM

## 2021-01-28 NOTE — PATIENT INSTRUCTIONS
Luna Perez was seen today for hypertension and hyperlipidemia. Diagnoses and all orders for this visit:    Benign essential hypertension  -     Comprehensive Metabolic Panel; Future  -     MICROALBUMIN / CREATININE URINE RATIO; Future  -     Good control. Bring blood pressure cuff and blood pressure to next visit so we can compare our cuff and your cuff. -     Continue meds and lifestyle control. Generalized weakness  Work on exercises throughout the day. Continue physical therapy. Review exercises below with physical therapy for feasibility as part of his daily routine. Mixed hypertriglyceridemia  -     Comprehensive Metabolic Panel; Future  -     Lipid Panel; Future  -     simvastatin (ZOCOR) 10 MG tablet; TAKE 1 TABLET EVERY NIGHT  -     HOMOCYSTEINE, SERUM; Future  Good control. Continue medication and lifestyle control. Get fasting labs approximately 1/29/2021    Impaired fasting glucose  -     Comprehensive Metabolic Panel; Future  -     HEMOGLOBIN A1C; Future  Sugars are likely to be lowered with weight loss. Urinary incontinence without sensory awareness - related to BPH  -     Comprehensive Metabolic Panel; Future  We have tried everything but fish oil. All that visual does is decreased bladder irritability. If you wish to try fish oil:  Omega 3 fatty acids such as are found in fish oil can decrease urinary urgency and frequency. Each capsule of fish oil should have 800 mg or more of a combination of EPA & DHA - the active omega 3's- per capsule and you take 1 capsule 3 times per day. The oil lines the bladder decreasing irritation of the bladder wall and helping prevent bacteria from sticking to the bladder wall so fewer UTI's occur. Fish oil also lowers triglycerides, helps with dry eyes and helps lubricate joints to lower arthritis pain. Elevated homocysteine (HCC)  -     HOMOCYSTEINE, SERUM; Future  -     Good control.  -     Continue meds and lifestyle control.      Dietary folate deficiency anemia  -     CBC WITH AUTO DIFFERENTIAL; Future  Anemia is well controlled. Red blood cells are still large. Continue folic acid. Educated about COVID-19 virus infection  CALL ON DAY 1 if you think you have POSSIBLE symptoms of COVID-19. Nutrients that support the immune system:  Beta carotene/vitamin A  Vitamin C  Vitamin D  Zinc  Proteins  Probiotics/prebiotic's(prebiotics are fiber sources that support the growth of probiotics)  Water from all sources including foods such as fruit: Women 2.7 L / 91 ounces per day AND men 3.7 L/125 ounces per day  Source Glover Romberg [employer] (award winning nutritionist/registered dietitian, author, ) 3/25/2020     Movement Disorders: Exercises   Introduction (any exercise shown done on the floor can be done on the bed)  Here are some examples of exercises for problems with movement. Your doctor or physical therapist will tell you when you can start these exercises and which ones will work best for you. Problems with movement can happen along with many conditions, such as multiple sclerosis, Parkinson's disease, or damage from a stroke. No matter what is making movement hard for you, these exercises can help you be more flexible, strong, and steady on your feet. Start each exercise slowly. Ease off the exercise if you start to have pain. How to do the exercises  Prayer stretch   1. Start with your palms together in front of your chest, just below your chin. 2. Slowly lower your hands toward your waistline, keeping your hands close to your stomach and your palms together until you feel a mild to moderate stretch under your forearms. 3. Hold for at least 15 to 30 seconds. Repeat 2 to 4 times. Shoulder stretch   1.  a doorway, and place one arm against the door frame. Your elbow should be a little higher than your shoulder. 2. Relax your shoulders as you lean forward, allowing your chest and shoulder muscles to stretch.  You can also turn your body slightly away from your arm to stretch the muscles even more. 3. Hold for 15 to 30 seconds. 4. Repeat 2 to 4 times with each arm. Calf stretch   1. Place your hands on a wall for balance. You can also do this with your hands on the back of a chair or countertop. 2. Step back with your right leg. Keep the leg straight, and press your right heel into the floor. 3. Press your hips forward, bending your left leg slightly. You will feel the stretch in your right calf. 4. Hold the stretch 15 to 30 seconds. 5. Repeat 2 to 4 times with each leg. Hip flexor stretch (edge of table)   1. Lie flat on your back on a table or flat bench, with your knees and lower legs hanging off the edge of the table. 2. Grab one leg at the knee, and pull that knee back toward your chest. Relax the other leg. Let it hang down toward the floor until you feel a stretch in the upper thigh of that leg and hip. 3. Hold the stretch for at least 15 to 30 seconds. 4. Repeat 2 to 4 times for each leg. Back stretches   1. Get down on your hands and knees on the floor. 2. Relax your head, and allow it to droop. Round your back up toward the ceiling until you feel a nice stretch in your upper, middle, and lower back. Hold this stretch for as long as it feels comfortable, or about 15 to 30 seconds. 3. Return to the starting position with a flat back while you are on your hands and knees. 4. Let your back sway by pressing your stomach toward the floor. Lift your buttocks toward the ceiling. 5. Hold this position for 15 to 30 seconds. 6. Repeat 2 to 4 times. Midback stretch   If you have knee pain, do not do this exercise. 1. Kneel on the floor, and sit back on your ankles. 2. Lean forward, place your hands on the floor, and stretch your arms out in front of you. Rest your head between your arms. 3. Gently push your chest toward the floor, reaching as far in front of you as you can. 4. Hold for 15 to 30 seconds.   5. Repeat 2 to 4 times. Press-up stretch   1. Lie on your stomach, supporting your body with your forearms. 2. Press your elbows down into the floor to raise your upper back. As you do this, relax your stomach muscles and allow your back to arch without using your back muscles. As you press up, do not let your hips or pelvis come off the floor. 3. Hold for 15 to 30 seconds, then relax. 4. Repeat 2 to 4 times. Chest and shoulder stretches   1. Put a few towels on top of one another and roll them together lengthwise. 2. Lie down, and place the roll of towels along the bones of your spine from your neck to your tailbone. Or lie on a foam roller if you have one. 3. Make sure that your head and tailbone area are supported with the roll of towels or on the foam roller. Be sure the towel roll or foam roller is in line with your spine. 4. Bend your knees to support your lower back. 5. Hold this position while you move your arms into the following positions:  6. Hold each arm position for 15 to 30 seconds. 7. Repeat the entire cycle of arm movements 2 to 4 times. 1. Arms down at your sides, with the palms facing up. 2. Arms out to your sides in a \"T\" shape. 3. Arms out to your sides with your elbows bent to 90 degrees, as in a \"goalpost\" shape. 4. Arms stretched over your head. Single knee-to-chest stretch   1. Lie on your back with your knees bent and your feet flat on the floor. You can put a small pillow under your head and neck if it is more comfortable. 2. Bring one knee to your chest, keeping the other foot flat on the floor. 3. Keep your lower back pressed to the floor. Hold for 15 to 30 seconds. 4. Relax, and lower the knee to the starting position. 5. Repeat with the other leg. Repeat 2 to 4 times with each leg. 6. To get more stretch, keep your other leg flat on the floor while pulling your knee to your chest.    Hip rotator stretch   1.  Lie on your back with both knees bent and your feet flat on the floor.  2. Put the ankle of one leg on your opposite thigh near your knee. 3. Use your hand to gently push the raised knee away from your body until you feel a gentle stretch around your hip. 4. Hold the stretch for 15 to 30 seconds. 5. Repeat 2 to 4 times with each leg. Hamstring wall stretch   1. Lie on your back in a doorway, with your lower legs through the open door. 2. Slide the leg next to the doorway up the wall to straighten your knee. You should feel a gentle stretch down the back of your leg. 3. Hold the stretch for at least 1 minute to start. As you get used to it, work toward holding the stretch for as long as 6 minutes. 4. Do this exercise 2 to 4 times with one leg. Then move to the other side of the doorway to stretch the other leg. 1. Do not arch your back. 2. Do not bend either knee. 3. Keep one heel touching the floor and the other heel touching the wall. Do not point your toes. Hand flips for coordination   1. While seated, place your forearm and wrist on your thigh, palm down. 2. Flip your hand over so the back of your hand rests on your thigh and your palm is up. Alternate between palm up and palm down while keeping your forearm on your thigh. 3. Repeat 8 to 12 times with each hand. Finger opposition for coordination   1. With one hand, point your fingers and thumb straight up. Your wrist should be relaxed, following the line of your fingers and thumb. 2. Touch your thumb to each finger, one finger at a time. This will look like an \"okay\" sign, but try to keep your other fingers straight and pointing upward as much as you can. 3. Repeat 8 to 12 times with each hand. Finger extension for coordination   1. Place your hand flat on a table. 2. Lift and then lower one finger at a time off the table. 3. Repeat 8 to 12 times with each hand. Shoulder blade squeeze for strength   1. Stand with your arms at your sides, and squeeze your shoulder blades together.  Do not raise your shoulders up as you squeeze. 2. Hold 6 seconds. 3. Repeat 8 to 12 times. Alternate arm and leg (bird dog) balance   Do this exercise slowly. Try to keep your body straight at all times. 1. Start on the floor, on your hands and knees. 2. Tighten your belly muscles by pulling your belly button in toward your spine. Be sure you continue to breathe normally and do not hold your breath. 3. Raise one arm off the floor, and hold it straight out in front of you. Be careful not to let your shoulder drop down, because that will twist your trunk. 4. Hold for about 6 seconds, then lower your arm and switch to your other arm. 5. Repeat 8 to 12 times with each arm. 6. When you can do this exercise with ease and no pain, try it with one leg raised off the floor. Hold your leg straight out behind you. Be careful not to let your hip drop down, because that will twist your trunk. 7. When holding your leg straight out becomes easier, try raising your opposite arm at the same time. Repeat steps 1 through 5. Balance-building exercise 1   1. Stand with a chair in front of you and a wall behind you, in case you lose your balance. 2. Stand with your feet together and your arms at your sides. 3. Move your head up and down 10 times. Balance-building exercise 2   1. Turn your head side to side 10 times. Balance-building exercise 3   1. Move your head diagonally up and down 10 times. Balance-building exercise 4   1. Move your head diagonally up and down 10 times on the other side. Follow-up care is a key part of your treatment and safety. Be sure to make and go to all appointments, and call your doctor if you are having problems. It's also a good idea to know your test results and keep a list of the medicines you take. Where can you learn more? Go to [email or web address].[email or web address]. org and sign in to your "[employer] account.  Enter T102 in the Hubs1 box to learn more about \"Movement Disorders: Exercises. \"     If you do not have an account, please click on the \"Sign Up Now\" link. Current as of: November 20, 2019               Content Version: 12.6  © 9166-0114 [employer]. Care instructions adapted under license by [facility] ([facility]). If you have questions about a medical condition or this instruction, always ask your healthcare professional. [address] any warranty or liability for your use of this information.

## 2021-01-28 NOTE — TELEPHONE ENCOUNTER
BASIL  IS CALLING BACK TO GET A REFERRAL DX  FROM   [address] - TO BE IN THE VISIT NOTE FROM 01/26/21    DX CODE G31.84                    G25.9   ADD THESE 2 TO THE NOTE     FAX:  [phone] - DIFFERENT # THAN BEFORE    PHONE:  [phone]

## 2021-02-05 ENCOUNTER — HOSPITAL ENCOUNTER (OUTPATIENT)
Age: 81
Discharge: HOME | End: 2021-02-05
Payer: MEDICARE

## 2021-02-05 DIAGNOSIS — R79.89 ELEVATED HOMOCYSTEINE: Chronic | ICD-10-CM

## 2021-02-05 DIAGNOSIS — R73.01 IMPAIRED FASTING GLUCOSE: Chronic | ICD-10-CM

## 2021-02-05 DIAGNOSIS — E78.2 MIXED HYPERTRIGLYCERIDEMIA: Chronic | ICD-10-CM

## 2021-02-05 DIAGNOSIS — D52.0 DIETARY FOLATE DEFICIENCY ANEMIA: Chronic | ICD-10-CM

## 2021-02-05 DIAGNOSIS — N39.42 URINARY INCONTINENCE WITHOUT SENSORY AWARENESS: ICD-10-CM

## 2021-02-05 DIAGNOSIS — I10 BENIGN ESSENTIAL HYPERTENSION: Chronic | ICD-10-CM

## 2021-02-05 LAB
A/G RATIO: 1.6 (ref 1.1–2.2)
ALBUMIN SERPL-MCNC: 3.9 G/DL (ref 3.4–5)
ALP BLD-CCNC: 65 U/L (ref 40–129)
ALT SERPL-CCNC: 17 U/L (ref 10–40)
ANION GAP SERPL CALCULATED.3IONS-SCNC: 8 MMOL/L (ref 3–16)
AST SERPL-CCNC: 20 U/L (ref 15–37)
BASOPHILS ABSOLUTE: 0 K/UL (ref 0–0.2)
BASOPHILS RELATIVE PERCENT: 0.4 %
BILIRUB SERPL-MCNC: 0.8 MG/DL (ref 0–1)
BUN BLDV-MCNC: 13 MG/DL (ref 7–20)
CALCIUM SERPL-MCNC: 8.8 MG/DL (ref 8.3–10.6)
CHLORIDE BLD-SCNC: 103 MMOL/L (ref 99–110)
CHOLESTEROL, TOTAL: 146 MG/DL (ref 0–199)
CO2: 28 MMOL/L (ref 21–32)
CREAT SERPL-MCNC: 0.9 MG/DL (ref 0.8–1.3)
CREATININE URINE: 67.4 MG/DL (ref 39–259)
EOSINOPHILS ABSOLUTE: 0.2 K/UL (ref 0–0.6)
EOSINOPHILS RELATIVE PERCENT: 3.2 %
GFR AFRICAN AMERICAN: >60
GFR NON-AFRICAN AMERICAN: >60
GLOBULIN: 2.4 G/DL
GLUCOSE BLD-MCNC: 102 MG/DL (ref 70–99)
HCT VFR BLD CALC: 43.2 % (ref 40.5–52.5)
HDLC SERPL-MCNC: 67 MG/DL (ref 40–60)
HEMOGLOBIN: 14.5 G/DL (ref 13.5–17.5)
HOMOCYSTEINE: 11 UMOL/L (ref 0–10)
LDL CHOLESTEROL CALCULATED: 64 MG/DL
LYMPHOCYTES ABSOLUTE: 1.5 K/UL (ref 1–5.1)
LYMPHOCYTES RELATIVE PERCENT: 30.5 %
MCH RBC QN AUTO: 34.8 PG (ref 26–34)
MCHC RBC AUTO-ENTMCNC: 33.5 G/DL (ref 31–36)
MCV RBC AUTO: 103.9 FL (ref 80–100)
MICROALBUMIN UR-MCNC: <1.2 MG/DL
MICROALBUMIN/CREAT UR-RTO: NORMAL MG/G (ref 0–30)
MONOCYTES ABSOLUTE: 0.4 K/UL (ref 0–1.3)
MONOCYTES RELATIVE PERCENT: 7.8 %
NEUTROPHILS ABSOLUTE: 2.8 K/UL (ref 1.7–7.7)
NEUTROPHILS RELATIVE PERCENT: 58.1 %
PDW BLD-RTO: 13 % (ref 12.4–15.4)
PLATELET # BLD: 183 K/UL (ref 135–450)
PMV BLD AUTO: 8.9 FL (ref 5–10.5)
POTASSIUM SERPL-SCNC: 4.2 MMOL/L (ref 3.5–5.1)
RBC # BLD: 4.16 M/UL (ref 4.2–5.9)
SODIUM BLD-SCNC: 139 MMOL/L (ref 136–145)
TOTAL PROTEIN: 6.3 G/DL (ref 6.4–8.2)
TRIGL SERPL-MCNC: 77 MG/DL (ref 0–150)
VLDLC SERPL CALC-MCNC: 15 MG/DL
WBC # BLD: 4.8 K/UL (ref 4–11)

## 2021-02-05 PROCEDURE — 82570 ASSAY OF URINE CREATININE: CPT

## 2021-02-05 PROCEDURE — 83090 ASSAY OF HOMOCYSTEINE: CPT

## 2021-02-05 PROCEDURE — 82043 UR ALBUMIN QUANTITATIVE: CPT

## 2021-02-05 PROCEDURE — 85025 COMPLETE CBC W/AUTO DIFF WBC: CPT

## 2021-02-05 PROCEDURE — 83036 HEMOGLOBIN GLYCOSYLATED A1C: CPT

## 2021-02-05 PROCEDURE — 80053 COMPREHEN METABOLIC PANEL: CPT

## 2021-02-05 PROCEDURE — 80061 LIPID PANEL: CPT

## 2021-02-06 LAB
ESTIMATED AVERAGE GLUCOSE: 102.5 MG/DL
HBA1C MFR BLD: 5.2 %

## 2021-02-09 ENCOUNTER — VIRTUAL VISIT (OUTPATIENT)
Dept: NEUROLOGY | Age: 81
End: 2021-02-09
Payer: MEDICARE

## 2021-02-09 DIAGNOSIS — G25.9 EXTRAPYRAMIDAL SYNDROME: Primary | ICD-10-CM

## 2021-02-09 DIAGNOSIS — F03.90 DEMENTIA WITHOUT BEHAVIORAL DISTURBANCE, UNSPECIFIED DEMENTIA TYPE: ICD-10-CM

## 2021-02-09 DIAGNOSIS — R25.8 BRADYKINESIA: ICD-10-CM

## 2021-02-09 DIAGNOSIS — D32.9 MENINGIOMA (HCC): ICD-10-CM

## 2021-02-09 PROCEDURE — 99442 PR PHYS/QHP TELEPHONE EVALUATION 11-20 MIN: CPT | Performed by: PSYCHIATRY & NEUROLOGY

## 2021-02-09 RX ORDER — DONEPEZIL HYDROCHLORIDE 10 MG/1
TABLET, FILM COATED ORAL
Qty: 90 TABLET | Refills: 1 | Status: SHIPPED | OUTPATIENT
Start: 2021-02-09 | End: 2021-09-29 | Stop reason: SDUPTHER

## 2021-02-09 NOTE — PROGRESS NOTES
 Allergic conjunctivitis of both eyes     Anemia     macrocytic    Asthmatic bronchitis     recurrent, and pneumonia    BBB (bundle branch block)     Right    Benign essential hypertension     BPH (benign prostatic hyperplasia)     Bradycardia 3/29/2015    hospital x 36 hours. Stopped Verapamil    Colon polyp 2/18/2015    q 3 yr    Colon polyp 03/15/2018    COLONOSCOPY, DR ACUÑA [address], 3 MM ASCENDING COLON POLYP REMOVED BY COLD BIOPSY FORCEPS, 6 MM TRANSVERSE COLON POLYP REMOVED BY COLD SNARE POLYPECTOMY, 8 MM DESCENDING COLON POLYP REMOVED BY HOT SNARE POLYPECTOMY. REPEAT IN 3 YRS.  Coronary atheroma 2006 or 2007    mild plaques -no clinical CAD wkup  for irreg heart beat - testing neg dr. Arsenio Loza not seen for yrs    DDD (degenerative disc disease), lumbar     L5 - S1    Diverticulosis of colon 03/15/2018    COLONOSCOPY,  [facility],  [address], MILD TO MODERATE LEFT SIDED DIVERTICULOSIS. 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- no flare up at this time 8/65/2014    Hearing loss d/t noise     bilateral aids    Impaired fasting glucose     Leukopenia     myelodysplasia/ macrocytic anemia stable 5-6 yrs, fmd monitoring no bone marrow testdon, confirmed with lab work    Low back pain on left side with sciatica 4/76/6730    Metabolic syndrome     Mixed hypertriglyceridemia     Nausea & vomiting     Plantar fasciitis, bilateral 12/1/2012    L>R     Pneumonia     recurrent    Prostatitis 1970s    Tubulovillous adenoma     of colon    Urinary incontinence without sensory awareness - related to BPH     Vitamin D deficiency      Family History   Problem Relation Age of Onset    Diabetes Mother     Hypertension Mother     Stroke Mother 80    Breast Cancer Mother  Heart Surgery Mother         heart valve replacement    Dementia Mother 80    Prostate Cancer Father     Cancer Father     Diabetes Maternal Grandmother     Stroke Maternal Grandfather     Stroke Paternal Grandfather     Other Sister         DDD C & T spine    Scoliosis Sister     Liver Disease Sister         hepatitits    Mental Retardation Brother         birth anoxia    Obesity Brother     Seizures Brother     Parkinsonism Brother 72    Sudden Death Maternal Uncle     Coronary Art Dis Maternal Uncle     Heart Attack Maternal Uncle     Other Son         Benign brain tumor    No Known Problems Daughter      Social History     Socioeconomic History    Marital status:      Spouse name: Michelle Han Number of children: 2    Years of education: 23    Highest education level: None   Occupational History    Occupation: RETIRED PSYCHOLOGIST 2009   Social Needs    Financial resource strain: Not hard at all   [employer] insecurity     Worry: Never true     Inability: Never true    Transportation needs     Medical: No     Non-medical: No   Tobacco Use    Smoking status: Never Smoker    Smokeless tobacco: Never Used   Substance and Sexual Activity    Alcohol use: Yes     Alcohol/week: 5.8 standard drinks     Types: 7 Standard drinks or equivalent per week     Comment: 1 glass of wine qhs. Has to have at 8 pm or trouble walking.  Drug use: No     Comment: Never tried drugs.     Sexual activity: Never   Lifestyle    Physical activity     Days per week: None     Minutes per session: None    Stress: None   Relationships    Social connections     Talks on phone: None     Gets together: None     Attends [employer] service: None     Active member of club or organization: None     Attends meetings of clubs or organizations: None     Relationship status: None    Intimate partner violence     Fear of current or ex partner: None     Emotionally abused: None     Physically abused: None Forced sexual activity: None   Other Topics Concern    None   Social History Narrative    Memory issues 2016. Exercise with Nautilus 20 min 3x/wk &  Walks 20 min 2-3x/wk. 1/10/17.  7/12/17. Walking 3 x/wk for 1/4-1/2 mi, using a nautilus machine 1-2x/wk 20-25 min -plans an elliptical machine. 11/8/17. Can't use the elliptical due to Parkinson's. Joined [employer]. Not there yet. 1/16/18. Goes to 2-3x/wk [employer]. 5/9/18. 11/8/18. 3x/wk 30 min. 3/14/19. 4/1/19. Does bike, machines for arms/leg/core body. 6/6/19. Goes to gym 3x/wk for 30 min. 11/20/19. Doing Parkinson's exercises from a video for 10 min. 7/8/20. Walks mostly to go to the bathroom with 4 prong cane. Walks around the house. Walks outside. Was doing [employer]. 9/3/20. Getting physical therapy at the house. Using a walker. Also getting speech therapy. Has hard time getting up from a chair. 1/26/21. Objective:  Exam:  There were no vitals taken for this visit.         Data :  LABS:  General Labs:    CBC:   Lab Results   Component Value Date    WBC 4.8 02/05/2021    RBC 4.16 02/05/2021    HGB 14.5 02/05/2021    HCT 43.2 02/05/2021    .9 02/05/2021    MCH 34.8 02/05/2021    MCHC 33.5 02/05/2021    RDW 13.0 02/05/2021     02/05/2021    MPV 8.9 02/05/2021     BMP:    Lab Results   Component Value Date     02/05/2021    K 4.2 02/05/2021     02/05/2021    CO2 28 02/05/2021    BUN 13 02/05/2021    LABALBU 3.9 02/05/2021    CREATININE 0.9 02/05/2021    CALCIUM 8.8 02/05/2021    GFRAA >60 02/05/2021    GFRAA 55 04/16/2013    LABGLOM >60 02/05/2021    GLUCOSE 102 02/05/2021    GLUCOSE 107 08/01/2011     RADIOLOGY REVIEW:  I have reviewed radiology report(s) of: MRI brain    Impression :  Extrapyramidal syndrome/parkinsonian syndrome,   Dysarthria   Tremor stable   Ataxia stable   Dementia, probable Alzheimer's versus Lewy body dementia  MRI brain showed cortical atrophy There was an incidental falx meningioma in the left frontal region  B12 level was borderline suspicious for B12 deficiency    Plan :  Discussed with patient and his wife  Continue Oral-B 12 supplementation  There is no treatment necessary for the incidental meningioma  Continue Aricept 10 mg at night  Patient had  stopped the Namenda and has not noticed any major changes because of that. Continue Sinemet 25/100, 2 tablets 4 times daily  Return in 6 months  Time spent with patient and wife during this telephone visit was: 11 to 20 minutes      Please note a portion of  this chart was generated using dragon dictation software. Although every effort was made to ensure the accuracy of this automated transcription, some errors in transcription may have occurred. Pursuant to the emergency declaration under the [facility] waiver authority and the [employer] and [employer] Act, this Virtual  Visit was conducted, with patient's consent, to reduce the patient's risk of exposure to COVID-19 and provide continuity of care for an established patient.

## 2021-02-22 ENCOUNTER — TELEPHONE (OUTPATIENT)
Dept: FAMILY MEDICINE CLINIC | Age: 81
End: 2021-02-22

## 2021-02-22 NOTE — TELEPHONE ENCOUNTER
LEFT VM FOR RADHA TO CALL BACK WITH MORE INFO ON WHAT ORDERS THEY ARE WAITING ON- WILL ROUTE TO DR Sammy Brown TO SEE IF HE KNOWS ANYTHING ABOUT THEM.

## 2021-02-22 NOTE — TELEPHONE ENCOUNTER
[facility] - [facility] [place] @  [phone] - CALLING RE:  5 ORDERS THEY ARE WAITING ON. PLEASE UPDATE HER.

## 2021-02-23 NOTE — TELEPHONE ENCOUNTER
Assume messages were about signing home health orders. If this is a case of already been signed and placed in the fax bin 2/22/21. Call Karime and see if that is correct.

## 2021-03-08 ENCOUNTER — TELEPHONE (OUTPATIENT)
Dept: FAMILY MEDICINE CLINIC | Age: 81
End: 2021-03-08

## 2021-03-08 NOTE — TELEPHONE ENCOUNTER
[facility] REFERRAL COMPLETED AND APPROVED 03/08/2021-03/03/2022, [address], AUTH# [id number]. PT'S WIFE NOTIFIED. [facility]     [place], [address] [place] [address], [address] [place] [address]  PHONE [phone]  [phone]  NPI# [id number]    DX:  H18.519 [facility]    FAXED REFERRAL TO DR BAE'S OFFICE.   [facility]

## 2021-03-08 NOTE — TELEPHONE ENCOUNTER
SPOKE TO WIFE AND WORKING ON [employer] REFERRAL TO [facility] [address] FOR DX:  [facility]. PT HAS AN APPT TODAY.   [address] [facility]

## 2021-03-08 NOTE — TELEPHONE ENCOUNTER
Patient is seeing Dr. Bebeto Albarran today and they need a referral sent over - this is a routine visit ever year. Fax - patient does not know the fax number  Phone - [phone]    His appointment is at 12:50 pm today.

## 2021-03-18 ENCOUNTER — TELEPHONE (OUTPATIENT)
Dept: FAMILY MEDICINE CLINIC | Age: 81
End: 2021-03-18

## 2021-03-18 DIAGNOSIS — N39.42 URINARY INCONTINENCE WITHOUT SENSORY AWARENESS: ICD-10-CM

## 2021-03-18 DIAGNOSIS — E55.9 VITAMIN D DEFICIENCY: Chronic | ICD-10-CM

## 2021-03-18 DIAGNOSIS — E78.2 MIXED HYPERTRIGLYCERIDEMIA: Chronic | ICD-10-CM

## 2021-03-18 DIAGNOSIS — J32.9 RECURRENT SINUSITIS: ICD-10-CM

## 2021-03-18 DIAGNOSIS — K57.90 DIVERTICULOSIS: ICD-10-CM

## 2021-03-18 DIAGNOSIS — G20 DEMENTIA DUE TO PARKINSON'S DISEASE WITHOUT BEHAVIORAL DISTURBANCE (HCC): ICD-10-CM

## 2021-03-18 DIAGNOSIS — F02.80 DEMENTIA DUE TO PARKINSON'S DISEASE WITHOUT BEHAVIORAL DISTURBANCE (HCC): ICD-10-CM

## 2021-03-18 DIAGNOSIS — L11.1 GROVER'S DISEASE: Chronic | ICD-10-CM

## 2021-03-18 DIAGNOSIS — D51.8 MACROCYTIC ANEMIA WITH VITAMIN B12 DEFICIENCY: Primary | ICD-10-CM

## 2021-03-18 DIAGNOSIS — R79.89 ELEVATED HOMOCYSTEINE: Chronic | ICD-10-CM

## 2021-03-18 RX ORDER — TRIAMCINOLONE ACETONIDE 1 MG/G
CREAM TOPICAL
Qty: 454 G | Refills: 0 | Status: SHIPPED | OUTPATIENT
Start: 2021-03-18 | End: 2021-06-28

## 2021-03-18 NOTE — TELEPHONE ENCOUNTER
PT IS IN NEED OF SCRIPTS TO BE SENT TO [facility] - HE WAS JUST APPROVED FOR THIS     FAX[de-identified] [phone]    LARGE PULL UPS -   TISSUES  WIPES    X-14 VITAMINS  FOLIC ACID   VITAMIN C  VITAMIN D  FISH OIL  PROBIOTIC  PREBIOTIC       [address]  @  [phone]

## 2021-03-19 NOTE — TELEPHONE ENCOUNTER
Call patient's wife Sima Putnam. Get the details. For each medicine below we need:    Dosage and number of times per day so we can calculate how many pills per month. Some of these facts are listed in some cases. For instance B12 dosage is not listed. We cannot just write probiotic it has to be a name i.e. acidophilus or align etc. or brand name. Same is true for many things like a prebiotic and the fish oil. Some of these things may have been recommended but are not on his med list.  For instance I been recommending everybody take vitamin C 500 mg once a day during the Covid pandemic but I am not sure of the dose the is actually taking. And vitamin C is not on his med list.     We also need numbers of pull ups he uses per day to get a monthly average in the same would have to do with tissues wipes. (Diagnosis urge incontinence). Hand do we actually fax these prescriptions to [facility] or to the pharmacy?

## 2021-03-22 RX ORDER — CYANOCOBALAMIN (VITAMIN B-12) 1000 MCG
1 TABLET ORAL DAILY
Qty: 30 TABLET | Refills: 11 | Status: SHIPPED | OUTPATIENT
Start: 2021-03-22

## 2021-03-22 RX ORDER — CHLORAL HYDRATE 500 MG
1 CAPSULE ORAL 2 TIMES DAILY
Qty: 60 CAPSULE | Refills: 11 | Status: SHIPPED | OUTPATIENT
Start: 2021-03-22 | End: 2022-01-27 | Stop reason: SINTOL

## 2021-03-22 RX ORDER — GREEN TEA/HOODIA GORDONII 315-12.5MG
1 CAPSULE ORAL DAILY
Qty: 30 TABLET | Refills: 11 | Status: SHIPPED | OUTPATIENT
Start: 2021-03-22 | End: 2021-04-21

## 2021-03-22 RX ORDER — ASCORBIC ACID 500 MG
500 TABLET ORAL DAILY
Qty: 30 TABLET | Refills: 11 | Status: SHIPPED | OUTPATIENT
Start: 2021-03-22

## 2021-03-22 RX ORDER — UREA 10 %
800 LOTION (ML) TOPICAL DAILY
Qty: 30 TABLET | Refills: 11 | Status: SHIPPED | OUTPATIENT
Start: 2021-03-22

## 2021-03-22 NOTE — TELEPHONE ENCOUNTER
Spoke to wife and confirmed location to fax to . Faxed to [facility].  [facility] [employer] FAX[de-identified] [phone]

## 2021-03-25 DIAGNOSIS — I10 BENIGN ESSENTIAL HYPERTENSION: Chronic | ICD-10-CM

## 2021-03-25 RX ORDER — LISINOPRIL 40 MG/1
TABLET ORAL
Qty: 90 TABLET | Refills: 0 | Status: SHIPPED | OUTPATIENT
Start: 2021-03-25 | End: 2021-06-22

## 2021-04-16 DIAGNOSIS — N39.41 URGE INCONTINENCE OF URINE: ICD-10-CM

## 2021-04-22 DIAGNOSIS — R27.0 ATAXIA: ICD-10-CM

## 2021-04-22 DIAGNOSIS — G25.9 EXTRAPYRAMIDAL SYNDROME: ICD-10-CM

## 2021-04-22 DIAGNOSIS — R25.8 BRADYKINESIA: ICD-10-CM

## 2021-06-04 ENCOUNTER — OFFICE VISIT (OUTPATIENT)
Dept: FAMILY MEDICINE CLINIC | Age: 81
End: 2021-06-04
Payer: MEDICARE

## 2021-06-04 VITALS
WEIGHT: 179.8 LBS | DIASTOLIC BLOOD PRESSURE: 80 MMHG | HEART RATE: 70 BPM | SYSTOLIC BLOOD PRESSURE: 130 MMHG | OXYGEN SATURATION: 97 % | BODY MASS INDEX: 29.96 KG/M2 | HEIGHT: 65 IN

## 2021-06-04 DIAGNOSIS — N39.42 URINARY INCONTINENCE WITHOUT SENSORY AWARENESS: ICD-10-CM

## 2021-06-04 DIAGNOSIS — N39.41 URGE INCONTINENCE OF URINE: Primary | ICD-10-CM

## 2021-06-04 DIAGNOSIS — R35.0 URINARY FREQUENCY: ICD-10-CM

## 2021-06-04 LAB
BILIRUBIN, POC: ABNORMAL
BLOOD URINE, POC: NEGATIVE
CLARITY, POC: ABNORMAL
COLOR, POC: ABNORMAL
GLUCOSE URINE, POC: NEGATIVE
KETONES, POC: ABNORMAL
LEUKOCYTE EST, POC: NEGATIVE
NITRITE, POC: NEGATIVE
PH, POC: 6
PROTEIN, POC: ABNORMAL
SPECIFIC GRAVITY, POC: >=1.03
UROBILINOGEN, POC: 0.2

## 2021-06-04 PROCEDURE — 1123F ACP DISCUSS/DSCN MKR DOCD: CPT | Performed by: FAMILY MEDICINE

## 2021-06-04 PROCEDURE — 81002 URINALYSIS NONAUTO W/O SCOPE: CPT | Performed by: FAMILY MEDICINE

## 2021-06-04 PROCEDURE — G8417 CALC BMI ABV UP PARAM F/U: HCPCS | Performed by: FAMILY MEDICINE

## 2021-06-04 PROCEDURE — G8427 DOCREV CUR MEDS BY ELIG CLIN: HCPCS | Performed by: FAMILY MEDICINE

## 2021-06-04 PROCEDURE — 99215 OFFICE O/P EST HI 40 MIN: CPT | Performed by: FAMILY MEDICINE

## 2021-06-04 PROCEDURE — 1036F TOBACCO NON-USER: CPT | Performed by: FAMILY MEDICINE

## 2021-06-04 PROCEDURE — 4040F PNEUMOC VAC/ADMIN/RCVD: CPT | Performed by: FAMILY MEDICINE

## 2021-06-04 SDOH — ECONOMIC STABILITY: FOOD INSECURITY: WITHIN THE PAST 12 MONTHS, THE FOOD YOU BOUGHT JUST DIDN'T LAST AND YOU DIDN'T HAVE MONEY TO GET MORE.: NEVER TRUE

## 2021-06-04 SDOH — ECONOMIC STABILITY: FOOD INSECURITY: WITHIN THE PAST 12 MONTHS, YOU WORRIED THAT YOUR FOOD WOULD RUN OUT BEFORE YOU GOT MONEY TO BUY MORE.: NEVER TRUE

## 2021-06-04 ASSESSMENT — SOCIAL DETERMINANTS OF HEALTH (SDOH): HOW HARD IS IT FOR YOU TO PAY FOR THE VERY BASICS LIKE FOOD, HOUSING, MEDICAL CARE, AND HEATING?: NOT HARD AT ALL

## 2021-06-04 NOTE — PROGRESS NOTES
Nils Bloch (:  1940) is a 80 y.o. male,Established patient, here for evaluation of the following chief complaint(s): Other (urinary incontinence is progressively getting worse) and Ear Problem (needs both ears checked, left hearing aide will not stay in)       ASSESSMENT/PLAN:   [facility] was seen today for other and ear problem. Diagnoses and all orders for this visit:    Urge incontinence of urine  Continue Myrbetriq  Trial of Prelief 2 pills twice daily. (Possible interstitial cystitis)  If not better call for urology. Can try going off 1 urinary medication weekly and see if it makes a difference. Urinary frequency  Can try increasing fish oil to 3x/day. Urinary incontinence without sensory awareness - related to BPH? Stay on Tamsulosin. Hearing aid discomfort  SEE THE AUDIOLOGIST. Return for scheduled/requested routine visit. Subjective   SUBJECTIVE/OBJECTIVE:  Chief Complaint   Patient presents with    Other     urinary incontinence is progressively getting worse    Ear Problem     needs both ears checked, left hearing aide will not stay in   307  HPI    Urinary incontinence  Had problems with urgency since . Was on Vesicare . Was just before his TURP. Stopped his pop. No carbonation nor caffeine.  he had more problems when put on Aricept. On fish oil bid. Started on Myrbetriq . Then on Tamsulosin 0.4 mg nightly ~ 1 yr ago by urologist. Now he thinks he has to go and stands and is too late. Ear problem  Left hearing aid will not stay in. He has lost 8 lbs since last visit. Lost 20 lbs over last 2 years. Wife looked but couldn't tell what is going on.       Review of Systems     Past Medical History:   Diagnosis Date    Allergic conjunctivitis of both eyes     Anemia     macrocytic    Asthmatic bronchitis     recurrent, and pneumonia    BBB (bundle branch block)     Right    Benign essential hypertension     BPH (benign prostatic hyperplasia)  Bradycardia 3/29/2015    hospital x 36 hours. Stopped Verapamil    Colon polyp 2/18/2015    q 3 yr    Colon polyp 03/15/2018    COLONOSCOPY, DR ACUÑA [address] [facility], 3 MM ASCENDING COLON POLYP REMOVED BY COLD BIOPSY FORCEPS, 6 MM TRANSVERSE COLON POLYP REMOVED BY COLD SNARE POLYPECTOMY, 8 MM DESCENDING COLON POLYP REMOVED BY HOT SNARE POLYPECTOMY. REPEAT IN 3 YRS.  Coronary atheroma 2006 or 2007    mild plaques -no clinical CAD wkup  for irreg heart beat - testing neg dr. Cathleen Grayson not seen for yrs    DDD (degenerative disc disease), lumbar     L5 - S1    Diverticulosis of colon 03/15/2018    COLONOSCOPY, [facility],  [facility], MILD TO MODERATE LEFT SIDED DIVERTICULOSIS. 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- no flare up at this time 8/65/2014    Hearing loss d/t noise     bilateral aids    Impaired fasting glucose     Leukopenia     myelodysplasia/ macrocytic anemia stable 5-6 yrs, fmd monitoring no bone marrow testdon, confirmed with lab work    Low back pain on left side with sciatica 3/10/3699    Metabolic syndrome     Mixed hypertriglyceridemia     Nausea & vomiting     Plantar fasciitis, bilateral 12/1/2012    L>R     Pneumonia     recurrent    Prostatitis 1970s    Tubulovillous adenoma     of colon    Urinary incontinence without sensory awareness - related to BPH     Vitamin D deficiency        Past Surgical History:   Procedure Laterality Date    CATARACT REMOVAL WITH IMPLANT  bilateral    COLONOSCOPY  2/18/2015    1 polyp, q 3 years    COLONOSCOPY N/A 03/15/2018    COLONOSCOPY, DR ACUÑA [address] [facility], 3 MM ASCENDING COLON POLYP REMOVED BY COLD BIOPSY FORCEPS, 6 MM TRANSVERSE COLON POLYP REMOVED BY COLD SNARE POLYPECTOMY, 8 MM DESCENDING COLON POLYP REMOVED BY Aide comes out 2x/wk and does PT exercises. 6/4/21     Social Determinants of Health     Financial Resource Strain: Low Risk     Difficulty of Paying Living Expenses: Not hard at all   Food Insecurity: No Food Insecurity    Worried About Running Out of Food in the Last Year: Never true    Jacqueline of Food in the Last Year: Never true   Transportation Needs:     Lack of Transportation (Medical):      Lack of Transportation (Non-Medical):    Physical Activity:     Days of Exercise per Week:     Minutes of Exercise per Session:    Stress:     Feeling of Stress :    Social Connections:     Frequency of Communication with Friends and Family:     Frequency of Social Gatherings with Friends and Family:     Attends Church Services:     Active Member of Clubs or Organizations:     Attends Club or Organization Meetings:     Marital Status:    Intimate Partner Violence:     Fear of Current or Ex-Partner:     Emotionally Abused:     Physically Abused:     Sexually Abused:        Family History   Problem Relation Age of Onset    Diabetes Mother     Hypertension Mother     Stroke Mother 80    Breast Cancer Mother     Heart Surgery Mother         heart valve replacement    Dementia Mother 80    Prostate Cancer Father     Cancer Father     Diabetes Maternal Grandmother     Stroke Maternal Grandfather     Stroke Paternal Grandfather     Other Sister         DDD C & T spine    Scoliosis Sister     Liver Disease Sister         hepatitits    Mental Retardation Brother         birth anoxia    Obesity Brother     Seizures Brother     Parkinsonism Brother 72    Sudden Death Maternal Uncle     Coronary Art Dis Maternal Uncle     Heart Attack Maternal Uncle     Other Son         Benign brain tumor    No Known Problems Daughter        Allergies   Allergen Reactions    Demerol Nausea Only       Current Outpatient Medications   Medication Sig Dispense Refill    carbidopa-levodopa (SINEMET)  MG per tablet TAKE 2 TABLETS  4 TIMES DAILY 720 tablet 0    mirabegron (MYRBETRIQ) 50 MG TB24 TAKE 1 TABLET EVERY MORNING 90 tablet 0    lisinopril (PRINIVIL;ZESTRIL) 40 MG tablet TAKE 1 TABLET DAILY FOR HIGH BLOOD PRESSURE 90 tablet 0    Cyanocobalamin (B-12) 1000 MCG TABS Take 1 tablet by mouth daily 30 tablet 11    Cholecalciferol (VITAMIN D3) 25 MCG (1000 UT) CAPS Take 1 capsule by mouth daily 30 capsule 11    folic acid (FOLVITE) 467 MCG tablet Take 1 tablet by mouth daily Indications: Increased Amount of Homocysteine in the Blood 30 tablet 11    Omega-3 1000 MG CAPS Take 1 capsule by mouth 2 times daily 60 capsule 11    ascorbic acid (VITAMIN C) 500 MG tablet Take 1 tablet by mouth daily 30 tablet 11    triamcinolone (KENALOG) 0.1 % cream APPLY TOPICALLY TO THE AFFECTED AREA(S) TWICE DAILY AS DIRECTED 454 g 0    donepezil (ARICEPT) 10 MG tablet TAKE 1 TABLET EVERY NIGHT 90 tablet 1    tamsulosin (FLOMAX) 0.4 MG capsule Take 0.4 mg by mouth daily Indications: Enlarged Prostate with Urination Problems, urinary urgency and incontinence (uses Depends)       simvastatin (ZOCOR) 10 MG tablet TAKE 1 TABLET EVERY NIGHT 90 tablet 3    ipratropium (ATROVENT) 0.06 % nasal spray 2 sprays by Nasal route 3 times daily as needed for Rhinitis 3 Bottle 3    clobetasol (TEMOVATE) 0.05 % cream APPLY TOPICALLY TWICE DAILY AS DIRECTED 15 g 2    budesonide-formoterol (SYMBICORT) 160-4.5 MCG/ACT AERO Inhale 2 puffs into the lungs 2 times daily Use spacer. Rinse and spit. 1 Inhaler 1    aspirin 81 MG EC tablet Take 81 mg by mouth daily Indications: Cardiovascular Risk Reduction       Misc. Devices (ROLLER [place]) MISC Use whenever walking. . 1 each 0     No current facility-administered medications for this visit. Objective   Physical Exam  Vitals and nursing note reviewed. Constitutional:       General: He is not in acute distress. Appearance: He is well-developed. He is not diaphoretic.    HENT:      Right Ear: Tympanic membrane, ear canal and external ear normal. No drainage, swelling or tenderness. No middle ear effusion. There is no impacted cerumen. Tympanic membrane is not injected, scarred, perforated, erythematous, retracted or bulging. Left Ear: Tympanic membrane, ear canal and external ear normal. No drainage, swelling or tenderness. No middle ear effusion. There is no impacted cerumen. Tympanic membrane is not injected, scarred, perforated, erythematous, retracted or bulging. Eyes:      General: No scleral icterus. Right eye: No discharge. Left eye: No discharge. Conjunctiva/sclera: Conjunctivae normal.   Neck:      Thyroid: No thyroid mass or thyromegaly. Vascular: No carotid bruit or JVD. Trachea: Trachea and phonation normal. No tracheal tenderness or tracheal deviation. Cardiovascular:      Rate and Rhythm: Normal rate and regular rhythm. Heart sounds: Normal heart sounds, S1 normal and S2 normal. Heart sounds not distant. No murmur heard. No friction rub. No gallop. No S3 or S4 sounds. Pulmonary:      Effort: Pulmonary effort is normal. No respiratory distress. Breath sounds: Normal breath sounds. No stridor. No decreased breath sounds, wheezing, rhonchi or rales. Musculoskeletal:      Cervical back: Neck supple. Lymphadenopathy:      Head:      Right side of head: No submandibular, tonsillar, preauricular or posterior auricular adenopathy. Left side of head: No submandibular, tonsillar, preauricular or posterior auricular adenopathy. Cervical: No cervical adenopathy. Right cervical: No superficial, deep or posterior cervical adenopathy. Left cervical: No superficial, deep or posterior cervical adenopathy. Skin:     General: Skin is warm and dry. Coloration: Skin is not pale. Neurological:      Mental Status: He is alert. Motor: Weakness: trouble sitting up straight on his own  leans to left.       Gait: Gait abnormal. Deep Tendon Reflexes:      Reflex Scores:       Patellar reflexes are 2+ on the right side and 2+ on the left side. Comments: Slow small steps. Decrease in facial expression. Psychiatric:         Speech: Speech is delayed. Behavior: Behavior normal.         Cognition and Memory: Cognition is impaired. Memory is impaired. He exhibits impaired recent memory. Judgment: Judgment normal.      Comments: Needs repeats of most questions          Vitals:    06/04/21 1444   BP: 130/80   Site: Right Upper Arm   Position: Sitting   Cuff Size: Medium Adult   Pulse: 70   SpO2: 97%   Weight: 179 lb 12.8 oz (81.6 kg)   Height: 5' 5\" (1.651 m)     BP Readings from Last 3 Encounters:   06/04/21 130/80   09/03/20 116/74   07/08/20 122/74     Pulse Readings from Last 3 Encounters:   06/04/21 70   09/03/20 78   07/08/20 75     Wt Readings from Last 3 Encounters:   06/04/21 179 lb 12.8 oz (81.6 kg)   09/03/20 187 lb (84.8 kg)   07/08/20 185 lb (83.9 kg)     Body mass index is 29.92 kg/m².      2/5/2021 10:40 2/5/2021 14:50   Sodium 139    Potassium 4.2    Chloride 103    CO2 28    BUN 13    Creatinine 0.9    Anion Gap 8    GFR Non-African American >60    Glucose 102 (H)    Calcium 8.8    Total Protein 6.3 (L)    Cholesterol, Total 146    HDL Cholesterol 67 (H)    LDL Calculated 64    Triglycerides 77    VLDL Cholesterol Calculated 15    Homocysteine 11 (H)    Albumin 3.9    Globulin 2.4    Albumin/Globulin Ratio 1.6    Alk Phos 65    ALT 17    AST 20    Bilirubin 0.8    Hemoglobin A1C 5.2    eAG (mg/dL) 102.5    WBC 4.8    RBC 4.16 (L)    Hemoglobin Quant 14.5    Hematocrit 43.2    .9 (H)    MCH 34.8 (H)    MCHC 33.5    MPV 8.9    RDW 13.0    Platelet Count 630    Neutrophils % 58.1    Lymphocyte % 30.5    Monocytes % 7.8    Eosinophils % 3.2    Basophils % 0.4    Neutrophils Absolute 2.8    Lymphocytes Absolute 1.5    Monocytes Absolute 0.4    Eosinophils Absolute 0.2    Basophils Absolute 0.0

## 2021-06-04 NOTE — PATIENT INSTRUCTIONS
Kaylyn Dougherty was seen today for other and ear problem. Diagnoses and all orders for this visit:    Urge incontinence of urine  Continue Myrbetriq  Trial of Prelief 2 pills twice daily. (Possible interstitial cystitis)  If not better call for urology. Can try going off 1 urinary medication weekly and see if it makes a difference. Urinary frequency  Can try increasing fish oil to 3x/day. Urinary incontinence without sensory awareness - related to BPH? Stay on Tamsulosin. Hearing aid discomfort  SEE THE AUDIOLOGIST.

## 2021-06-07 ENCOUNTER — TELEPHONE (OUTPATIENT)
Dept: FAMILY MEDICINE CLINIC | Age: 81
End: 2021-06-07

## 2021-06-07 DIAGNOSIS — R53.1 GENERALIZED WEAKNESS: ICD-10-CM

## 2021-06-07 DIAGNOSIS — M51.36 DDD (DEGENERATIVE DISC DISEASE), LUMBAR: ICD-10-CM

## 2021-06-07 DIAGNOSIS — G31.84 MILD COGNITIVE IMPAIRMENT: ICD-10-CM

## 2021-06-07 DIAGNOSIS — R29.6 RECURRENT FALLS: ICD-10-CM

## 2021-06-07 DIAGNOSIS — G20 PARKINSON'S DISEASE (HCC): Primary | ICD-10-CM

## 2021-06-07 NOTE — TELEPHONE ENCOUNTER
They need to have an order faxed to [employer] [facility]  Fax [phone]    Need a Lightweight foldable rolling walker with a seat.

## 2021-06-08 NOTE — TELEPHONE ENCOUNTER
SCRIPT PLACED IN BIN TO BE FAXED BY FRONT OFFICE. CALLED AND SPOKE TO PATIENT TO LET HER KNOW THIS WAS DONE.  SC

## 2021-06-11 ENCOUNTER — TELEPHONE (OUTPATIENT)
Dept: FAMILY MEDICINE CLINIC | Age: 81
End: 2021-06-11

## 2021-06-11 DIAGNOSIS — N39.42 URINARY INCONTINENCE WITHOUT SENSORY AWARENESS: ICD-10-CM

## 2021-06-11 DIAGNOSIS — R35.0 URINARY FREQUENCY: ICD-10-CM

## 2021-06-11 DIAGNOSIS — N39.41 URGE INCONTINENCE OF URINE: Primary | ICD-10-CM

## 2021-06-11 NOTE — TELEPHONE ENCOUNTER
Pt wife is calling to get a referral to see the urologist.    PROVIDER NAME: Sanjay Davies [facility]    PROVIDER ADDRESS:  [place] [address] [address], ZIP:  [address] [facility]  PROVIDER PHONE:  [phone]  PROVIDER FAX:  [phone]  PROVIDER NPI:    PROVIDER TAX I.D.:      DX CODE:      CPT CODE:      NUMBER OF VISITS:       DATE OF SERVICE:  Jessie 15, 2021    CALLER NAME:  Daniel Genao

## 2021-06-16 DIAGNOSIS — G31.84 MILD COGNITIVE IMPAIRMENT: ICD-10-CM

## 2021-06-16 DIAGNOSIS — G20 PARKINSON'S DISEASE (HCC): ICD-10-CM

## 2021-06-16 DIAGNOSIS — M51.36 DDD (DEGENERATIVE DISC DISEASE), LUMBAR: ICD-10-CM

## 2021-06-16 DIAGNOSIS — R53.1 GENERALIZED WEAKNESS: ICD-10-CM

## 2021-06-16 DIAGNOSIS — R29.6 RECURRENT FALLS: ICD-10-CM

## 2021-06-16 NOTE — PROGRESS NOTES
Parkinson's disease ([facility] [address]  -     Misc. Devices (ROLLER [place]) MISC; Use whenever walking. .    DDD (degenerative disc disease), lumbar  -     Misc. Devices (ROLLER [place]) MISC; Use whenever walking. .    Mild cognitive impairment  -     Misc. Devices (ROLLER [place]) MISC; Use whenever walking. .    Generalized weakness  -     Misc. Devices (ROLLER [place]) MISC; Use whenever walking. .    Recurrent falls  -     Misc. Devices (ROLLER [place]) MISC; Use whenever walking. .  RX reprinted and placed in fax bin.

## 2021-06-21 DIAGNOSIS — I10 BENIGN ESSENTIAL HYPERTENSION: Chronic | ICD-10-CM

## 2021-06-22 RX ORDER — LISINOPRIL 40 MG/1
TABLET ORAL
Qty: 90 TABLET | Refills: 1 | Status: SHIPPED | OUTPATIENT
Start: 2021-06-22 | End: 2021-12-28

## 2021-06-27 DIAGNOSIS — L11.1 GROVER'S DISEASE: Chronic | ICD-10-CM

## 2021-06-28 RX ORDER — TRIAMCINOLONE ACETONIDE 1 MG/G
CREAM TOPICAL
Qty: 454 G | Refills: 0 | Status: SHIPPED | OUTPATIENT
Start: 2021-06-28 | End: 2021-09-27

## 2021-07-19 ENCOUNTER — TELEPHONE (OUTPATIENT)
Dept: FAMILY MEDICINE CLINIC | Age: 81
End: 2021-07-19

## 2021-07-19 DIAGNOSIS — N39.41 URGE INCONTINENCE OF URINE: ICD-10-CM

## 2021-07-19 NOTE — TELEPHONE ENCOUNTER
I also need an OT person to come to our home to help me set up our home to enable Barrie to maneuver and move from place to place. (He has Parkinson's and dementia) Do you have a recommendation?

## 2021-07-20 ENCOUNTER — PATIENT MESSAGE (OUTPATIENT)
Dept: FAMILY MEDICINE CLINIC | Age: 81
End: 2021-07-20

## 2021-07-20 DIAGNOSIS — G20 PARKINSON'S DISEASE (HCC): Primary | ICD-10-CM

## 2021-07-20 DIAGNOSIS — G20 DEMENTIA DUE TO PARKINSON'S DISEASE WITHOUT BEHAVIORAL DISTURBANCE (HCC): ICD-10-CM

## 2021-07-20 DIAGNOSIS — F02.80 DEMENTIA DUE TO PARKINSON'S DISEASE WITHOUT BEHAVIORAL DISTURBANCE (HCC): ICD-10-CM

## 2021-07-20 NOTE — TELEPHONE ENCOUNTER
From: Audrey Muñoz  To: Hawa Osullivan DO  Sent: 7/20/2021 12:14 PM EDT  Subject: Non-Urgent Medical Question    Christin Colby needs a new referral to Dr Art Altman. Fax # is [phone]. He has an appt on August 10. Thank you.

## 2021-07-27 ENCOUNTER — OFFICE VISIT (OUTPATIENT)
Dept: FAMILY MEDICINE CLINIC | Age: 81
End: 2021-07-27
Payer: MEDICARE

## 2021-07-27 VITALS
BODY MASS INDEX: 29.32 KG/M2 | HEIGHT: 65 IN | OXYGEN SATURATION: 98 % | WEIGHT: 176 LBS | DIASTOLIC BLOOD PRESSURE: 78 MMHG | SYSTOLIC BLOOD PRESSURE: 128 MMHG | HEART RATE: 71 BPM | RESPIRATION RATE: 16 BRPM

## 2021-07-27 DIAGNOSIS — I10 BENIGN ESSENTIAL HYPERTENSION: ICD-10-CM

## 2021-07-27 DIAGNOSIS — N39.41 URGE INCONTINENCE OF URINE: ICD-10-CM

## 2021-07-27 DIAGNOSIS — G20 PARKINSON'S DISEASE (HCC): ICD-10-CM

## 2021-07-27 DIAGNOSIS — R53.1 GENERALIZED WEAKNESS: ICD-10-CM

## 2021-07-27 DIAGNOSIS — G20 DEMENTIA DUE TO PARKINSON'S DISEASE WITHOUT BEHAVIORAL DISTURBANCE (HCC): Primary | ICD-10-CM

## 2021-07-27 DIAGNOSIS — F02.80 DEMENTIA DUE TO PARKINSON'S DISEASE WITHOUT BEHAVIORAL DISTURBANCE (HCC): Primary | ICD-10-CM

## 2021-07-27 PROCEDURE — 99214 OFFICE O/P EST MOD 30 MIN: CPT | Performed by: FAMILY MEDICINE

## 2021-07-27 PROCEDURE — 1123F ACP DISCUSS/DSCN MKR DOCD: CPT | Performed by: FAMILY MEDICINE

## 2021-07-27 PROCEDURE — 1036F TOBACCO NON-USER: CPT | Performed by: FAMILY MEDICINE

## 2021-07-27 PROCEDURE — G8417 CALC BMI ABV UP PARAM F/U: HCPCS | Performed by: FAMILY MEDICINE

## 2021-07-27 PROCEDURE — 4040F PNEUMOC VAC/ADMIN/RCVD: CPT | Performed by: FAMILY MEDICINE

## 2021-07-27 PROCEDURE — G8427 DOCREV CUR MEDS BY ELIG CLIN: HCPCS | Performed by: FAMILY MEDICINE

## 2021-07-27 NOTE — PROGRESS NOTES
Kathryn Cox (:  1940) is a 80 y.o. male,Established patient, here for evaluation of the following chief complaint(s):  Hypertension (FOLLOW UP ON HTN) and Hyperlipidemia (FOLLOW UP ON CHOLESTEROL)       ASSESSMENT/PLAN:  423    Patient Instructions   Ulysses Duque was seen today for hypertension and hyperlipidemia. Diagnoses and all orders for this visit:    Dementia due to Parkinson's disease without behavioral disturbance ([address]  Slow decline. Parkinson's disease ([address]  Stable. Benign essential hypertension  -     Good control.  -     Continue meds and lifestyle control. Generalized weakness  Continue home exercise. Urge incontinence of urine  Depends. Return in about 6 months (around 2022) for Hypertension, Cholesterol. Subjective   SUBJECTIVE/OBJECTIVE:  Chief Complaint   Patient presents with    Hypertension     FOLLOW UP ON HTN    Hyperlipidemia     FOLLOW UP ON CHOLESTEROL   353  HPI    Dementia due to Parkinson's disease without behavioral disturbance ([address]  Sleeps a lot. All night and takes multiple naps. Not feeling depressed in the past. Can't express self as well now to know. Parkinson's disease [facility])  Sees neurology Dr Ben Manzanares in a couple of weeks. Not a lot of tremor. No real \"off times\". More alert at times than others. Benign essential hypertension  Is checking at home. Range 103-164/ 73 -99. Takes Lisinopril qhs. Generalized weakness  Has fallen once since his visit. Was on way back from the bathroom. No injuries. Urge incontinence of urine  Seen by DR Judson Bernheim and normal CT scan and cystoscopy. Parkinson's felt to be the problem. No new meds. Using myrbetriq and flomax. Told they could do a trial of stopping both. Had been on fish oil and no help. Rhinitis   Has Atrovent but not much help.      Review of Systems   Past Medical History:   Diagnosis Date    Allergic conjunctivitis of both eyes     Anemia     macrocytic    Asthmatic bronchitis     recurrent, REMOVED BY COLD BIOPSY FORCEPS, 6 MM TRANSVERSE COLON POLYP REMOVED BY COLD SNARE POLYPECTOMY, 8 MM DESCENDING COLON POLYP REMOVED BY HOT SNARE POLYPECTOMY. MILD TO MODERATE LEFT SIDED DIVERTICULOSIS. REPEAT IN 3 YRS.    CYSTOSCOPY      CYSTOSCOPY N/A 07/16/2021    NORMAL. No stenosis of urethra from prostate.  EYE SURGERY Right 12/2016    laser to implant lens    EYE SURGERY Left 2015    laser to implant lens    LUMBAR LAMINECTOMY  08/06/2014    microlumbar laminectomy L4 L5    TURP  08/2007    for obstruction    TURP  04/15/2013     & Cystoscopy     WISDOM TOOTH EXTRACTION  1960/1970       Social History     Socioeconomic History    Marital status:      Spouse name: Eric Genao Number of children: 2    Years of education: 23    Highest education level: Not on file   Occupational History    Occupation: RETIRED PSYCHOLOGIST 2009   Tobacco Use    Smoking status: Never Smoker    Smokeless tobacco: Never Used   Vaping Use    Vaping Use: Never used   Substance and Sexual Activity    Alcohol use: Yes     Alcohol/week: 5.8 standard drinks     Types: 7 Standard drinks or equivalent per week     Comment: 1 glass of wine qhs. Has to have at 8 pm or trouble walking.  Drug use: No     Comment: Never tried drugs.  Sexual activity: Not Currently   Other Topics Concern    Not on file   Social History Narrative    Memory issues 2016. Exercise with Nautilus 20 min 3x/wk &  Walks 20 min 2-3x/wk. 1/10/17.  7/12/17. Walking 3 x/wk for 1/4-1/2 mi, using a nautilus machine 1-2x/wk 20-25 min -plans an elliptical machine. 11/8/17. Can't use the elliptical due to Parkinson's. Joined [employer]. Not there yet. 1/16/18. Goes to 2-3x/wk [employer]. 5/9/18. 11/8/18. 3x/wk 30 min. 3/14/19. 4/1/19. Does bike, machines for arms/leg/core body. 6/6/19. Goes to gym 3x/wk for 30 min. 11/20/19. Doing Parkinson's exercises from a video for 10 min. 7/8/20.  Walks mostly to go to the bathroom with 4 prong cane.  Walks around the house. Walks outside. Was doing [employer] and [employer]. 9/3/20. Getting physical therapy at the house. Using a walker. Also getting speech therapy. Has hard time getting up from a chair. 1/26/21. Aide comes out 2x/wk and does PT exercises. 6/4/21     Social Determinants of Health     Financial Resource Strain: Low Risk     Difficulty of Paying Living Expenses: Not hard at all   Food Insecurity: No Food Insecurity    Worried About Running Out of Food in the Last Year: Never true    Jacqueline of Food in the Last Year: Never true   Transportation Needs:     Lack of Transportation (Medical):      Lack of Transportation (Non-Medical):    Physical Activity:     Days of Exercise per Week:     Minutes of Exercise per Session:    Stress:     Feeling of Stress :    Social Connections:     Frequency of Communication with Friends and Family:     Frequency of Social Gatherings with Friends and Family:     Attends Spiritism Services:     Active Member of Clubs or Organizations:     Attends Club or Organization Meetings:     Marital Status:    Intimate Partner Violence:     Fear of Current or Ex-Partner:     Emotionally Abused:     Physically Abused:     Sexually Abused:        Family History   Problem Relation Age of Onset    Diabetes Mother     Hypertension Mother     Stroke Mother 80    Breast Cancer Mother     Heart Surgery Mother         heart valve replacement    Dementia Mother 80    Prostate Cancer Father     Cancer Father     Diabetes Maternal Grandmother     Stroke Maternal Grandfather     Stroke Paternal Grandfather     Other Sister         DDD C & T spine    Scoliosis Sister     Liver Disease Sister         hepatitits    Mental Retardation Brother         birth anoxia    Obesity Brother     Seizures Brother     Parkinsonism Brother 72    Sudden Death Maternal Uncle     Coronary Art Dis Maternal Uncle     Heart Attack Maternal Uncle     Other Son         Benign spacer. Rinse and spit. (Patient not taking: Reported on 7/27/2021) 1 Inhaler 1     No current facility-administered medications for this visit. Objective   Physical Exam  Vitals and nursing note reviewed. Constitutional:       General: He is not in acute distress. Appearance: He is well-developed. He is not diaphoretic. Eyes:      General: No scleral icterus. Right eye: No discharge. Left eye: No discharge. Conjunctiva/sclera: Conjunctivae normal.   Neck:      Thyroid: No thyroid mass or thyromegaly. Vascular: No carotid bruit or JVD. Trachea: Trachea and phonation normal. No tracheal tenderness or tracheal deviation. Cardiovascular:      Rate and Rhythm: Normal rate and regular rhythm. Heart sounds: Normal heart sounds, S1 normal and S2 normal. Heart sounds not distant. No murmur heard. No friction rub. No gallop. No S3 or S4 sounds. Pulmonary:      Effort: Pulmonary effort is normal. No respiratory distress. Breath sounds: Normal breath sounds. No stridor. No decreased breath sounds, wheezing, rhonchi or rales. Musculoskeletal:      Cervical back: Neck supple. Lymphadenopathy:      Head:      Right side of head: No submandibular or tonsillar adenopathy. Left side of head: No submandibular or tonsillar adenopathy. Cervical: No cervical adenopathy. Right cervical: No superficial, deep or posterior cervical adenopathy. Left cervical: No superficial, deep or posterior cervical adenopathy. Skin:     General: Skin is warm and dry. Coloration: Skin is not pale. Neurological:      Mental Status: He is alert. Motor: Weakness (trouble sitting up straight on his own  leans to left) present. Gait: Gait abnormal.      Deep Tendon Reflexes:      Reflex Scores:       Patellar reflexes are 2+ on the right side and 2+ on the left side. Comments: Slow small steps. No cogwheeling in biceps on left.  Unable to avoid helping on

## 2021-07-27 NOTE — PATIENT INSTRUCTIONS
Valerie Nichole was seen today for hypertension and hyperlipidemia. Diagnoses and all orders for this visit:    Dementia due to Parkinson's disease without behavioral disturbance ([facility] [address]  Slow decline. Parkinson's disease ([facility] [address]  Stable. Benign essential hypertension  -     Good control.  -     Continue meds and lifestyle control. Generalized weakness  Continue home exercise. Urge incontinence of urine  Depends.

## 2021-08-10 ENCOUNTER — OFFICE VISIT (OUTPATIENT)
Dept: NEUROLOGY | Age: 81
End: 2021-08-10
Payer: MEDICARE

## 2021-08-10 VITALS
SYSTOLIC BLOOD PRESSURE: 107 MMHG | DIASTOLIC BLOOD PRESSURE: 65 MMHG | HEART RATE: 81 BPM | BODY MASS INDEX: 29.32 KG/M2 | WEIGHT: 176.2 LBS

## 2021-08-10 DIAGNOSIS — R27.0 ATAXIA: ICD-10-CM

## 2021-08-10 DIAGNOSIS — R25.8 BRADYKINESIA: ICD-10-CM

## 2021-08-10 DIAGNOSIS — G25.9 EXTRAPYRAMIDAL SYNDROME: Primary | ICD-10-CM

## 2021-08-10 DIAGNOSIS — D32.9 MENINGIOMA (HCC): ICD-10-CM

## 2021-08-10 DIAGNOSIS — F03.91 DEMENTIA WITH BEHAVIORAL DISTURBANCE, UNSPECIFIED DEMENTIA TYPE: ICD-10-CM

## 2021-08-10 PROCEDURE — 1123F ACP DISCUSS/DSCN MKR DOCD: CPT | Performed by: PSYCHIATRY & NEUROLOGY

## 2021-08-10 PROCEDURE — G8417 CALC BMI ABV UP PARAM F/U: HCPCS | Performed by: PSYCHIATRY & NEUROLOGY

## 2021-08-10 PROCEDURE — 1036F TOBACCO NON-USER: CPT | Performed by: PSYCHIATRY & NEUROLOGY

## 2021-08-10 PROCEDURE — 99214 OFFICE O/P EST MOD 30 MIN: CPT | Performed by: PSYCHIATRY & NEUROLOGY

## 2021-08-10 PROCEDURE — 4040F PNEUMOC VAC/ADMIN/RCVD: CPT | Performed by: PSYCHIATRY & NEUROLOGY

## 2021-08-10 PROCEDURE — G8427 DOCREV CUR MEDS BY ELIG CLIN: HCPCS | Performed by: PSYCHIATRY & NEUROLOGY

## 2021-08-10 NOTE — PROGRESS NOTES
Zachary Duran   Neurology followup    Subjective:   CC/HP  History was obtained from the patient   Additional history was obtained from his wife. Patient has parkinsonian syndrome. Patient's memory and cognitive symptoms seem to be worse. She has episodic urinary incontinence. He also has poor balance. He has not had any falls. His tremors have improved. .   He has increased drooling. No visual hallucinations  Patient's wife also mentioned that he has exhibited some inappropriate sexual behavior. Details of his history are as follows: Bobbi Mueller He has noticed loss of balance. His family has noticed a bland facial expression and his handwriting has become smaller. He has slowness of movement and has difficulty getting up off a low chair or getting out of a car. Patient has had loss of motivation and would rather sit and read or watch TV then get out and do other things. Patient's wife states that he has lost his ability to multitask. For example when he is driving as she could not talk to him to ask a question and his attention would be distracted. REVIEW OF SYSTEMS    Constitutional:  []   Chills   [x]  Fatigue   []  Fevers   []  Malaise   []  Weight loss     [] Denies all of the above    Respiratory:   []  Cough    []  Shortness of breath         [x] Denies all of the above     Cardiovascular:   []  Chest pain    []  Exertional chest pressure/discomfort           [] Palpitations    []  Syncope     [x] Denies all of the above        Past Medical History:   Diagnosis Date    Allergic conjunctivitis of both eyes     Anemia     macrocytic    Asthmatic bronchitis     recurrent, and pneumonia    BBB (bundle branch block)     Right    Benign essential hypertension     BPH (benign prostatic hyperplasia)     Bradycardia 3/29/2015    hospital x 36 hours.  Stopped Verapamil    Colon polyp 2/18/2015    q 3 yr    Colon polyp 03/15/2018    COLONOSCOPY, [facility], [facility], 3 MM ASCENDING COLON POLYP REMOVED BY COLD BIOPSY FORCEPS, 6 MM TRANSVERSE COLON POLYP REMOVED BY COLD SNARE POLYPECTOMY, 8 MM DESCENDING COLON POLYP REMOVED BY HOT SNARE POLYPECTOMY. REPEAT IN 3 YRS.  Coronary atheroma 2006 or 2007    mild plaques -no clinical CAD wkup  for irreg heart beat - testing neg dr. Spencer Older not seen for yrs    DDD (degenerative disc disease), lumbar     L5 - S1    Diverticulosis of colon 03/15/2018    COLONOSCOPY, [facility], [facility] [place] 2], MILD TO MODERATE LEFT SIDED DIVERTICULOSIS. 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- no flare up at this time 8/65/2014    Hearing loss d/t noise     bilateral aids    Impaired fasting glucose     Leukopenia     myelodysplasia/ macrocytic anemia stable 5-6 yrs, fmd monitoring no bone marrow testdon, confirmed with lab work    Low back pain on left side with sciatica 7/34/2712    Metabolic syndrome     Mixed hypertriglyceridemia     Nausea & vomiting     Plantar fasciitis, bilateral 12/1/2012    L>R     Pneumonia     recurrent    Prostatitis 1970s    Tubulovillous adenoma     of colon    Urinary incontinence without sensory awareness - related to BPH     Vitamin D deficiency      Family History   Problem Relation Age of Onset    Diabetes Mother     Hypertension Mother     Stroke Mother 80    Breast Cancer Mother     Heart Surgery Mother         heart valve replacement    Dementia Mother 80    Prostate Cancer Father     Cancer Father     Diabetes Maternal Grandmother     Stroke Maternal Grandfather     Stroke Paternal Grandfather     Other Sister         DDD C & T spine    Scoliosis Sister     Liver Disease Sister         hepatitits    Mental Retardation Brother         birth anoxia    Obesity Brother     Seizures Brother     Parkinsonism Brother 72    Sudden Death Maternal Uncle     Coronary Art Dis Maternal Uncle     Heart Attack Maternal Uncle     Other Son         Benign brain tumor    No Known Problems Daughter      Social History     Socioeconomic History    Marital status:      Spouse name: Sherry Brizuela Number of children: 2    Years of education: 23    Highest education level: Not on file   Occupational History    Occupation: RETIRED PSYCHOLOGIST 2009   Tobacco Use    Smoking status: Never Smoker    Smokeless tobacco: Never Used   Vaping Use    Vaping Use: Never used   Substance and Sexual Activity    Alcohol use: Yes     Alcohol/week: 5.8 standard drinks     Types: 7 Standard drinks or equivalent per week     Comment: 1 glass of wine qhs. Has to have at 8 pm or trouble walking.  Drug use: No     Comment: Never tried drugs.  Sexual activity: Not Currently   Other Topics Concern    Not on file   Social History Narrative    Memory issues 2016. Exercise with Nautilus 20 min 3x/wk &  Walks 20 min 2-3x/wk. 1/10/17.  7/12/17. Walking 3 x/wk for 1/4-1/2 mi, using a nautilus machine 1-2x/wk 20-25 min -plans an elliptical machine. 11/8/17. Can't use the elliptical due to Parkinson's. Joined [employer]. Not there yet. 1/16/18. Goes to 2-3x/wk [employer]. 5/9/18. 11/8/18. 3x/wk 30 min. 3/14/19. 4/1/19. Does bike, machines for arms/leg/core body. 6/6/19. Goes to gym 3x/wk for 30 min. 11/20/19. Doing Parkinson's exercises from a video for 10 min. 7/8/20. Walks mostly to go to the bathroom with 4 prong cane. Walks around the house. Walks outside. Was doing [employer] and [employer]. 9/3/20. Getting physical therapy at the house. Using a walker. Also getting speech therapy. Has hard time getting up from a chair. 1/26/21. Aide comes out 2x/wk and does PT exercises. 6/4/21. 7/2/7/21.      Social Determinants of Health     Financial Resource Strain: Low Risk     Difficulty of Paying Living Expenses: Not hard at all   Food Insecurity: No Food Insecurity    Worried About Running Out of Food in the Last Year: Never true    Ran Out of Food in the Last Year: Never true   Transportation Needs:     Lack of Transportation (Medical):  Lack of Transportation (Non-Medical):    Physical Activity:     Days of Exercise per Week:     Minutes of Exercise per Session:    Stress:     Feeling of Stress :    Social Connections:     Frequency of Communication with Friends and Family:     Frequency of Social Gatherings with Friends and Family:     Attends Evangelical Services:     Active Member of Clubs or Organizations:     Attends Club or Organization Meetings:     Marital Status:    Intimate Partner Violence:     Fear of Current or Ex-Partner:     Emotionally Abused:     Physically Abused:     Sexually Abused:         Objective:  Exam:  /65   Pulse 81   Wt 176 lb 3.2 oz (79.9 kg)   BMI 29.32 kg/m²   This is a well-nourished patient in no acute distress  Patient is awake, alert and oriented x2. Speech is Mildly dysarthric. Poor short-term memory  Pupils are equal round reacting to light. Extraocular movements intact. Face symmetrical. Tongue midline. Motor exam shows normal symmetrical strength. Increased tone. No tremors notedDeep tendon reflexes normal. Plantar reflexes downgoing. Sensory exam normal. Coordination normal. Gait slightly unsteady. No carotid bruit. No neck stiffness.         Data :  LABS:  General Labs:    CBC:   Lab Results   Component Value Date    WBC 4.8 02/05/2021    RBC 4.16 02/05/2021    HGB 14.5 02/05/2021    HCT 43.2 02/05/2021    .9 02/05/2021    MCH 34.8 02/05/2021    MCHC 33.5 02/05/2021    RDW 13.0 02/05/2021     02/05/2021    MPV 8.9 02/05/2021     BMP:    Lab Results   Component Value Date     02/05/2021    K 4.2 02/05/2021     02/05/2021    CO2 28 02/05/2021    BUN 13 02/05/2021    LABALBU 3.9 02/05/2021    CREATININE 0.9 02/05/2021    CALCIUM 8.8 02/05/2021    GFRAA >60 02/05/2021    GFRAA 55 04/16/2013    LABGLOM >60 02/05/2021    GLUCOSE 102 02/05/2021    GLUCOSE 107 08/01/2011   TSH was normal  B12 was at the lower end of normal at 338  RADIOLOGY REVIEW:  I have reviewed radiology  reports(s) of:  MRI brain    Impression :  Extrapyramidal syndrome/parkinsonian syndrome, symptoms are slowly getting worse. Episodes of inappropriate sexual behavior  Dysarthria   Tremor stable   Ataxia stable   Dementia, probable Alzheimer's versus Lewy body dementia  MRI brain showed cortical atrophy  There was an incidental falx meningioma in the left frontal region  B12 level was borderline suspicious for B12 deficiency      Plan :  Discussed with patient   Continue Oral-B 12 supplementation  There is no treatment necessary for the incidental meningioma  Continue Aricept 10 mg at night  Patient has now stopped the Namenda and has not noticed any major changes because of that. Continue Sinemet 25/100, 2 tablets 4 times daily  Return in 6 months  Recommended that he continue using a cane to prevent falls        Please note a portion of  this chart was generated using dragon dictation software. Although every effort was made to ensure the accuracy of this automated transcription, some errors in transcription may have occurred.

## 2021-08-16 ENCOUNTER — TELEPHONE (OUTPATIENT)
Dept: FAMILY MEDICINE CLINIC | Age: 81
End: 2021-08-16

## 2021-08-16 NOTE — TELEPHONE ENCOUNTER
CATHERINE (WIFE)  [phone] - SHE     IS WANTING TO SPEAK TO XIOMY - HE HAS DEMENTIA AND THERE IS AN ISSUE THAT HAS COME UP -- THIS IS ALL SHE WOULD SAY

## 2021-08-16 NOTE — TELEPHONE ENCOUNTER
Called and spokew with pt wife she has talked with dr. Omer Gomez about this and he did not know what this was. She wanted me to route this to dr. Hawa Canada to address when he returns Monday.  Hs

## 2021-08-16 NOTE — TELEPHONE ENCOUNTER
Called and spoke with pt wife. Pt is becoming sexual towards people and was sexual towards the aid today. Pt wife thought there was a patch to help this.     bibi coy

## 2021-08-22 DIAGNOSIS — N39.41 URGE INCONTINENCE OF URINE: ICD-10-CM

## 2021-08-23 ENCOUNTER — TELEPHONE (OUTPATIENT)
Dept: FAMILY MEDICINE CLINIC | Age: 81
End: 2021-08-23

## 2021-08-24 NOTE — TELEPHONE ENCOUNTER
1st thing that was started was he was exposing self to the dog. Then made advances on the home health aide. She is wondering if there is any treatment for this. 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 Sammy Peña to research this OR can see a geriatric psychiatrist OR we can prescribe low-dose estrogen. The downside of low-dose estrogen would be slight muscle weakness. She already looked up the geriatric psychiatrist and there is only one in the not taking new patients. Recommended she discuss this with the insurance as they will often allow people outside the network if there is no one nearby to treat him.

## 2021-08-27 ENCOUNTER — TELEPHONE (OUTPATIENT)
Dept: FAMILY MEDICINE CLINIC | Age: 81
End: 2021-08-27

## 2021-08-27 DIAGNOSIS — F52.8 HYPERSEXUALITY: Primary | ICD-10-CM

## 2021-08-27 DIAGNOSIS — G20 DEMENTIA DUE TO PARKINSON'S DISEASE WITH BEHAVIORAL DISTURBANCE (HCC): ICD-10-CM

## 2021-08-27 DIAGNOSIS — F02.818 DEMENTIA DUE TO PARKINSON'S DISEASE WITH BEHAVIORAL DISTURBANCE (HCC): ICD-10-CM

## 2021-08-27 RX ORDER — ESTRADIOL 0.05 MG/D
1 FILM, EXTENDED RELEASE TRANSDERMAL
Qty: 8 PATCH | Refills: 2 | Status: SHIPPED | OUTPATIENT
Start: 2021-08-30 | End: 2021-10-05

## 2021-08-27 NOTE — TELEPHONE ENCOUNTER
Patient wife spoke with Dr. Angelo Adair and she is calling back because she wants him to order a estrogen patch. Please give her a call back.      [address]> - Phone [phone]

## 2021-08-27 NOTE — TELEPHONE ENCOUNTER
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 She understands and will do a prior Auth if it is not covered but she may be self-pay for the first prescription while awaiting. He is still having problems and had another incident with his home health aide.

## 2021-09-02 DIAGNOSIS — R27.0 ATAXIA: ICD-10-CM

## 2021-09-02 DIAGNOSIS — G25.9 EXTRAPYRAMIDAL SYNDROME: ICD-10-CM

## 2021-09-02 DIAGNOSIS — R25.8 BRADYKINESIA: ICD-10-CM

## 2021-09-03 ENCOUNTER — TELEPHONE (OUTPATIENT)
Dept: FAMILY MEDICINE CLINIC | Age: 81
End: 2021-09-03

## 2021-09-03 DIAGNOSIS — J31.0 GUSTATORY RHINITIS: ICD-10-CM

## 2021-09-03 RX ORDER — IPRATROPIUM BROMIDE 42 UG/1
2 SPRAY, METERED NASAL 3 TIMES DAILY PRN
Qty: 15 ML | Refills: 2 | Status: SHIPPED | OUTPATIENT
Start: 2021-09-03 | End: 2022-09-03

## 2021-09-03 NOTE — TELEPHONE ENCOUNTER
[facility] REQUESTING NEW SCRIPT FOR IPRATROPIUM BROMIDE NASAL SPRAY. SPOKE WITH WIFE. SHE SAID PT STOPPED USING IT FOR A TIME BUT HAS BEEN USING IT AGAIN RECENTLY.     LV 07/27/21 NV 1/27/21

## 2021-09-12 ENCOUNTER — TELEPHONE (OUTPATIENT)
Dept: FAMILY MEDICINE CLINIC | Age: 81
End: 2021-09-12

## 2021-09-12 DIAGNOSIS — R53.1 GENERALIZED WEAKNESS: ICD-10-CM

## 2021-09-12 DIAGNOSIS — G20 DEMENTIA DUE TO PARKINSON'S DISEASE WITH BEHAVIORAL DISTURBANCE (HCC): ICD-10-CM

## 2021-09-12 DIAGNOSIS — F02.818 DEMENTIA DUE TO PARKINSON'S DISEASE WITH BEHAVIORAL DISTURBANCE (HCC): ICD-10-CM

## 2021-09-12 DIAGNOSIS — K21.9 GASTROESOPHAGEAL REFLUX DISEASE WITHOUT ESOPHAGITIS: ICD-10-CM

## 2021-09-12 DIAGNOSIS — G20 PARKINSON'S DISEASE (HCC): ICD-10-CM

## 2021-09-12 DIAGNOSIS — R29.6 RECURRENT FALLS: Primary | ICD-10-CM

## 2021-09-12 NOTE — TELEPHONE ENCOUNTER
Received a letter from [employer] [facility]. They are requesting a prescription for a hospital bed. Lyudmila Keenan was seen today for fall. Diagnoses and all orders for this visit:    Recurrent falls  -     [address] [place] [address]; Hospital bed for use at home due to fall risk getting in and out of bed with Parkinson's. Needs to be able to adjust height of bed getting in and out of need to elevate head of the bed in order to prevent aspiration secondary to GERD and Parkinson's. Generalized weakness  -     Hospital Bed MISC; Hospital bed for use at home due to fall risk getting in and out of bed with Parkinson's. Needs to be able to adjust height of bed getting in and out of need to elevate head of the bed in order to prevent aspiration secondary to GERD and Parkinson's. Gastroesophageal reflux disease without esophagitis  -     Hospital Bed MISC; Hospital bed for use at home due to fall risk getting in and out of bed with Parkinson's. Needs to be able to adjust height of bed getting in and out of need to elevate head of the bed in order to prevent aspiration secondary to GERD and Parkinson's. Dementia due to Parkinson's disease with behavioral disturbance ([facility]; Hospital bed for use at home due to fall risk getting in and out of bed with Parkinson's. Needs to be able to adjust height of bed getting in and out of need to elevate head of the bed in order to prevent aspiration secondary to GERD and Parkinson's. Parkinson's disease [facility])  -     Hospital Bed MISC; Hospital bed for use at home due to fall risk getting in and out of bed with Parkinson's. Needs to be able to adjust height of bed getting in and out of need to elevate head of the bed in order to prevent aspiration secondary to GERD and Parkinson's.

## 2021-09-20 ENCOUNTER — TELEPHONE (OUTPATIENT)
Dept: FAMILY MEDICINE CLINIC | Age: 81
End: 2021-09-20

## 2021-09-20 NOTE — TELEPHONE ENCOUNTER
Patient has the following problems:    Patient Active Problem List   Diagnosis    Benign essential hypertension    Mixed hypertriglyceridemia    Allergic conjunctivitis of both eyes    Impaired fasting glucose    Anemia    GERD (gastroesophageal reflux disease)    BPH (benign prostatic hyperplasia)    Leukopenia    Jeremias's disease    DDD (degenerative disc disease), lumbar    Metabolic syndrome    Fuchs' corneal dystrophy    Asthmatic bronchitis    Tubulovillous adenoma    Elevated homocysteine (HCC)    Vitamin D deficiency    Coronary atheroma    Plantar fasciitis, bilateral    Left-sided low back pain with sciatica    Left hip pain    Colon polyp    Extrapyramidal syndrome (progressive parkinsonian symptoms)    Mild cognitive impairment    Meningioma (HCC)    Bilateral hearing loss    Subjective tinnitus of both ears    Recurrent falls    Urinary incontinence without sensory awareness - related to BPH    Generalized weakness     Patient needs a semielectric hospital bed. Patient requires positioning of the body in ways not possible with ordinary bed. Patient requires elevation of the head of the bed. 30 degrees most of the time due to aspiration with GERD and parkinsonian symptoms. Patient also requires frequent body position changes and immediate need for body position change getting in and out of bed to go to the restroom. He has had recurrent falls in the past.   Rollator walker  Because of progressive generalized weakness and unsteady gait due to parkinsonian symptoms patient needs to take frequent breaks to sit down when walking any significant distance. Without a walker he cannot complete his mobility related activities of daily living within a reasonable time i.e. get to the bathroom.     Frank Boyer,

## 2021-09-27 DIAGNOSIS — L11.1 GROVER'S DISEASE: Chronic | ICD-10-CM

## 2021-09-27 RX ORDER — TRIAMCINOLONE ACETONIDE 1 MG/G
CREAM TOPICAL
Qty: 454 G | Refills: 0 | Status: SHIPPED | OUTPATIENT
Start: 2021-09-27 | End: 2021-11-29

## 2021-09-29 DIAGNOSIS — F03.90 DEMENTIA WITHOUT BEHAVIORAL DISTURBANCE, UNSPECIFIED DEMENTIA TYPE: ICD-10-CM

## 2021-09-29 RX ORDER — DONEPEZIL HYDROCHLORIDE 10 MG/1
TABLET, FILM COATED ORAL
Qty: 90 TABLET | Refills: 1 | Status: SHIPPED | OUTPATIENT
Start: 2021-09-29

## 2021-09-29 RX ORDER — DONEPEZIL HYDROCHLORIDE 10 MG/1
TABLET, FILM COATED ORAL
Qty: 90 TABLET | Refills: 1 | Status: SHIPPED | OUTPATIENT
Start: 2021-09-29 | End: 2021-09-29 | Stop reason: SDUPTHER

## 2021-10-04 DIAGNOSIS — F02.818 DEMENTIA DUE TO PARKINSON'S DISEASE WITH BEHAVIORAL DISTURBANCE (HCC): ICD-10-CM

## 2021-10-04 DIAGNOSIS — G20 DEMENTIA DUE TO PARKINSON'S DISEASE WITH BEHAVIORAL DISTURBANCE (HCC): ICD-10-CM

## 2021-10-04 DIAGNOSIS — F52.8 HYPERSEXUALITY: ICD-10-CM

## 2021-10-05 RX ORDER — ESTRADIOL 0.05 MG/D
FILM, EXTENDED RELEASE TRANSDERMAL
Qty: 26 PATCH | Refills: 0 | Status: SHIPPED | OUTPATIENT
Start: 2021-10-05 | End: 2021-10-07

## 2021-10-07 DIAGNOSIS — G20 DEMENTIA DUE TO PARKINSON'S DISEASE WITH BEHAVIORAL DISTURBANCE (HCC): ICD-10-CM

## 2021-10-07 DIAGNOSIS — F52.8 HYPERSEXUALITY: ICD-10-CM

## 2021-10-07 DIAGNOSIS — F02.818 DEMENTIA DUE TO PARKINSON'S DISEASE WITH BEHAVIORAL DISTURBANCE (HCC): ICD-10-CM

## 2021-10-07 RX ORDER — ESTRADIOL 0.05 MG/D
1 FILM, EXTENDED RELEASE TRANSDERMAL
Qty: 26 PATCH | Refills: 0 | Status: SHIPPED | OUTPATIENT
Start: 2021-10-07 | End: 2022-01-19

## 2021-10-07 NOTE — TELEPHONE ENCOUNTER
SPOKE WITH PHARMACY PATCHES COME IN PACKS OF 8.  24 FOR 30 DAY SUPPLY PHARMACY SAID THEY DID NOT GET THE FIRST SCRIPT SENT ON 8-5-21. REFILL RESENT.   ML

## 2021-10-12 ENCOUNTER — PATIENT MESSAGE (OUTPATIENT)
Dept: FAMILY MEDICINE CLINIC | Age: 81
End: 2021-10-12

## 2021-10-12 DIAGNOSIS — G20 DEMENTIA DUE TO PARKINSON'S DISEASE WITH BEHAVIORAL DISTURBANCE (HCC): ICD-10-CM

## 2021-10-12 DIAGNOSIS — F02.818 DEMENTIA DUE TO PARKINSON'S DISEASE WITH BEHAVIORAL DISTURBANCE (HCC): ICD-10-CM

## 2021-10-12 DIAGNOSIS — N39.41 URGE INCONTINENCE OF URINE: ICD-10-CM

## 2021-10-12 DIAGNOSIS — M51.36 DDD (DEGENERATIVE DISC DISEASE), LUMBAR: ICD-10-CM

## 2021-10-12 DIAGNOSIS — R53.1 GENERALIZED WEAKNESS: ICD-10-CM

## 2021-10-12 DIAGNOSIS — R29.6 RECURRENT FALLS: ICD-10-CM

## 2021-10-12 DIAGNOSIS — G25.9 EXTRAPYRAMIDAL SYNDROME: Primary | ICD-10-CM

## 2021-10-12 NOTE — TELEPHONE ENCOUNTER
From: Destin Randle  To: Danae Bee DO  Sent: 10/12/2021 11:02 AM EDT  Subject: Non-Urgent Medical Question    We need an occupational therapist to come to our home. Daniele Hearn is now unable to get up from a sitting position by himself. I'm hoping a therapist could offer suggestions to help. Thank you.      Carloz Godinez

## 2021-10-14 ENCOUNTER — TELEPHONE (OUTPATIENT)
Dept: FAMILY MEDICINE CLINIC | Age: 81
End: 2021-10-14

## 2021-10-14 NOTE — TELEPHONE ENCOUNTER
CALLED AND SPOKE TO PATIENT WIFE. SCHEDULED PATIENT FOR A VIRTUAL PHONE CALL VISIT TOMORROW AT 3PM WITH DR. Elmo Diaz. WIFE INSTRUCTED TO CONTINUE TO DO THE THINGS ON THE PAPER UNTIL HIS APPT TOMORROW.  SC

## 2021-10-14 NOTE — TELEPHONE ENCOUNTER
Patient calling c/o the following acute symptoms,    Cough:Yes    Bringing up Phlegm:No  Color:none    Sore Throat: No not sure    Post Nasal Drip: Yes    Headache: No not sure    Ear Pain:No  not sure   Ear:none    S.O.B.:No  Wheezing:Yes    Head Congestion:Yes  Blowing his nose a lot    Chest Congestion:Yes    Fever:Yes  If so, highest temp:100.3  this morning     Any medicatin tried for this?  mucus relief, zinc and extra vitamin c. [facility] [place]      Pt started feeling bad on Saturday. Pt wife is following the cold sx paper we give pt. Pt is very weak and blowing his nose a lot. Their aid has a cold also ans waas tested for covid and negative. Pt wife is sure it is just a cold.     What  Kendra Harrington can she try for pt?

## 2021-10-18 ENCOUNTER — PATIENT MESSAGE (OUTPATIENT)
Dept: FAMILY MEDICINE CLINIC | Age: 81
End: 2021-10-18

## 2021-10-18 NOTE — TELEPHONE ENCOUNTER
From: Oralia Hilario  To: Truman Jc DO  Sent: 10/18/2021 2:49 PM EDT  Subject: Non-Urgent Medical Question    Has a-referral been made to the occupational therapist for a home consultation? Anyone is fine who is in our network. Thanks.    Ashtyn Sosa

## 2021-10-19 ENCOUNTER — TELEPHONE (OUTPATIENT)
Dept: FAMILY MEDICINE CLINIC | Age: 81
End: 2021-10-19

## 2021-10-19 DIAGNOSIS — G20 DEMENTIA DUE TO PARKINSON'S DISEASE WITH BEHAVIORAL DISTURBANCE (HCC): ICD-10-CM

## 2021-10-19 DIAGNOSIS — R29.6 RECURRENT FALLS: ICD-10-CM

## 2021-10-19 DIAGNOSIS — K21.9 GASTROESOPHAGEAL REFLUX DISEASE WITHOUT ESOPHAGITIS: ICD-10-CM

## 2021-10-19 DIAGNOSIS — G20 PARKINSON'S DISEASE (HCC): ICD-10-CM

## 2021-10-19 DIAGNOSIS — G31.84 MILD COGNITIVE IMPAIRMENT: ICD-10-CM

## 2021-10-19 DIAGNOSIS — M51.36 DDD (DEGENERATIVE DISC DISEASE), LUMBAR: ICD-10-CM

## 2021-10-19 DIAGNOSIS — F02.818 DEMENTIA DUE TO PARKINSON'S DISEASE WITH BEHAVIORAL DISTURBANCE (HCC): ICD-10-CM

## 2021-10-19 DIAGNOSIS — R53.1 GENERALIZED WEAKNESS: ICD-10-CM

## 2021-10-19 NOTE — TELEPHONE ENCOUNTER
Has home health aide coming in. No therapies from any other home health group. He has now not able to get up out of a chair. They need assistance from occupational therapy to find out what they can do to help keep him at home. The wife cannot help lift him because she has a bad neck. Patient's spouse wants information released to [employer] [facility] for all his diagnosis. He went from passport to [place].

## 2021-10-19 NOTE — TELEPHONE ENCOUNTER
Pt now needs his supplies through [facility] pharmacy. His insurance changed for his supplies. She used to use "[employer] for his supplies. 1.  Pull ups  2. Wipes  3.  Disposable bed pads    We can fax to [phone]  [facility]

## 2021-10-20 NOTE — TELEPHONE ENCOUNTER
CALLED AND SPOKE TO PATIENT WIFE, SHE STATES THAT HE NEEDS THE LARGE PULL UPS, AND DISPOSABLE BED PADS. WHEN I ASKED HER HOW MANY SHE THINKS HE NEEDS FOR A MONTH, SHE WA UNABLE TO TELL ME. SHE STATES THAT SHE HAD BEEN GETTING 3 PACKS OF THE PULL UPS AT A TIME BUT THAT WAS ONLY LASTING FOR A COUPLE WEEKS. I GOT THE FAX NUMBER BUT COULD NOT FIND THE INFORMATION AS TO WHICH PHARMACY TO PLACE IN CHART. SC    HE NEEDS THE LARGE SIZE PULL UPS  LARGE BED PADS,  OVERNIGHT PULL UPS IF POSSIBLE. [facility] [phone] IS THE FAX NUMBER.  SC

## 2021-10-20 NOTE — TELEPHONE ENCOUNTER
1) there is a [facility] on [address]. Which pharmacy do they want? (Please put the correct pharmacy in the pharmacy list.)  2) do they need supplies now or is this information for later? If they need supplies please get exact numbers per month for each supply.

## 2021-10-25 NOTE — TELEPHONE ENCOUNTER
CALLED AND SPOKE TO PATIENT WIFE AND SHE SAID SHE GOT THE INCONTINENCE SUPPLIES TAKEN CARE OF THROUGH HIS . STILL NEEDED ORDER FOR OT.  SC

## 2021-10-27 ENCOUNTER — TELEPHONE (OUTPATIENT)
Dept: FAMILY MEDICINE CLINIC | Age: 81
End: 2021-10-27

## 2021-10-27 NOTE — TELEPHONE ENCOUNTER
SPOKE TO DR. Jovanna Bran. HE SAID PT WOULD BE THE BETTER OPTION FOR THIS PARTICULAR CIRCUMSTANCE. UPDATED FORM FROM YESTERDAY AND REFAXED THIS.  SC

## 2021-10-27 NOTE — TELEPHONE ENCOUNTER
[facility] CALLING YOU NEED TO ADD - EITHER NURSING OR PT - SO THEY CAN SEND SOMEONE TO SEE HIM    PATO @  [phone]    FAX:  [phone] OR PUT IN Epic - IF YOU PUT IN Epic CALL HIM SO HE KNOWS TO LOOK FOR IT

## 2021-11-02 ENCOUNTER — TELEPHONE (OUTPATIENT)
Dept: FAMILY MEDICINE CLINIC | Age: 81
End: 2021-11-02

## 2021-11-02 NOTE — TELEPHONE ENCOUNTER
[facility] called and said they need a verbal order stating its ok for them to come out and assist the patient with physical and occupational therapy     Please call

## 2021-11-22 ENCOUNTER — TELEPHONE (OUTPATIENT)
Dept: FAMILY MEDICINE CLINIC | Age: 81
End: 2021-11-22

## 2021-11-22 RX ORDER — OMEGA-3S/DHA/EPA/FISH OIL 1000-1400
2 CAPSULE,DELAYED RELEASE (ENTERIC COATED) ORAL DAILY
COMMUNITY

## 2021-11-22 RX ORDER — SELENIUM 50 MCG
1 TABLET ORAL DAILY
COMMUNITY

## 2021-11-22 NOTE — TELEPHONE ENCOUNTER
Home meds correction    Current Outpatient Medications on File Prior to Visit   Medication Sig Dispense Refill    estradiol (VIVELLE) 0.05 MG/24HR Place 1 patch onto the skin Twice a Week 26 patch 0    donepezil (ARICEPT) 10 MG tablet TAKE 1 TABLET EVERY NIGHT 90 tablet 1    triamcinolone (KENALOG) 0.1 % cream APPLY TOPICALLY TO THE AFFECTED AREA(S) TWICE DAILY AS DIRECTED 454 g 0   350 [facility] bed for use at home due to fall risk getting in and out of bed with Parkinson's. Needs to be able to adjust height of bed getting in and out of need to elevate head of the bed in order to prevent aspiration secondary to GERD and Parkinson's. 1 each 0    ipratropium (ATROVENT) 0.06 % nasal spray 2 sprays by Nasal route 3 times daily as needed for Rhinitis 15 mL 2    carbidopa-levodopa (SINEMET)  MG per tablet TAKE 2 TABLETS  4 TIMES DAILY 720 tablet 1    mirabegron (MYRBETRIQ) 50 MG TB24 TAKE 1 TABLET EVERY MORNING 90 tablet 0    lisinopril (PRINIVIL;ZESTRIL) 40 MG tablet TAKE 1 TABLET DAILY FOR HIGH BLOOD PRESSURE 90 tablet 1    Misc. Devices (ROLLER [place] [place]) MISC Use whenever walking. . 1 each 0    Cyanocobalamin (B-12) 1000 MCG TABS Take 1 tablet by mouth daily 30 tablet 11    Cholecalciferol (VITAMIN D3) 25 MCG (1000 UT) CAPS Take 1 capsule by mouth daily 30 capsule 11    folic acid (FOLVITE) 157 MCG tablet Take 1 tablet by mouth daily Indications: Increased Amount of Homocysteine in the Blood 30 tablet 11    Omega-3 1000 MG CAPS Take 1 capsule by mouth 2 times daily 60 capsule 11    ascorbic acid (VITAMIN C) 500 MG tablet Take 1 tablet by mouth daily 30 tablet 11    tamsulosin (FLOMAX) 0.4 MG capsule Take 0.4 mg by mouth daily Indications: Enlarged Prostate with Urination Problems, urinary urgency and incontinence (uses Depends)       simvastatin (ZOCOR) 10 MG tablet TAKE 1 TABLET EVERY NIGHT 90 tablet 3    aspirin 81 MG EC tablet Take 81 mg by mouth daily Indications: Cardiovascular Risk Reduction       clobetasol (TEMOVATE) 0.05 % cream APPLY TOPICALLY TWICE DAILY AS DIRECTED (Patient not taking: Reported on 11/22/2021) 15 g 2    budesonide-formoterol (SYMBICORT) 160-4.5 MCG/ACT AERO Inhale 2 puffs into the lungs 2 times daily Use spacer. Rinse and spit. (Patient not taking: Reported on 11/22/2021) 1 Inhaler 1     No current facility-administered medications on file prior to visit. Current Outpatient Medications   Medication Sig Dispense Refill    estradiol (VIVELLE) 0.05 MG/24HR Place 1 patch onto the skin Twice a Week 26 patch 0    donepezil (ARICEPT) 10 MG tablet TAKE 1 TABLET EVERY NIGHT 90 tablet 1    triamcinolone (KENALOG) 0.1 % cream APPLY TOPICALLY TO THE AFFECTED AREA(S) TWICE DAILY AS DIRECTED 454 g 0   350 [facility] bed for use at home due to fall risk getting in and out of bed with Parkinson's. Needs to be able to adjust height of bed getting in and out of need to elevate head of the bed in order to prevent aspiration secondary to GERD and Parkinson's. 1 each 0    ipratropium (ATROVENT) 0.06 % nasal spray 2 sprays by Nasal route 3 times daily as needed for Rhinitis 15 mL 2    carbidopa-levodopa (SINEMET)  MG per tablet TAKE 2 TABLETS  4 TIMES DAILY 720 tablet 1    mirabegron (MYRBETRIQ) 50 MG TB24 TAKE 1 TABLET EVERY MORNING 90 tablet 0    lisinopril (PRINIVIL;ZESTRIL) 40 MG tablet TAKE 1 TABLET DAILY FOR HIGH BLOOD PRESSURE 90 tablet 1    Misc. Devices (ROLLER [employer]) MISC Use whenever walking. . 1 each 0    Cyanocobalamin (B-12) 1000 MCG TABS Take 1 tablet by mouth daily 30 tablet 11    Cholecalciferol (VITAMIN D3) 25 MCG (1000 UT) CAPS Take 1 capsule by mouth daily 30 capsule 11    folic acid (FOLVITE) 769 MCG tablet Take 1 tablet by mouth daily Indications: Increased Amount of Homocysteine in the Blood 30 tablet 11    Omega-3 1000 MG CAPS Take 1 capsule by mouth 2 times daily 60 capsule 11    ascorbic acid (VITAMIN C) 500 MG tablet Take 1 tablet by mouth daily 30 tablet 11    tamsulosin (FLOMAX) 0.4 MG capsule Take 0.4 mg by mouth daily Indications: Enlarged Prostate with Urination Problems, urinary urgency and incontinence (uses Depends)       simvastatin (ZOCOR) 10 MG tablet TAKE 1 TABLET EVERY NIGHT 90 tablet 3    aspirin 81 MG EC tablet Take 81 mg by mouth daily Indications: Cardiovascular Risk Reduction       clobetasol (TEMOVATE) 0.05 % cream APPLY TOPICALLY TWICE DAILY AS DIRECTED (Patient not taking: Reported on 11/22/2021) 15 g 2    budesonide-formoterol (SYMBICORT) 160-4.5 MCG/ACT AERO Inhale 2 puffs into the lungs 2 times daily Use spacer. Rinse and spit. (Patient not taking: Reported on 11/22/2021) 1 Inhaler 1     No current facility-administered medications for this visit.

## 2021-11-23 ENCOUNTER — IMMUNIZATION (OUTPATIENT)
Dept: FAMILY MEDICINE CLINIC | Age: 81
End: 2021-11-23
Payer: MEDICARE

## 2021-11-23 DIAGNOSIS — Z23 NEED FOR IMMUNIZATION AGAINST INFLUENZA: Primary | ICD-10-CM

## 2021-11-23 PROCEDURE — G0008 ADMIN INFLUENZA VIRUS VAC: HCPCS | Performed by: FAMILY MEDICINE

## 2021-11-23 PROCEDURE — 90694 VACC AIIV4 NO PRSRV 0.5ML IM: CPT | Performed by: FAMILY MEDICINE

## 2021-11-23 NOTE — PROGRESS NOTES
Vaccine Information Sheet, \"Influenza - Inactivated\"  given to Julius Coe, or parent/legal guardian of  Julius Coe and verbalized understanding. Patient responses:    Have you ever had a reaction to a flu vaccine? No  Do you have any current illness? No  Have you ever had Guillian [place] Syndrome? No  Do you have a serious allergy to any of the follow: Neomycin, Polymyxin, Thimerosal, eggs or egg products? No    Flu vaccine given per order. Please see immunization tab. Risks and benefits explained. Current VIS given.       Immunizations Administered     Name Date Dose Route    Influenza, Quadv, adjuvanted, 65 yrs +, IM, PF (Fluad) 11/23/2021 0.5 mL Intramuscular    Site: Deltoid- Left    Lot: [id number]    NDC: 00954-502-53

## 2021-11-26 DIAGNOSIS — L11.1 GROVER'S DISEASE: Chronic | ICD-10-CM

## 2021-11-29 ENCOUNTER — PATIENT MESSAGE (OUTPATIENT)
Dept: FAMILY MEDICINE CLINIC | Age: 81
End: 2021-11-29

## 2021-11-29 DIAGNOSIS — R13.10 DYSPHAGIA, UNSPECIFIED TYPE: Primary | ICD-10-CM

## 2021-11-29 RX ORDER — TRIAMCINOLONE ACETONIDE 1 MG/G
CREAM TOPICAL
Qty: 454 G | Refills: 0 | Status: SHIPPED | OUTPATIENT
Start: 2021-11-29 | End: 2021-12-22 | Stop reason: DRUGHIGH

## 2021-11-29 NOTE — TELEPHONE ENCOUNTER
From: Wesley Parra  To: Dr. Quick Reyna: 11/29/2021 10:25 AM EST  Subject: Non-Urgent Medical Question    Lalit Bradshaw needs a referral to a speech pathologist for a swallowing evaluation. He is coughing and clearing his throat a lot. He is requiring 2 hours sometimes to complete a meal.   He has worked with [employer] in the past.   Thank you.    Ron Harry

## 2021-11-30 NOTE — TELEPHONE ENCOUNTER
From: Ajith Tineo  To: Dr. Liana Wilson Hiss: 11/29/2021 10:23 PM EST  Subject: Non-Urgent Medical Question    Thank you for your suggestion for the barium test. Will you send a request to [facility]?

## 2021-12-06 ENCOUNTER — HOSPITAL ENCOUNTER (OUTPATIENT)
Dept: GENERAL RADIOLOGY | Age: 81
Discharge: HOME | End: 2021-12-06
Payer: MEDICARE

## 2021-12-06 DIAGNOSIS — R13.10 DYSPHAGIA, UNSPECIFIED TYPE: ICD-10-CM

## 2021-12-06 PROCEDURE — 92611 MOTION FLUOROSCOPY/SWALLOW: CPT

## 2021-12-06 PROCEDURE — 92526 ORAL FUNCTION THERAPY: CPT

## 2021-12-06 PROCEDURE — 74230 X-RAY XM SWLNG FUNCJ C+: CPT

## 2021-12-06 NOTE — PROGRESS NOTES
Speech Language Pathology    [facility] SPEECH THERAPY  MODIFIED BARIUM SWALLOW EVALUATION    Patient's Name: Vivian Crenshaw. O.B: 1940  Medical Diagnosis: Dysphagia, unspecified type [R13.10]  Treatment Diagnosis: Dysphagia    Ordering MD: Dr. Wade Perez  Radiologist: Dr. Shyla Mina  Date of Onset: 11/30/21  Date of Evaluation: 12/6/2021  Type of Study: Modified Barium Swallowing Study (MBS)  Diet Prior to Study: regular diet with thin liquids  Pain Level: Pt denies pain at this time    Impression:  Modified Barium Swallow evaluation completed on 12/6/2021. Results indicate moderate oropharyngeal dysphagia with intermittent deep SILENT laryngeal penetration and one episode of suspected trace aspiration noted with thin liquids. Deep laryngeal penetration intermittently noted with thin liquids given in combination with solid textures. Pt initially tolerated thin liquids in isolation with no overt laryngeal penetration or aspiration directly viewed. However, mechanism fatigue is suspected over successive po trials with trace laryngeal penetration also noted with thin liquids in isolation at the end of this study. Although no overt and consistent aspiration with thin liquids was directly viewed, Pt is at increased risk for episodic aspiration of thin liquids given in combination with other textures due to premature bolus loss to pharynx with delayed/reduced airway protection during the swallow with thin liquids. Moderately reduced bolus control and A-P propulsion noted with all textures. Anterior \"munching\" pattern with prolonged A-P propulsion and delayed oral clearing noted with solids. Premature bolus loss to pharynx with pharyngeal pooling and progressive delays in swallow initiation noted as textures increase. Reduced airway protection during the swallow and delayed pharyngeal clearing after the swallow also increases aspiration risk and potential if precautions are not followed.   Education regarding results of MBS as well as aspiration risk and recommendations explained to the Pt and his wife following this study. Outpatient Speech Therapy for Speech and Dysphagia Treatment are recommended. Pt's wife verbalized understanding and agreement with recommendations. Aspiration/Penetration Risk:  Moderate risk with thin liquids given in combination with solid textures    Recommendations:    Diet Level: Soft and bite sized diet with thin liquids/no straws/Avoid mixed consistencies/meds (crushed or whole) with puree. Referral: Outpatient Speech Therapy for Speech and Dysphagia Treatment    Strategies:  Upright as possible with all PO intake , No straws , Eat/feed slowly, Remain upright 30-45 min      Treatments: Speech Therapy for dysphagia treatment with frequency and duration as indicated per Outpatient SLP  Goals:  1. Pt will functionally tolerate recommended diet level without s/s of aspiration/penetration   2. Pt/family will demonstrate understanding of results Modified Barium Swallow Study, risk for aspiration, rationale for diet level and compensatory strategies   3. Pt will utilize appropriate compensatory strategies as indicated with min with cues   4. Pt will demonstrate improved sensory motor function via targeted exercises and appropriate treatment modalities     Consistencies given: Thin, Mildly Thick (Nectar) Liquids, Moderately Thick (honey) Liquids, Puree, Soft solid, Solid    Oral Phase   -Reduced bolus control  -Piecemeal and premature bolus loss to pharynx  -Reduced mastication  -Reduced A-P bolus propulsion; Anterior munch pattern with solids; Oral stasis    Pharyngeal Phase  -Delayed swallow onset;  Increased delays with increased textures (ie 14 sec delay with solids)  -Pooling to the valleculae and progressively to the underside of epiglottis   -Reduced tongue based retraction  -reduced epiglottic inversion  -Reduced hyolaryngeal elevation; reduced thyrohyoid approximation  -Deep SILENT laryngeal penetration with thin liquids  -suspected deep laryngeal penetration/trace aspiration with thin liquids given as a rinse in combination with solids; Delayed cough noted only with deep laryngeal penetration/aspiration    Esophageal Phase  Unremarkable    Following Evaluation:  Results/recommendations and education given to Patient, who verbalized understanding    Timed Code Treatment: 0 min    Total Treatment Time: 60 min    Ramy [id number]-FXO#[id number]   Speech-Language Pathologist

## 2021-12-06 NOTE — PROCEDURES
Speech Language Pathology    [facility] SPEECH THERAPY  MODIFIED BARIUM SWALLOW EVALUATION    Patient's Name: Jane Abraham. O.B: 1940  Medical Diagnosis: Dysphagia, unspecified type [R13.10]  Treatment Diagnosis: Dysphagia    Ordering MD: Dr. Flor Lockhart  Radiologist: Dr. Jese Tejada  Date of Onset: 11/30/21  Date of Evaluation: 12/6/2021  Type of Study: Modified Barium Swallowing Study (MBS)  Diet Prior to Study: regular diet with thin liquids  Pain Level: Pt denies pain at this time    Impression:  Modified Barium Swallow evaluation completed on 12/6/2021. Results indicate moderate oropharyngeal dysphagia with intermittent deep SILENT laryngeal penetration and one episode of suspected trace aspiration noted with thin liquids. Deep laryngeal penetration intermittently noted with thin liquids given in combination with solid textures. Pt initially tolerated thin liquids in isolation with no overt laryngeal penetration or aspiration directly viewed. However, mechanism fatigue is suspected over successive po trials with trace laryngeal penetration also noted with thin liquids in isolation at the end of this study. Although no overt and consistent aspiration with thin liquids was directly viewed, Pt is at increased risk for episodic aspiration of thin liquids given in combination with other textures due to premature bolus loss to pharynx with delayed/reduced airway protection during the swallow with thin liquids. Moderately reduced bolus control and A-P propulsion noted with all textures. Anterior \"munching\" pattern with prolonged A-P propulsion and delayed oral clearing noted with solids. Premature bolus loss to pharynx with pharyngeal pooling and progressive delays in swallow initiation noted as textures increase. Reduced airway protection during the swallow and delayed pharyngeal clearing after the swallow also increases aspiration risk and potential if precautions are not followed.   Education regarding results of [facility] [place] well as aspiration risk and recommendations explained to the Pt and his wife following this study. Outpatient Speech Therapy for Speech and Dysphagia Treatment are recommended. Pt's wife verbalized understanding and agreement with recommendations. Aspiration/Penetration Risk:  Moderate risk with thin liquids given in combination with solid textures    Recommendations:    Diet Level: Soft and bite sized diet with thin liquids/no straws/Avoid mixed consistencies/meds (crushed or whole) with puree. Referral: Outpatient Speech Therapy for Speech and Dysphagia Treatment    Strategies:  Upright as possible with all PO intake , No straws , Eat/feed slowly, Remain upright 30-45 min      Treatments: Speech Therapy for dysphagia treatment with frequency and duration as indicated per Outpatient SLP  Goals:  1. Pt will functionally tolerate recommended diet level without s/s of aspiration/penetration   2. Pt/family will demonstrate understanding of results Modified Barium Swallow Study, risk for aspiration, rationale for diet level and compensatory strategies   3. Pt will utilize appropriate compensatory strategies as indicated with min with cues   4. Pt will demonstrate improved sensory motor function via targeted exercises and appropriate treatment modalities     Consistencies given: Thin, Mildly Thick (Nectar) Liquids, Moderately Thick (honey) Liquids, Puree, Soft solid, Solid    Oral Phase   -Reduced bolus control  -Piecemeal and premature bolus loss to pharynx  -Reduced mastication  -Reduced A-P bolus propulsion; Anterior munch pattern with solids; Oral stasis    Pharyngeal Phase  -Delayed swallow onset;  Increased delays with increased textures (ie 14 sec delay with solids)  -Pooling to the valleculae and progressively to the underside of epiglottis   -Reduced tongue based retraction  -reduced epiglottic inversion  -Reduced hyolaryngeal elevation; reduced thyrohyoid approximation  -Deep SILENT laryngeal penetration with thin liquids  -suspected deep laryngeal penetration/trace aspiration with thin liquids given as a rinse in combination with solids; Delayed cough noted only with deep laryngeal penetration/aspiration    Esophageal Phase  Unremarkable    Following Evaluation:  Results/recommendations and education given to Patient, who verbalized understanding    Timed Code Treatment: 0 min    Total Treatment Time: 60 min    Major Morning NATHANIEL#[id number]   Speech-Language Pathologist

## 2021-12-09 DIAGNOSIS — G25.9 EXTRAPYRAMIDAL SYNDROME: ICD-10-CM

## 2021-12-09 DIAGNOSIS — R13.12 OROPHARYNGEAL DYSPHAGIA: Primary | ICD-10-CM

## 2021-12-09 NOTE — Clinical Note
Fax home health order with modified barium swallow and speech therapy note (latter found under notes) and patient summary from miscellaneous category of chart review.

## 2021-12-10 ENCOUNTER — OFFICE VISIT (OUTPATIENT)
Dept: FAMILY MEDICINE CLINIC | Age: 81
End: 2021-12-10
Payer: MEDICAID

## 2021-12-10 VITALS
OXYGEN SATURATION: 96 % | BODY MASS INDEX: 27.99 KG/M2 | SYSTOLIC BLOOD PRESSURE: 134 MMHG | HEIGHT: 65 IN | HEART RATE: 67 BPM | DIASTOLIC BLOOD PRESSURE: 82 MMHG | WEIGHT: 168 LBS | RESPIRATION RATE: 18 BRPM

## 2021-12-10 DIAGNOSIS — L11.1 GROVER'S DISEASE: ICD-10-CM

## 2021-12-10 DIAGNOSIS — K59.01 SLOW TRANSIT CONSTIPATION: ICD-10-CM

## 2021-12-10 DIAGNOSIS — L25.9 CONTACT DERMATITIS, UNSPECIFIED CONTACT DERMATITIS TYPE, UNSPECIFIED TRIGGER: Primary | ICD-10-CM

## 2021-12-10 PROCEDURE — G8484 FLU IMMUNIZE NO ADMIN: HCPCS | Performed by: NURSE PRACTITIONER

## 2021-12-10 PROCEDURE — 1123F ACP DISCUSS/DSCN MKR DOCD: CPT | Performed by: NURSE PRACTITIONER

## 2021-12-10 PROCEDURE — 4040F PNEUMOC VAC/ADMIN/RCVD: CPT | Performed by: NURSE PRACTITIONER

## 2021-12-10 PROCEDURE — G8427 DOCREV CUR MEDS BY ELIG CLIN: HCPCS | Performed by: NURSE PRACTITIONER

## 2021-12-10 PROCEDURE — G8417 CALC BMI ABV UP PARAM F/U: HCPCS | Performed by: NURSE PRACTITIONER

## 2021-12-10 PROCEDURE — 1036F TOBACCO NON-USER: CPT | Performed by: NURSE PRACTITIONER

## 2021-12-10 PROCEDURE — 99214 OFFICE O/P EST MOD 30 MIN: CPT | Performed by: NURSE PRACTITIONER

## 2021-12-10 RX ORDER — TRIAMCINOLONE ACETONIDE 5 MG/G
CREAM TOPICAL
Qty: 454 G | Refills: 5 | Status: SHIPPED | OUTPATIENT
Start: 2021-12-10 | End: 2021-12-22 | Stop reason: SDUPTHER

## 2021-12-10 ASSESSMENT — ENCOUNTER SYMPTOMS
SINUS PAIN: 0
EYE DISCHARGE: 0
SINUS PRESSURE: 0
ABDOMINAL PAIN: 0
CONSTIPATION: 1
CHEST TIGHTNESS: 0
DIARRHEA: 0
ABDOMINAL DISTENTION: 0
BACK PAIN: 0
COLOR CHANGE: 0
NAUSEA: 0
SHORTNESS OF BREATH: 0
COUGH: 0

## 2021-12-10 NOTE — PATIENT INSTRUCTIONS
Aquaphor to skin nightly before bed. Steroid cream in morning after shower. Constipation recommendations- miralax over the counter 1/2 cap to 1 cap full daily. Colace (docusate) 100 mg daily. GENERAL OFFICE POLICIES      Telephone Calls: Messages will be answered within 1-2 business days, unless the provider is out of the office. If it is urgent a covering provider will answer. (this does not include Medication refills). MyChart: We recommend all patients sign up for [employer]. Through this portal you can see your lab results, request refills, schedule appointments, pay your bill and send messages to the office. [employer] messages will be answered within 1-2 business days unless the provider is out of the office. For urgent matters, please call the office. Appointments:  All appointments must be scheduled. We ask all patients to schedule their next follow up appointment before they leave the office to make sure you will be able to be seen before you run out of medications. 24 hours notice is required to cancel or reschedule an appointment to avoid being marked as a no show. You may be dismissed from the practice after 3 no shows. LATE for Appointment: If you are 15 or more minutes late for your appointment, you may be asked to reschedule. MA/LAB APPTS: Must be scheduled, cannot accept walk in lab visits. We only draw labs for patients established in our office. We only do injections for medications ordered by our office. Acute Sick Visits:  Nothing other than acute complaint will be addressed at this visit. TRADITIONAL MEDICARE  DOES NOT COVER PHYSICALS  MEDICARE WELLNESS VISITS: These are NOT physicals but the free annual visit offered by Medicare to discuss wellness issues. Medication refills, checkups, etc. will not be addressed during this visit.   Medication Refills: Refills are handled electronically so please contact your pharmacy for medication refills even if current refills have been exhausted. If you are on a controlled medication you will be referred to a specialist (pain specialist, psychiatry, etc). Forms: There is a $35 fee to fill out FMLA/Disability paperwork, payable at time of . Instead of the fee, you can choose to have the paperwork filled out during a separate office visit that is for filling out the paperwork only. Medication Samples: This office does not carry medication samples. If you need assistance in getting your medications, then please let the medical assistant know so they can help you sign up for a drug assistance program that can help get medications at a reduced cost or even free (if you qualify). Workman's Comp Claims: We do not handle workman's comp cases or claims. You will need to go to an urgent care to be seen or to whomever your employer uses. General - Any abusive/rude behavior toward staff/providers may be cause for dismissal.  Patient Education        Dermatitis: Care Instructions  Your Care Instructions  Dermatitis is the general name used for any rash or inflammation of the skin. Different kinds of dermatitis cause different kinds of rashes. Common causes of a rash include new medicines, plants (such as poison oak or poison ivy), heat, and stress. Certain illnesses can also cause a rash. An allergic reaction to something that touches your skin, such as latex, nickel, or poison ivy, is called contact dermatitis. Contact dermatitis may also be caused by something that irritates the skin, such as bleach, a chemical, or soap. These types of rashes cannot be spread from person to person. How long your rash will last depends on what caused it. Rashes may last a few days or months. Follow-up care is a key part of your treatment and safety. Be sure to make and go to all appointments, and call your doctor if you are having problems. It's also a good idea to know your test results and keep a list of the medicines you take.   How can you care for yourself at home? · Do not scratch the rash. Cut your nails short, and file them smooth. Or wear gloves if this helps keep you from scratching. · Wash the area with water only. Pat dry. · Put cold, wet cloths on the rash to reduce itching. · Keep cool, and stay out of the sun. · Leave the rash open to the air as much as possible. · If the rash itches, use hydrocortisone cream. Follow the directions on the label. Calamine lotion may help for plant rashes. · Take an over-the-counter antihistamine, such as diphenhydramine (Benadryl) or loratadine (Claritin), to help calm the itching. Read and follow all instructions on the label. · If your doctor prescribed a cream, use it as directed. If your doctor prescribed medicine, take it exactly as directed. When should you call for help? Call your doctor now or seek immediate medical care if:    · You have symptoms of infection, such as:  ? Increased pain, swelling, warmth, or redness. ? Red streaks leading from the area. ? Pus draining from the area. ? A fever.     · You have joint pain along with the rash. Watch closely for changes in your health, and be sure to contact your doctor if:    · Your rash is changing or getting worse.     · You are not getting better as expected. Where can you learn more? Go to https://Opternativepepiceweb.BioDtech. org and sign in to your [employer] account. Enter [phone] in the [facility] box to learn more about \"Dermatitis: Care Instructions. \"     If you do not have an account, please click on the \"Sign Up Now\" link. Current as of: March 3, 2021               Content Version: 13.0  © 9350-3639 Healthwise, [employer]. Care instructions adapted under license by [facility] ([facility]). If you have questions about a medical condition or this instruction, always ask your healthcare professional. [address] any warranty or liability for your use of this information.          Patient Education Constipation: Care Instructions  Your Care Instructions     Constipation means that you have a hard time passing stools (bowel movements). People pass stools from 3 times a day to once every 3 days. What is normal for you may be different. Constipation may occur with pain in the rectum and cramping. The pain may get worse when you try to pass stools. Sometimes there are small amounts of bright red blood on toilet paper or the surface of stools. This is because of enlarged veins near the rectum (hemorrhoids). A few changes in your diet and lifestyle may help you avoid ongoing constipation. Your doctor may also prescribe medicine to help loosen your stool. Some medicines can cause constipation. These include pain medicines and antidepressants. Tell your doctor about all the medicines you take. Your doctor may want to make a medicine change to ease your symptoms. Follow-up care is a key part of your treatment and safety. Be sure to make and go to all appointments, and call your doctor if you are having problems. It's also a good idea to know your test results and keep a list of the medicines you take. How can you care for yourself at home? · Drink plenty of fluids. If you have kidney, heart, or liver disease and have to limit fluids, talk with your doctor before you increase the amount of fluids you drink. · Include high-fiber foods in your diet each day. These include fruits, vegetables, beans, and whole grains. · Get at least 30 minutes of exercise on most days of the week. Walking is a good choice. You also may want to do other activities, such as running, swimming, cycling, or playing tennis or team sports. · Take a fiber supplement, such as Citrucel or Metamucil, every day. Read and follow all instructions on the label. · Schedule time each day for a bowel movement. A daily routine may help. Take your time having your bowel movement.   · Support your feet with a small step stool when you sit on the toilet. This helps flex your hips and places your pelvis in a squatting position. · Your doctor may recommend an over-the-counter laxative to relieve your constipation. Examples are Milk of Magnesia and MiraLax. Read and follow all instructions on the label. Do not use laxatives on a long-term basis. When should you call for help? Call your doctor now or seek immediate medical care if:    · You have new or worse belly pain.     · You have new or worse nausea or vomiting.     · You have blood in your stools. Watch closely for changes in your health, and be sure to contact your doctor if:    · Your constipation is getting worse.     · You do not get better as expected. Where can you learn more? Go to https://HolairapeUSERJOY Technology.[employer]ent [email or web address] org and sign in to your [employer] account. Enter [phone] in the [employer] box to learn more about \"Constipation: Care Instructions. \"     If you do not have an account, please click on the \"Sign Up Now\" link. Current as of: July 1, 2021               Content Version: 13.0  © 4738-6525 Healthwise, Incorporated. Care instructions adapted under license by [facility] ([facility]). If you have questions about a medical condition or this instruction, always ask your healthcare professional. [address]  any warranty or liability for your use of this information.

## 2021-12-10 NOTE — PROGRESS NOTES
urinating, dysuria, flank pain, frequency and urgency. Musculoskeletal: Negative for arthralgias, back pain, gait problem, joint swelling, myalgias and neck pain. Skin: Positive for rash. Negative for color change and wound. Allergic/Immunologic: Negative for food allergies and immunocompromised state. Neurological: Negative for dizziness, tremors, speech difficulty, weakness, light-headedness, numbness and headaches. Hematological: Negative for adenopathy. Does not bruise/bleed easily. Psychiatric/Behavioral: Positive for decreased concentration. Negative for confusion, self-injury, sleep disturbance and suicidal ideas. The patient is not nervous/anxious. Prior to Visit Medications    Medication Sig Taking? Authorizing Provider   triamcinolone (ARISTOCORT) 0.5 % cream Apply topically 1-3 times daily as needed to skin affected areas Yes LUIS Paulino - CNP   triamcinolone (KENALOG) 0.1 % cream APPLY TOPICALLY TO THE AFFECTED AREA(S) TWICE DAILY AS DIRECTED Yes Paul Brand, DO   Fiber Adult Gummies 2 g CHEW Take 2 tablets by mouth daily Yes Historical Provider, MD   Lactobacillus (ACIDOPHILUS) CAPS capsule Take 1 capsule by mouth daily Yes Historical Provider, MD   estradiol (VIVELLE) 0.05 MG/24HR Place 1 patch onto the skin Twice a Week Yes Paul Brand, DO   donepezil (ARICEPT) 10 MG tablet TAKE 1 TABLET EVERY NIGHT Yes Alicia Zapata [address] bed for use at home due to fall risk getting in and out of bed with Parkinson's. Needs to be able to adjust height of bed getting in and out of need to elevate head of the bed in order to prevent aspiration secondary to GERD and Parkinson's.  Yes Paul Brand, DO   ipratropium (ATROVENT) 0.06 % nasal spray 2 sprays by Nasal route 3 times daily as needed for Rhinitis Yes Paul Brand, DO   carbidopa-levodopa (SINEMET)  MG per tablet TAKE 2 TABLETS  4 TIMES DAILY Yes Alena Mayo MD   mirabegron (MYRBETRIQ) 50 MG TB24 TAKE 1 TABLET EVERY MORNING Yes Dereck Andrews DO   lisinopril (PRINIVIL;ZESTRIL) 40 MG tablet TAKE 1 TABLET DAILY FOR HIGH BLOOD PRESSURE Yes Dawn Ballard DO   Misc. Devices (ROLLER Selene Areas) MISC Use whenever walking. Marielos Purchase, DO   Cyanocobalamin (B-12) 1000 MCG TABS Take 1 tablet by mouth daily Yes Dawn Ballard, DO   Cholecalciferol (VITAMIN D3) 25 MCG (1000 UT) CAPS Take 1 capsule by mouth daily Yes Dawn Ballard DO   folic acid (FOLVITE) 697 MCG tablet Take 1 tablet by mouth daily Indications: Increased Amount of Homocysteine in the Blood Yes Dawn Ballard, DO   ascorbic acid (VITAMIN C) 500 MG tablet Take 1 tablet by mouth daily Yes Dawn Ballard DO   tamsulosin (FLOMAX) 0.4 MG capsule Take 0.4 mg by mouth daily Indications: Enlarged Prostate with Urination Problems, urinary urgency and incontinence (uses Depends)  Yes Historical Provider, MD   simvastatin (ZOCOR) 10 MG tablet TAKE 1 TABLET EVERY NIGHT Yes Dereck Andrews DO   clobetasol (TEMOVATE) 0.05 % cream APPLY TOPICALLY TWICE DAILY AS DIRECTED Yes Dawn Ballard DO   budesonide-formoterol (SYMBICORT) 160-4.5 MCG/ACT AERO Inhale 2 puffs into the lungs 2 times daily Use spacer. Rinse and spit. Yes Dawn Ballard DO   aspirin 81 MG EC tablet Take 81 mg by mouth daily Indications: Cardiovascular Risk Reduction  Yes Historical Provider, MD   [place]-3 1000 MG CAPS Take 1 capsule by mouth 2 times daily  Patient not taking: Reported on 12/10/2021  Dawn Ballard DO        Social History     Tobacco Use    Smoking status: Never Smoker    Smokeless tobacco: Never Used   Substance Use Topics    Alcohol use: Yes     Alcohol/week: 5.8 standard drinks     Types: 7 Standard drinks or equivalent per week     Comment: 1 glass of wine qhs. Has to have at 8 pm or trouble walking.         Vitals:    12/10/21 1157   BP: 134/82   Site: Right Upper Arm   Position: Sitting   Cuff Size: Small Adult   Pulse: 67   Resp: 18   SpO2: 96%   Weight: 168 lb (76.2 kg)   Height: 5' 5\" (1.651 m)     Estimated body mass index is 27.96 kg/m² as calculated from the following:    Height as of this encounter: 5' 5\" (1.651 m). Weight as of this encounter: 168 lb (76.2 kg). Physical Exam  Vitals reviewed. Constitutional:       General: He is awake. Appearance: Normal appearance. He is well-developed, well-groomed and overweight. He is not ill-appearing or toxic-appearing. HENT:      Head: Normocephalic and atraumatic. Right Ear: Hearing, tympanic membrane, ear canal and external ear normal.      Left Ear: Hearing, tympanic membrane, ear canal and external ear normal.      Nose: Nose normal.      Mouth/Throat:      Lips: Pink. Mouth: Mucous membranes are moist.      Pharynx: Oropharynx is clear. Eyes:      General: Lids are normal.      Extraocular Movements: Extraocular movements intact. Conjunctiva/sclera: Conjunctivae normal.      Pupils: Pupils are equal, round, and reactive to light. Neck:      Thyroid: No thyromegaly. Vascular: No carotid bruit. Cardiovascular:      Rate and Rhythm: Normal rate. Pulses: Normal pulses. Carotid pulses are 2+ on the right side and 2+ on the left side. Radial pulses are 2+ on the right side and 2+ on the left side. Posterior tibial pulses are 2+ on the right side and 2+ on the left side. Heart sounds: Normal heart sounds, S1 normal and S2 normal. Heart sounds not distant. No murmur heard. Pulmonary:      Effort: Pulmonary effort is normal.      Breath sounds: Normal breath sounds. Chest:   Breasts:      Right: No supraclavicular adenopathy. Left: No supraclavicular adenopathy. Abdominal:      General: Bowel sounds are normal. There is no abdominal bruit. Palpations: Abdomen is soft. Tenderness: There is no abdominal tenderness. Genitourinary:     Comments: Deferred  Musculoskeletal:         General: Normal range of motion.       Cervical back: Full passive range

## 2021-12-22 ENCOUNTER — TELEPHONE (OUTPATIENT)
Dept: FAMILY MEDICINE CLINIC | Age: 81
End: 2021-12-22

## 2021-12-22 DIAGNOSIS — L25.9 CONTACT DERMATITIS, UNSPECIFIED CONTACT DERMATITIS TYPE, UNSPECIFIED TRIGGER: ICD-10-CM

## 2021-12-22 DIAGNOSIS — L11.1 GROVER'S DISEASE: ICD-10-CM

## 2021-12-22 RX ORDER — TRIAMCINOLONE ACETONIDE 5 MG/G
CREAM TOPICAL
Qty: 454 G | Refills: 2 | Status: SHIPPED | OUTPATIENT
Start: 2021-12-22 | End: 2021-12-28 | Stop reason: ALTCHOICE

## 2021-12-28 ENCOUNTER — OFFICE VISIT (OUTPATIENT)
Dept: FAMILY MEDICINE CLINIC | Age: 81
End: 2021-12-28
Payer: MEDICAID

## 2021-12-28 VITALS
OXYGEN SATURATION: 99 % | HEART RATE: 66 BPM | SYSTOLIC BLOOD PRESSURE: 128 MMHG | WEIGHT: 168 LBS | HEIGHT: 65 IN | BODY MASS INDEX: 27.99 KG/M2 | DIASTOLIC BLOOD PRESSURE: 80 MMHG

## 2021-12-28 DIAGNOSIS — E78.2 MIXED HYPERTRIGLYCERIDEMIA: Chronic | ICD-10-CM

## 2021-12-28 DIAGNOSIS — I10 BENIGN ESSENTIAL HYPERTENSION: Chronic | ICD-10-CM

## 2021-12-28 DIAGNOSIS — N39.42 URINARY INCONTINENCE WITHOUT SENSORY AWARENESS: ICD-10-CM

## 2021-12-28 DIAGNOSIS — G20 PARKINSON'S DISEASE (HCC): ICD-10-CM

## 2021-12-28 DIAGNOSIS — F03.91 DEMENTIA WITH BEHAVIORAL DISTURBANCE, UNSPECIFIED DEMENTIA TYPE: ICD-10-CM

## 2021-12-28 DIAGNOSIS — L11.1 GROVER'S DISEASE: Primary | ICD-10-CM

## 2021-12-28 PROCEDURE — 99215 OFFICE O/P EST HI 40 MIN: CPT | Performed by: FAMILY MEDICINE

## 2021-12-28 PROCEDURE — G8427 DOCREV CUR MEDS BY ELIG CLIN: HCPCS | Performed by: FAMILY MEDICINE

## 2021-12-28 PROCEDURE — 1123F ACP DISCUSS/DSCN MKR DOCD: CPT | Performed by: FAMILY MEDICINE

## 2021-12-28 PROCEDURE — 4040F PNEUMOC VAC/ADMIN/RCVD: CPT | Performed by: FAMILY MEDICINE

## 2021-12-28 PROCEDURE — 1036F TOBACCO NON-USER: CPT | Performed by: FAMILY MEDICINE

## 2021-12-28 PROCEDURE — G8484 FLU IMMUNIZE NO ADMIN: HCPCS | Performed by: FAMILY MEDICINE

## 2021-12-28 PROCEDURE — G8417 CALC BMI ABV UP PARAM F/U: HCPCS | Performed by: FAMILY MEDICINE

## 2021-12-28 RX ORDER — SIMVASTATIN 10 MG
TABLET ORAL
Qty: 90 TABLET | Refills: 0 | Status: SHIPPED | OUTPATIENT
Start: 2021-12-28

## 2021-12-28 RX ORDER — METHYLPREDNISOLONE 4 MG/1
TABLET ORAL
Qty: 1 KIT | Refills: 0 | Status: SHIPPED | OUTPATIENT
Start: 2021-12-28 | End: 2022-01-03

## 2021-12-28 RX ORDER — LISINOPRIL 40 MG/1
TABLET ORAL
Qty: 90 TABLET | Refills: 0 | Status: SHIPPED | OUTPATIENT
Start: 2021-12-28

## 2021-12-28 NOTE — PATIENT INSTRUCTIONS
GENERAL OFFICE POLICIES      Telephone Calls: Messages will be answered within 1-2 business days, unless the provider is out of the office. If it is urgent a covering provider will answer. (this does not include Medication refills). MyChart: We recommend all patients sign up for [facility]. Through this portal you can see your lab results, request refills, schedule appointments, pay your bill and send messages to the office. [facility] messages will be answered within 1-2 business days unless the provider is out of the office. For urgent matters, please call the office. Appointments:  All appointments must be scheduled. We ask all patients to schedule their next follow up appointment before they leave the office to make sure you will be able to be seen before you run out of medications. 24 hours notice is required to cancel or reschedule an appointment to avoid being marked as a no show. You may be dismissed from the practice after 3 no shows. LATE for Appointment: If you are 15 or more minutes late for your appointment, you may be asked to reschedule. MA/LAB APPTS: Must be scheduled, cannot accept walk in lab visits. We only draw labs for patients established in our office. We only do injections for medications ordered by our office. Acute Sick Visits:  Nothing other than acute complaint will be addressed at this visit. TRADITIONAL MEDICARE  DOES NOT COVER PHYSICALS  MEDICARE WELLNESS VISITS: These are NOT physicals but the free annual visit offered by Medicare to discuss wellness issues. Medication refills, checkups, etc. will not be addressed during this visit. Medication Refills: Refills are handled electronically so please contact your pharmacy for medication refills even if current refills have been exhausted. If you are on a controlled medication you will be referred to a specialist (pain specialist, psychiatry, etc). Forms:  There is a $35 fee to fill out FMLA/Disability paperwork, payable at time of . Instead of the fee, you can choose to have the paperwork filled out during a separate office visit that is for filling out the paperwork only. Medication Samples: This office does not carry medication samples. If you need assistance in getting your medications, then please let the medical assistant know so they can help you sign up for a drug assistance program that can help get medications at a reduced cost or even free (if you qualify). Workman's Comp Claims: We do not handle workman's comp cases or claims. You will need to go to an urgent care to be seen or to whomever your employer uses. General - Any abusive/rude behavior toward staff/providers may be cause for dismissal.    Jared Ventura was seen today for rash. Diagnoses and all orders for this visit:    Jeremias's disease  -     methylPREDNISolone (MEDROL DOSEPACK) 4 MG tablet; Take by mouth. Luke warm showers. Use less soap. Trial of A & D ointment.     Contact [employer] re availability of Aristocort 0.5%

## 2021-12-28 NOTE — TELEPHONE ENCOUNTER
[place] 7/27/21 WITH DR Donnell Wen FOR HTN AND CHOLESTEROL   Return in about 6 months (around 1/27/2022) for Hypertension, Cholesterol. Angie Chowdhury was seen today for hypertension and hyperlipidemia      [place] 1/27/21 WITH DR Donnell Wen     0 Result Notes     1 Patient Communication     2 HM Topics    Component Ref Range & Units 2/5/21 1040 7/8/20 1533 11/27/19 1030 11/6/18 0752 7/17/17 1011 7/18/16 1437 6/9/16 1607   Sodium 136 - 145 mmol/L 139  141  138  146 High   141  138  141    Potassium 3.5 - 5.1 mmol/L 4.2  4.4  4.4  4.3  3.9  3.6  3.9    Chloride 99 - 110 mmol/L 103  101  99  104  101  98 Low   98 Low     CO2 21 - 32 mmol/L 28  29  29  28  28  26  29    Anion Gap 3 - 16 8  11  10  14  12  14  14    Glucose 70 - 99 mg/dL 102 High   94  115 High   113 High   118 High   152 High   95    BUN 7 - 20 mg/dL 13  17  15  16  16  13  14    CREATININE 0.8 - 1.3 mg/dL 0.9  0.9  0.9  1.0  1.0  0.9  1.0    GFR Non-African American >60 >60  >60 CM  >60 CM  >60 CM  >60 CM  >60 CM  >60 CM    Comment: >60 mL/min/1.73m2 EGFR, calc. for ages 25 and older using the   MDRD formula (not corrected for weight), is valid for stable   renal function. GFR  >60 >60  >60 CM  >60 CM  >60 CM  >60 CM  >60 CM  >60 CM    Comment: Chronic Kidney Disease: less than 60 ml/min/1.73 sq. m.         Kidney Failure: less than 15 ml/min/1.73 sq.m. Results valid for patients 18 years and older.     Calcium 8.3 - 10.6 mg/dL 8.8  9.2  9.0  9.2  9.3  9.1  9.3    Total Protein 6.4 - 8.2 g/dL 6.3 Low    6.4  6.4  6.4   6.3 Low     Albumin 3.4 - 5.0 g/dL 3.9   4.1  4.2  4.0  4.3  4.2    Albumin/Globulin Ratio 1.1 - 2.2 1.6   1.8  1.9  1.7   2.0    Total Bilirubin 0.0 - 1.0 mg/dL 0.8   0.7  0.6  0.6   0.4    Alkaline Phosphatase 40 - 129 U/L 65   60  69  58   63    ALT 10 - 40 U/L 17   17  31  19   22    AST 15 - 37 U/L 20   17  22  19   20    Globulin g/dL 2.4   2.3  2.2  2.4   2.1    Phosphorus       2.5 R          Component Ref Range & Units 2/5/21 5262 11/27/19 1030 11/6/18 0752 7/17/17 1011 11/20/14 1123 2/27/14 0924 1/29/13 0915   Cholesterol, Total 0 - 199 mg/dL 146  148  170  169  192 R  193  174 R    Triglycerides 0 - 150 mg/dL 77  117  116  111  131 R  155 High   176 High  R    HDL 40 - 60 mg/dL 67 High   69 High   70 High   73 High   70 High  R  67 High   56 R    LDL Calculated <100 mg/dL 64  56  77  74   95  83 R    VLDL Cholesterol Calculated Not Established mg/dL 15  23  23  22   31  35 R    LDL PARTICLE Number, NMR      1097 High  R, CM      LP Insulin Resist Score, NMR      <25 R, CM      Small LDL Particle, NMR      436 R, CM      LDL Particle Size, NMR      20.6 R      Large HDL Particle, NMR      8.0 R      Large VLDL Particle, NMR      <0.8 R      LDL Cholesterol      96 R      HDL Size, NMR      9.1 Low  R      VLDL Size, NMR      40.1 R      HDL Particle No, NMR      41.0 R

## 2021-12-28 NOTE — PROGRESS NOTES
Adrianna Tuttle (:  1940) is a 80 y.o. male,Established patient, here for evaluation of the following chief complaint(s):  Rash (GROVERS DISEASE CREAM IS NO LONGER WORKING SWITCHED TO CORTIZONE )       ASSESSMENT/PLAN:  309    Patient Instructions   GENERAL OFFICE POLICIES      Telephone Calls: Messages will be answered within 1-2 business days, unless the provider is out of the office. If it is urgent a covering provider will answer. (this does not include Medication refills). MyChart: We recommend all patients sign up for MyChart. Through this portal you can see your lab results, request refills, schedule appointments, pay your bill and send messages to the office. [employer] messages will be answered within 1-2 business days unless the provider is out of the office. For urgent matters, please call the office. Appointments:  All appointments must be scheduled. We ask all patients to schedule their next follow up appointment before they leave the office to make sure you will be able to be seen before you run out of medications. 24 hours notice is required to cancel or reschedule an appointment to avoid being marked as a no show. You may be dismissed from the practice after 3 no shows. LATE for Appointment: If you are 15 or more minutes late for your appointment, you may be asked to reschedule. MA/LAB APPTS: Must be scheduled, cannot accept walk in lab visits. We only draw labs for patients established in our office. We only do injections for medications ordered by our office. Acute Sick Visits:  Nothing other than acute complaint will be addressed at this visit. TRADITIONAL MEDICARE  DOES NOT COVER PHYSICALS  MEDICARE WELLNESS VISITS: These are NOT physicals but the free annual visit offered by Medicare to discuss wellness issues. Medication refills, checkups, etc. will not be addressed during this visit.   Medication Refills: Refills are handled electronically so please contact your pharmacy for medication refills even if current refills have been exhausted. If you are on a controlled medication you will be referred to a specialist (pain specialist, psychiatry, etc). Forms: There is a $35 fee to fill out FMLA/Disability paperwork, payable at time of . Instead of the fee, you can choose to have the paperwork filled out during a separate office visit that is for filling out the paperwork only. Medication Samples: This office does not carry medication samples. If you need assistance in getting your medications, then please let the medical assistant know so they can help you sign up for a drug assistance program that can help get medications at a reduced cost or even free (if you qualify). Workman's Comp Claims: We do not handle workman's comp cases or claims. You will need to go to an urgent care to be seen or to whomever your employer uses. General - Any abusive/rude behavior toward staff/providers may be cause for dismissal.    Cjjamil Codysindy was seen today for rash. Diagnoses and all orders for this visit:    [place]'s disease  -     methylPREDNISolone (MEDROL DOSEPACK) 4 MG tablet; Take by mouth. Luke warm showers. Use less soap. Trial of A & D ointment. Contact [employer] re availability of Aristocort 0.5%    Urinary incontinence without sensory awareness - related to dementia  Discontinue Myrbetriq and tamsulosin from the med list. Continue incontinence pads. Parkinson's disease (HCC)/Dementia with behavioral disturbance, unspecified dementia type [facility])  Patient also has functional urinary incontinence because he cannot get up and go to the bathroom fast enough and cannot control his bladder due to his Parkinson's. On top of that with his dementia he is not able to recognize when he needs to go until the verge is very strong. Return if symptoms worsen or fail to improve, for as scheduled.      Subjective   SUBJECTIVE/OBJECTIVE:  Chief Complaint   Patient presents with    Rash GROVERS DISEASE CREAM IS NO LONGER WORKING SWITCHED TO CORTIZONE    230  Rash    GROVERS   Triamcinolone is not working anymore. CNP in our office tried to order a stronger Triamcinolone and couldn't get it per [facility] as it was not available. Scratching till blood. They have a humidifier which is set at 33%. Cyproheptadine 4 mg tid prn from derm which was no help on 10/20/21. Wife used Cortisone 10 OTC thinking that the change might be better to help his itch but not much help. Not exercising. She tries to do the home exercise plan with him but has a hard time finding time. Can't be seen by derm till 3/2021. Trying to use lukewarm water when showers. She puts patient in the shower on the shower chair and sets the water lukewarm. When she leaves he adjusts the temperature up. His wife is reading about Grovers disease. He is on vitamin D 1000 IUs daily. He is on omega-3 1000 mg 2 times daily. Urinary incontinence without sensory awareness - related to dementia  Seen by urology. Had a scope of his bladder and found that it was not BPH causing his urinary incontinence. They were getting no improvement with tamsulosin nor with Myrbetriq so they discontinued them and are just using incontinence pads. Parkinson's disease (HCC)/Dementia with behavioral disturbance, unspecified dementia type ([address]  He has progression of both these problems which are major factors in his lack of urinary control. He loses his urine before he even stands up to go to the bathroom. Review of Systems   Skin: Positive for rash. Past Medical History:   Diagnosis Date    Allergic conjunctivitis of both eyes     Anemia     macrocytic    Asthmatic bronchitis     recurrent, and pneumonia    BBB (bundle branch block)     Right    Benign essential hypertension     BPH (benign prostatic hyperplasia)     Bradycardia 3/29/2015    hospital x 36 hours.  Stopped Verapamil    Colon polyp 2/18/2015    q 3 yr    Colon polyp 03/15/2018    COLONOSCOPY, DR ACUÑA [address] [employer] [facility], 3 MM ASCENDING COLON POLYP REMOVED BY COLD BIOPSY FORCEPS, 6 MM TRANSVERSE COLON POLYP REMOVED BY COLD SNARE POLYPECTOMY, 8 MM DESCENDING COLON POLYP REMOVED BY HOT SNARE POLYPECTOMY. REPEAT IN 3 YRS.  Coronary atheroma 2006 or 2007    mild plaques -no clinical CAD wkup  for irreg heart beat - testing neg dr. Josefina Abraham not seen for yrs    DDD (degenerative disc disease), lumbar     L5 - S1    Diverticulosis of colon 03/15/2018    COLONOSCOPY,  [facility],  [facility], MILD TO MODERATE LEFT SIDED DIVERTICULOSIS. 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- no flare up at this time 8/65/2014    Hearing loss d/t noise     bilateral aids    Impaired fasting glucose     Leukopenia     myelodysplasia/ macrocytic anemia stable 5-6 yrs, fmd monitoring no bone marrow testdon, confirmed with lab work    Low back pain on left side with sciatica 1/68/1618    Metabolic syndrome     Mixed hypertriglyceridemia     Nausea & vomiting     Plantar fasciitis, bilateral 12/1/2012    L>R     Pneumonia     recurrent    Prostatitis 1970s    Tubulovillous adenoma     of colon    Urinary incontinence without sensory awareness - related to BPH     Vitamin D deficiency        Past Surgical History:   Procedure Laterality Date    CATARACT REMOVAL WITH IMPLANT  bilateral    COLONOSCOPY  02/18/2015    1 polyp, q 3 years    COLONOSCOPY N/A 03/15/2018    COLONOSCOPY, DR ACUÑA [address] [place] [employer] [facility], 3 MM ASCENDING COLON POLYP REMOVED BY COLD BIOPSY FORCEPS, 6 MM TRANSVERSE COLON POLYP REMOVED BY COLD SNARE POLYPECTOMY, 8 MM DESCENDING COLON POLYP REMOVED BY HOT SNARE POLYPECTOMY.   MILD TO MODERATE LEFT SIDED DIVERTICULOSIS. 3 TA's  REPEAT IN 3 YRS.    CYSTOSCOPY      CYSTOSCOPY N/A 07/16/2021    NORMAL. No stenosis of urethra from prostate.  EYE SURGERY Right 12/2016    laser to implant lens    EYE SURGERY Left 2015    laser to implant lens    LUMBAR LAMINECTOMY  08/06/2014    microlumbar laminectomy L4 L5    TURP  08/2007    for obstruction    TURP  04/15/2013     & Cystoscopy     WISDOM TOOTH EXTRACTION  1960/1970       Social History     Socioeconomic History    Marital status:      Spouse name: De Whyte Number of children: 2    Years of education: 23    Highest education level: Not on file   Occupational History    Occupation: RETIRED PSYCHOLOGIST 2009   Tobacco Use    Smoking status: Never Smoker    Smokeless tobacco: Never Used   Vaping Use    Vaping Use: Never used   Substance and Sexual Activity    Alcohol use: Yes     Alcohol/week: 5.8 standard drinks     Types: 7 Standard drinks or equivalent per week     Comment: 1 glass of wine qhs. Has to have at 8 pm or trouble walking.  Drug use: No     Comment: Never tried drugs.  Sexual activity: Not Currently   Other Topics Concern    Not on file   Social History Narrative    Memory issues 2016. Exercise with Nautilus 20 min 3x/wk &  Walks 20 min 2-3x/wk. 1/10/17.  7/12/17. Walking 3 x/wk for 1/4-1/2 mi, using a nautilus machine 1-2x/wk 20-25 min -plans an elliptical machine. 11/8/17. Can't use the elliptical due to Parkinson's. Joined [employer]. Not there yet. 1/16/18. Goes to 2-3x/wk [employer] sne[employer]. 5/9/18. 11/8/18. 3x/wk 30 min. 3/14/19. 4/1/19. Does bike, machines for arms/leg/core body. 6/6/19. Goes to gym 3x/wk for 30 min. 11/20/19. Doing Parkinson's exercises from a video for 10 min. 7/8/20. Walks mostly to go to the bathroom with 4 prong cane. Walks around the house. Walks outside. Was doing [employer] and [employer]. 9/3/20. Getting physical therapy at the house. Using a walker. Also getting speech therapy. Has hard time getting up from a chair. 1/26/21.  Aide comes out 2x/wk and does PT exercises. 6/4/21. 7/2/7/21. Occasional exercise from [facility] with PT. Social Determinants of Health     Financial Resource Strain: Low Risk     Difficulty of Paying Living Expenses: Not hard at all   Food Insecurity: No Food Insecurity    Worried About Running Out of Food in the Last Year: Never true    Jacqueline of Food in the Last Year: Never true   Transportation Needs:     Lack of Transportation (Medical): Not on file    Lack of Transportation (Non-Medical):  Not on file   Physical Activity:     Days of Exercise per Week: Not on file    Minutes of Exercise per Session: Not on file   Stress:     Feeling of Stress : Not on file   Social Connections:     Frequency of Communication with Friends and Family: Not on file    Frequency of Social Gatherings with Friends and Family: Not on file    Attends Scientologist Services: Not on file    Active Member of Clubs or Organizations: Not on file    Attends Club or Organization Meetings: Not on file    Marital Status: Not on file   Intimate Partner Violence:     Fear of Current or Ex-Partner: Not on file    Emotionally Abused: Not on file    Physically Abused: Not on file    Sexually Abused: Not on file   Housing Stability:     Unable to Pay for Housing in the Last Year: Not on file    Number of [place] in the Last Year: Not on file    Unstable Housing in the Last Year: Not on file       Family History   Problem Relation Age of Onset    Diabetes Mother     Hypertension Mother     Stroke Mother 80    Breast Cancer Mother     Heart Surgery Mother         heart valve replacement    Dementia Mother 80    Prostate Cancer Father     Cancer Father     Diabetes Maternal Grandmother     Stroke Maternal Grandfather     Stroke Paternal Grandfather     Other Sister         DDD C & T spine    Scoliosis Sister     Liver Disease Sister         hepatitits    Mental Retardation Brother         birth anoxia    Obesity Brother     Seizures Brother aspirin 81 MG EC tablet Take 81 mg by mouth daily Indications: Cardiovascular Risk Reduction       triamcinolone (ARISTOCORT) 0.5 % cream Apply topically 1-3 times daily as needed to skin affected areas (Patient not taking: Reported on 12/28/2021) 454 g 2    Misc. Devices (ROLLER [place]) MISC Use whenever walking. . (Patient not taking: Reported on 12/28/2021) 1 each 0     No current facility-administered medications for this visit. Objective   Physical Exam  Skin:     Coloration: Skin is not ashen, cyanotic, jaundiced, mottled, pale or sallow. Findings: Bruising and rash present. Rash is macular and papular. Neurological:      Gait: Gait abnormal.      Comments: Walking with a walker due to unsteadiness. Psychiatric:         Attention and Perception: Attention normal.         Mood and Affect: Mood is not anxious or elated. Affect is flat. Affect is not labile, blunt, angry, tearful or inappropriate. Speech: He is noncommunicative ( Only speaking a few words now. Not forming sentences to answer questions and previously very fluent speaker. ). Speech is delayed. Speech is not rapid and pressured, slurred or tangential.         Behavior: Behavior is withdrawn. Behavior is not agitated, slowed, aggressive, hyperactive or combative. Behavior is cooperative. Cognition and Memory: Cognition is impaired. Memory is impaired. He exhibits impaired recent memory and impaired remote memory. Judgment: Judgment normal. Judgment is not impulsive or inappropriate.       Comments: Judgment in the office is normal.        Vitals:    12/28/21 1352   BP: 128/80   Site: Left Upper Arm   Position: Sitting   Cuff Size: Medium Adult   Pulse: 66   SpO2: 99%   Weight: 168 lb (76.2 kg)   Height: 5' 5\" (1.651 m)     BP Readings from Last 3 Encounters:   12/28/21 128/80   12/10/21 134/82   08/10/21 107/65     Pulse Readings from Last 3 Encounters:   12/28/21 66   12/10/21 67   08/10/21 81     Wt Readings from Last 3 Encounters:   12/28/21 168 lb (76.2 kg)   12/10/21 168 lb (76.2 kg)   08/10/21 176 lb 3.2 oz (79.9 kg)     Body mass index is 27.96 kg/m². On this date 12/28/2021 I have spent 39 minutes reviewing previous notes, test results and face to face with the patient discussing the diagnosis and importance of compliance with the treatment plan as well as documenting on the day of the visit. An electronic signature was used to authenticate this note.     --Manuel Valenzuela, DO

## 2022-01-02 PROBLEM — F03.91 DEMENTIA WITH BEHAVIORAL DISTURBANCE, UNSPECIFIED DEMENTIA TYPE: Status: ACTIVE | Noted: 2022-01-02

## 2022-01-02 PROBLEM — G20.A1 PARKINSON'S DISEASE: Status: ACTIVE | Noted: 2022-01-02

## 2022-01-02 PROBLEM — G20 PARKINSON'S DISEASE (HCC): Status: ACTIVE | Noted: 2022-01-02

## 2022-01-06 ENCOUNTER — PATIENT MESSAGE (OUTPATIENT)
Dept: FAMILY MEDICINE CLINIC | Age: 82
End: 2022-01-06

## 2022-01-06 DIAGNOSIS — F03.91 DEMENTIA WITH BEHAVIORAL DISTURBANCE, UNSPECIFIED DEMENTIA TYPE: ICD-10-CM

## 2022-01-06 DIAGNOSIS — G25.9 EXTRAPYRAMIDAL SYNDROME: ICD-10-CM

## 2022-01-06 DIAGNOSIS — L11.1 GROVER'S DISEASE: Primary | ICD-10-CM

## 2022-01-06 DIAGNOSIS — L29.9 PRURITUS: ICD-10-CM

## 2022-01-06 NOTE — TELEPHONE ENCOUNTER
From: Ashley Dewey  To: Dr. Freddy Torres  Sent: 1/6/2022 1:41 PM EST  Subject: Lord Ponds and palliative care    Harshil ford improved as a result of the steroid pack, but still has an area hes scratching. Do you think he should try a maintenance dose as we discussed when you saw him? On another topic, I feel he could benefit from palliative care at this point in the progression of his Parkinsons and dementia. Is this something you would recommend? Thank you for all your wisdom and help during this difficult.      Baljit Lozano

## 2022-01-08 RX ORDER — PREDNISONE 1 MG/1
5 TABLET ORAL DAILY
Qty: 90 TABLET | Refills: 0 | Status: SHIPPED | OUTPATIENT
Start: 2022-01-08 | End: 2022-04-08

## 2022-01-08 NOTE — TELEPHONE ENCOUNTER
Prednisone 5 mg called in.  Hughson's disease  -     predniSONE (DELTASONE) 5 MG tablet; Take 1 tablet by mouth daily  Pruritus  -     predniSONE (DELTASONE) 5 MG tablet; Take 1 tablet by mouth daily  MyChart message sent also.

## 2022-01-19 DIAGNOSIS — F02.818 DEMENTIA DUE TO PARKINSON'S DISEASE WITH BEHAVIORAL DISTURBANCE (HCC): ICD-10-CM

## 2022-01-19 DIAGNOSIS — G20 DEMENTIA DUE TO PARKINSON'S DISEASE WITH BEHAVIORAL DISTURBANCE (HCC): ICD-10-CM

## 2022-01-19 DIAGNOSIS — F52.8 HYPERSEXUALITY: ICD-10-CM

## 2022-01-19 RX ORDER — ESTRADIOL 0.05 MG/D
FILM, EXTENDED RELEASE TRANSDERMAL
Qty: 8 PATCH | Refills: 0 | Status: SHIPPED | OUTPATIENT
Start: 2022-01-19

## 2022-01-19 NOTE — TELEPHONE ENCOUNTER
Last OV 12/28/2021 JDK  Next OV   [facility]  6 MONTH FOLLOW UP ON HTN, CHOLESTEROL

## 2022-01-24 ASSESSMENT — PATIENT HEALTH QUESTIONNAIRE - PHQ9
SUM OF ALL RESPONSES TO PHQ QUESTIONS 1-9: 1
SUM OF ALL RESPONSES TO PHQ QUESTIONS 1-9: 1
2. FEELING DOWN, DEPRESSED OR HOPELESS: 0
SUM OF ALL RESPONSES TO PHQ9 QUESTIONS 1 & 2: 1
SUM OF ALL RESPONSES TO PHQ QUESTIONS 1-9: 1
SUM OF ALL RESPONSES TO PHQ QUESTIONS 1-9: 1
1. LITTLE INTEREST OR PLEASURE IN DOING THINGS: 1

## 2022-01-24 ASSESSMENT — LIFESTYLE VARIABLES
HOW OFTEN DO YOU HAVE A DRINK CONTAINING ALCOHOL: 0
AUDIT-C TOTAL SCORE: INCOMPLETE
HOW OFTEN DO YOU HAVE A DRINK CONTAINING ALCOHOL: NEVER
AUDIT TOTAL SCORE: INCOMPLETE

## 2022-01-26 ENCOUNTER — PATIENT MESSAGE (OUTPATIENT)
Dept: FAMILY MEDICINE CLINIC | Age: 82
End: 2022-01-26

## 2022-01-26 DIAGNOSIS — L11.1 GROVER'S DISEASE: Primary | ICD-10-CM

## 2022-01-26 NOTE — TELEPHONE ENCOUNTER
From: [facility]  To: Dr. Iris Tinajero  Sent: 1/26/2022 12:03 PM EST  Subject: Referral    Lauryn Gregory needs a referral to Dr. Dary Bhagat, dermatologist, for his Jeremiass Disease. .  Fax is [phone]. He has an appointment February 22. Thank you.    Sharri Barragan

## 2022-01-27 ENCOUNTER — OFFICE VISIT (OUTPATIENT)
Dept: FAMILY MEDICINE CLINIC | Age: 82
End: 2022-01-27
Payer: MEDICARE

## 2022-01-27 VITALS
WEIGHT: 170 LBS | OXYGEN SATURATION: 97 % | HEIGHT: 67 IN | HEART RATE: 71 BPM | BODY MASS INDEX: 26.68 KG/M2 | RESPIRATION RATE: 16 BRPM | DIASTOLIC BLOOD PRESSURE: 72 MMHG | SYSTOLIC BLOOD PRESSURE: 116 MMHG

## 2022-01-27 DIAGNOSIS — G20 PARKINSON'S DISEASE (HCC): ICD-10-CM

## 2022-01-27 DIAGNOSIS — E78.2 MIXED HYPERTRIGLYCERIDEMIA: ICD-10-CM

## 2022-01-27 DIAGNOSIS — D70.9 NEUTROPENIA, UNSPECIFIED TYPE (HCC): Chronic | ICD-10-CM

## 2022-01-27 DIAGNOSIS — R35.0 BENIGN PROSTATIC HYPERPLASIA WITH URINARY FREQUENCY: Chronic | ICD-10-CM

## 2022-01-27 DIAGNOSIS — Z00.00 ROUTINE GENERAL MEDICAL EXAMINATION AT A HEALTH CARE FACILITY: Primary | ICD-10-CM

## 2022-01-27 DIAGNOSIS — R53.1 GENERALIZED WEAKNESS: ICD-10-CM

## 2022-01-27 DIAGNOSIS — D32.9 MENINGIOMA (HCC): ICD-10-CM

## 2022-01-27 DIAGNOSIS — N40.1 BENIGN PROSTATIC HYPERPLASIA WITH URINARY FREQUENCY: Chronic | ICD-10-CM

## 2022-01-27 DIAGNOSIS — Z23 NEED FOR PROPHYLACTIC VACCINATION AGAINST DIPHTHERIA-TETANUS-PERTUSSIS (DTP): ICD-10-CM

## 2022-01-27 DIAGNOSIS — F03.91 DEMENTIA WITH BEHAVIORAL DISTURBANCE, UNSPECIFIED DEMENTIA TYPE: ICD-10-CM

## 2022-01-27 DIAGNOSIS — M51.36 DDD (DEGENERATIVE DISC DISEASE), LUMBAR: Chronic | ICD-10-CM

## 2022-01-27 DIAGNOSIS — I10 BENIGN ESSENTIAL HYPERTENSION: Chronic | ICD-10-CM

## 2022-01-27 DIAGNOSIS — L11.1 GROVER'S DISEASE: Chronic | ICD-10-CM

## 2022-01-27 LAB — TSH REFLEX: 0.39 UIU/ML (ref 0.27–4.2)

## 2022-01-27 PROCEDURE — 1123F ACP DISCUSS/DSCN MKR DOCD: CPT | Performed by: FAMILY MEDICINE

## 2022-01-27 PROCEDURE — G0439 PPPS, SUBSEQ VISIT: HCPCS | Performed by: FAMILY MEDICINE

## 2022-01-27 PROCEDURE — G8484 FLU IMMUNIZE NO ADMIN: HCPCS | Performed by: FAMILY MEDICINE

## 2022-01-27 PROCEDURE — 36415 COLL VENOUS BLD VENIPUNCTURE: CPT | Performed by: FAMILY MEDICINE

## 2022-01-27 PROCEDURE — 4040F PNEUMOC VAC/ADMIN/RCVD: CPT | Performed by: FAMILY MEDICINE

## 2022-01-27 NOTE — PROGRESS NOTES
[facility] Wellness Visit  Name: Silvia Milton Date: 2022   MRN: [id number] Sex: Male   Age: [de-identified] y.o. Ethnicity: Non- / Non    : 1940 Race: White (non-)      Nhi Ruvalcaba is here for Medicare AWV ([address]), Hypertension (FOLLOW UP ON HTN), and Hyperlipidemia (FOLLOW UP ON CHOLESTEROL)    Screenings for behavioral, psychosocial and functional/safety risks, and cognitive dysfunction are all negative except as indicated below. These results, as well as other patient data from the [address] [facility] [place] [employer] form, are documented in Flowsheets linked to this Encounter. Review of systems in history of present illness or scanned in under media tab. Allergies   Allergen Reactions    Demerol Nausea Only       Prior to Visit Medications    Medication Sig Taking? Authorizing Provider   Tdap (ADACEL) 5-2-15.5 LF-MCG/0.5 injection Inject 0.5 mLs into the muscle once for 1 dose Yes Jaskaran Arcos DO   estradiol (VIVELLE) 0.05 MG/24HR APPLY 1 PATCH TOPICALLY TO THE SKIN 2 TIMES A WEEK Yes Dereck Andrews, DO   predniSONE (DELTASONE) 5 MG tablet Take 1 tablet by mouth daily Yes Jaskaran Arcos DO   lisinopril (PRINIVIL;ZESTRIL) 40 MG tablet TAKE 1 TABLET DAILY FOR HIGH BLOOD PRESSURE Yes Jaskaran Arcos DO   simvastatin (ZOCOR) 10 MG tablet TAKE 1 TABLET  NIGHTLY Yes Jaskaran Arcos DO   Fiber Adult Gummies 2 g CHEW Take 2 tablets by mouth daily Yes Historical Provider, MD   Lactobacillus (ACIDOPHILUS) CAPS capsule Take 1 capsule by mouth daily Yes Historical Provider, MD   donepezil (ARICEPT) 10 MG tablet TAKE 1 TABLET EVERY NIGHT Yes Alicia Watkins [address] bed for use at home due to fall risk getting in and out of bed with Parkinson's. Needs to be able to adjust height of bed getting in and out of need to elevate head of the bed in order to prevent aspiration secondary to GERD and Parkinson's.  Yes Jaskaran Arcos DO   ipratropium (ATROVENT) 0.06 % nasal spray 2 sprays by Nasal route 3 times daily as needed for Rhinitis Yes Pricilla Akbar, DO   carbidopa-levodopa (SINEMET)  MG per tablet TAKE 2 TABLETS  4 TIMES DAILY Yes Latrice Ding MD   Misc. Devices (ROLLER [place]) MISC Use whenever walking. [place], DO   Cyanocobalamin (B-12) 1000 MCG TABS Take 1 tablet by mouth daily Yes Pricilla Akbar DO   Cholecalciferol (VITAMIN D3) 25 MCG (1000 UT) CAPS Take 1 capsule by mouth daily Yes Pricilla Akbar DO   folic acid (FOLVITE) 165 MCG tablet Take 1 tablet by mouth daily Indications: Increased Amount of Homocysteine in the Blood Yes Pricilla Akbar DO   ascorbic acid (VITAMIN C) 500 MG tablet Take 1 tablet by mouth daily Yes Pricilla Akbar DO   aspirin 81 MG EC tablet Take 81 mg by mouth daily Indications: Cardiovascular Risk Reduction  Yes Historical Provider, MD       Past Medical History:   Diagnosis Date    Allergic conjunctivitis of both eyes     Anemia     macrocytic    Asthmatic bronchitis     recurrent, and pneumonia    BBB (bundle branch block)     Right    Benign essential hypertension     BPH (benign prostatic hyperplasia)     Bradycardia 3/29/2015    hospital x 36 hours. Stopped Verapamil    Colon polyp 2/18/2015    q 3 yr    Colon polyp 03/15/2018    COLONOSCOPY, DR ACUÑA [address] [place]. com. cn [facility], 3 MM ASCENDING COLON POLYP REMOVED BY COLD BIOPSY FORCEPS, 6 MM TRANSVERSE COLON POLYP REMOVED BY COLD SNARE POLYPECTOMY, 8 MM DESCENDING COLON POLYP REMOVED BY HOT SNARE POLYPECTOMY. REPEAT IN 3 YRS.  Coronary atheroma 2006 or 2007    mild plaques -no clinical CAD wkup  for irreg heart beat - testing neg dr. Teresa Taylor not seen for yrs    DDD (degenerative disc disease), lumbar     L5 - S1    Diverticulosis of colon 03/15/2018    COLONOSCOPY,  [facility],  [place]. com. c[place] [facility], MILD TO MODERATE LEFT SIDED DIVERTICULOSIS. 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- no flare up at this time 8/65/2014    Hearing loss d/t noise     bilateral aids    Impaired fasting glucose     Leukopenia     myelodysplasia/ macrocytic anemia stable 5-6 yrs, fmd monitoring no bone marrow testdon, confirmed with lab work    Low back pain on left side with sciatica 8/82/0203    Metabolic syndrome     Mixed hypertriglyceridemia     Nausea & vomiting     Plantar fasciitis, bilateral 12/1/2012    L>R     Pneumonia     recurrent    Prostatitis 1970s    Tubulovillous adenoma     of colon    Urinary incontinence without sensory awareness - related to BPH     Vitamin D deficiency        Past Surgical History:   Procedure Laterality Date    CATARACT REMOVAL WITH IMPLANT Left 08/15/2011    CATARACT REMOVAL WITH IMPLANT Right 09/2011    COLONOSCOPY  02/18/2015    1 polyp, q 3 years    COLONOSCOPY N/A 03/15/2018    COLONOSCOPY, DR ACUÑA [facility], 3 MM ASCENDING COLON POLYP REMOVED BY COLD BIOPSY FORCEPS, 6 MM TRANSVERSE COLON POLYP REMOVED BY COLD SNARE POLYPECTOMY, 8 MM DESCENDING COLON POLYP REMOVED BY HOT SNARE POLYPECTOMY. MILD TO MODERATE LEFT SIDED DIVERTICULOSIS. 3 TA's  REPEAT IN 3 YRS.    CYSTOSCOPY  08/2007    CYSTOSCOPY N/A 07/16/2021    NORMAL. No stenosis of urethra from prostate.     EYE SURGERY Right 12/2016    laser to implant lens    EYE SURGERY Left 2015    laser to implant lens    LUMBAR LAMINECTOMY  08/06/2014    microlumbar laminectomy L4 L5    TURP  08/2007    for obstruction    TURP  04/15/2013     & Cystoscopy     WISDOM TOOTH EXTRACTION  1960/1970       Family History   Problem Relation Age of Onset    Diabetes Mother     Hypertension Mother     Stroke Mother 80    Breast Cancer Mother     Heart Surgery Mother         heart valve replacement    Dementia Mother 80    Prostate Cancer Father     Cancer Father     Other Sister DDD C & T spine, COVID-19    Scoliosis Sister     Liver Disease Sister         hepatitits    Mental Retardation Brother         birth anoxia    Obesity Brother     Seizures Brother     Parkinsonism Brother 72    Diabetes Maternal Grandmother     Stroke Maternal Grandfather     Stroke Paternal Grandfather     No Known Problems Daughter     Other Son         Benign brain tumor    Sudden Death Maternal Uncle     Coronary Art Dis Maternal Uncle     Heart Attack Maternal Uncle        Social History     Socioeconomic History    Marital status:      Spouse name: Geraldo Sanders Number of children: 2    Years of education: 23    Highest education level: None   Occupational History    Occupation: RETIRED PSYCHOLOGIST 2009   Tobacco Use    Smoking status: Never Smoker    Smokeless tobacco: Never Used   Vaping Use    Vaping Use: Never used   Substance and Sexual Activity    Alcohol use: Not Currently     Comment: 1 glass of wine qhs. Has to have at 8 pm or trouble walking.  Drug use: No     Comment: Never tried drugs.  Sexual activity: Not Currently   Other Topics Concern    None   Social History Narrative    Memory issues 2016. Exercise with Nautilus 20 min 3x/wk &  Walks 20 min 2-3x/wk. 1/10/17.  7/12/17. Walking 3 x/wk for 1/4-1/2 mi, using a nautilus machine 1-2x/wk 20-25 min -plans an elliptical machine. 11/8/17. Can't use the elliptical due to Parkinson's. Joined [employer]. Not there yet. 1/16/18. Goes to 2-3x/wk silver sneakers. 5/9/18. 11/8/18. 3x/wk 30 min. 3/14/19. 4/1/19. Does bike, machines for arms/leg/core body. 6/6/19. Goes to gym 3x/wk for 30 min. 11/20/19. Doing Parkinson's exercises from a video for 10 min. 7/8/20. Walks mostly to go to the bathroom with 4 prong cane. Walks around the house. Walks outside. Was doing Sit and Be Fit. 9/3/20. Getting physical therapy at the house. Using a walker. Also getting speech therapy.   Has hard time getting up from a chair. 1/26/21. Aide comes out 2x/wk and does PT exercises. 6/4/21. 7/2/7/21. Occasional exercise from [facility] with PT. 12/28/21. Can't do much b/c does not understand videos. Walks well with holding his hand. 1/27/22     Social Determinants of Health     Financial Resource Strain: Low Risk     Difficulty of Paying Living Expenses: Not hard at all   Food Insecurity: No Food Insecurity    Worried About Running Out of Food in the Last Year: Never true    Jacqueline of Food in the Last Year: Never true   Transportation Needs:     Lack of Transportation (Medical): Not on file    Lack of Transportation (Non-Medical):  Not on file   Physical Activity:     Days of Exercise per Week: Not on file    Minutes of Exercise per Session: Not on file   Stress:     Feeling of Stress : Not on file   Social Connections:     Frequency of Communication with Friends and Family: Not on file    Frequency of Social Gatherings with Friends and Family: Not on file    Attends Denominational Services: Not on file    Active Member of Clubs or Organizations: Not on file    Attends Club or Organization Meetings: Not on file    Marital Status: Not on file   Intimate Partner Violence:     Fear of Current or Ex-Partner: Not on file    Emotionally Abused: Not on file    Physically Abused: Not on file    Sexually Abused: Not on file   Housing Stability:     Unable to Pay for Housing in the Last Year: Not on file    Number of [place] in the Last Year: Not on file    Unstable Housing in the Last Year: Not on file     CareTeam (Including outside providers/suppliers regularly involved in providing care):   Patient Care Team:  Amrita Drew DO as PCP - General (Family Medicine)  Amrita Drew DO as PCP - [facility] Empaneled Provider  Mat Omalley MD as Consulting Physician (Gastroenterology)  Sukhjinder Lepe MD as Consulting Physician (Ophthalmology)  Petros Ortiz MD as Consulting Physician (Urology)  Chico Varghese (Inactive) as Consulting Physician (Dermatology)  Lena Fong MD as Consulting Physician (Hematology and Oncology)  Romelia Lopez MD as Consulting Physician (Cardiology)  Winston Vance as Counselor (Psychology)  Cece Payne MD as Consulting Physician (Otolaryngology)  Jessenia Roche MD as Consulting Physician (Neurology)    Physical Exam  Vitals and nursing note reviewed. Constitutional:       General: He is not in acute distress. Appearance: He is well-developed. He is not ill-appearing, toxic-appearing or diaphoretic. HENT:      Head: Normocephalic and atraumatic. Right Ear: Tympanic membrane, ear canal and external ear normal. No decreased hearing noted. No tenderness. No middle ear effusion. Left Ear: Tympanic membrane, ear canal and external ear normal. No decreased hearing noted. No tenderness. No middle ear effusion. Nose: Nose normal. No mucosal edema or rhinorrhea. Mouth/Throat:      Mouth: Mucous membranes are not pale, not dry and not cyanotic. No oral lesions. Dentition: Normal dentition. No dental caries. Pharynx: Uvula midline. No oropharyngeal exudate, posterior oropharyngeal erythema or uvula swelling. Tonsils: No tonsillar abscesses. Eyes:      General: No scleral icterus. Right eye: No discharge. Left eye: No discharge. Conjunctiva/sclera: Conjunctivae normal.      Pupils: Pupils are equal, round, and reactive to light. Neck:      Thyroid: No thyromegaly. Vascular: No carotid bruit or JVD. Trachea: Trachea and phonation normal. No tracheal tenderness or tracheal deviation. Cardiovascular:      Rate and Rhythm: Normal rate and regular rhythm. No extrasystoles are present. Pulses: No midsystolic click and no opening snap. Heart sounds: Normal heart sounds, S1 normal and S2 normal. Heart sounds not distant. No murmur heard. No friction rub. No gallop. No S3 or S4 sounds.     Pulmonary:      Effort: Pulmonary effort is normal. No accessory muscle usage or respiratory distress. Breath sounds: Normal breath sounds. No stridor. No decreased breath sounds, wheezing, rhonchi or rales. Chest:      Chest wall: No tenderness. Breasts:      Right: No supraclavicular adenopathy. Left: No supraclavicular adenopathy. Abdominal:      General: Bowel sounds are normal. There is no distension. Palpations: Abdomen is soft. Abdomen is not rigid. There is no mass or pulsatile mass. Tenderness: There is no abdominal tenderness. There is no guarding or rebound. Negative signs include Puga's sign and McBurney's sign. Musculoskeletal:      Cervical back: Normal range of motion and neck supple. Comments: GET UP AND GO:   Lymphadenopathy:      Head:      Right side of head: No submandibular, tonsillar, preauricular, posterior auricular or occipital adenopathy. Left side of head: No submandibular, tonsillar, preauricular, posterior auricular or occipital adenopathy. Cervical: No cervical adenopathy. Right cervical: No superficial, deep or posterior cervical adenopathy. Left cervical: No superficial, deep or posterior cervical adenopathy. Upper Body:      Right upper body: No supraclavicular or pectoral adenopathy. Left upper body: No supraclavicular or pectoral adenopathy. Skin:     General: Skin is warm and dry. Coloration: Skin is not ashen, cyanotic, jaundiced, mottled, pale or sallow. Findings: Bruising and rash present. Rash is macular and papular. Nails: There is no clubbing. Comments: Skin has moderate Grovers disease changes. Neurological:      Mental Status: He is alert and oriented to person, place, and time. Motor: Weakness (Trouble sitting up straight on his own leans to his left  ) present. No atrophy or abnormal muscle tone.       Coordination: Coordination abnormal.      Gait: Gait abnormal.      Deep Tendon Reflexes: Reflexes are normal and symmetric. Reflex Scores:       Patellar reflexes are 2+ on the right side and 2+ on the left side. Comments: Get up and go not attempted due to neuromuscular weakness and gait instability. Transfers to the exam table with assistance. Walks with a walker. Furniture walks if not using walker. Slow small steps. Psychiatric:         Attention and Perception: He is attentive. Mood and Affect: Mood normal. Mood is not anxious, depressed or elated. Affect is flat. Affect is not labile, blunt, angry, tearful or inappropriate. Speech: He is communicative. Speech is delayed ( Rare speech). Speech is not rapid and pressured, slurred or tangential.         Behavior: Behavior is slowed and withdrawn. Behavior is not agitated, aggressive, hyperactive or combative. Behavior is cooperative. Cognition and Memory: Cognition is impaired. Memory is impaired. He exhibits impaired recent memory and impaired remote memory. Judgment: Judgment normal.      Comments: Normal judgment in the office     Based upon direct observation of the patient, evaluation of cognition reveals recent and remote memory very poor. Vitals:    01/27/22 1259   BP: 116/72   Site: Right Upper Arm   Position: Sitting   Cuff Size: Small Adult   Pulse: 71   Resp: 16   SpO2: 97%   Weight: 170 lb (77.1 kg)   Height: 5' 7\" (1.702 m)     BP Readings from Last 3 Encounters:   01/27/22 116/72   12/28/21 128/80   12/10/21 134/82     Pulse Readings from Last 3 Encounters:   01/27/22 71   12/28/21 66   12/10/21 67     Wt Readings from Last 3 Encounters:   01/27/22 170 lb (77.1 kg)   12/28/21 168 lb (76.2 kg)   12/10/21 168 lb (76.2 kg)     Body mass index is 26.63 kg/m². Patient's complete Health Risk Assessment and screening values have been reviewed and are found in Flowsheets. The following problems were reviewed today and where indicated follow up appointments were made and/or referrals ordered.     Positive Risk Factor Screenings with Interventions:     Fall Risk:  2 or more falls in past year?: (!) yes  Fall with injury in past year?: no  Fall Risk Interventions:    · Home safety tips provided          General Health and ACP:  General  In general, how would you say your health is?: (!) Poor  In the past 7 days, have you experienced any of the following? New or Increased Pain, New or Increased Fatigue, Loneliness, Social Isolation, Stress or Anger?: None of These  Do you get the social and emotional support that you need?: Yes  Do you have a Living Will?: Yes  Advance Directives     Power of  Living Will ACP-Advance Directive ACP-Power of     Not on File Filed on 07/13/13 Filed Not on File      General Health Risk Interventions:  · Poor self-assessment of health status: Parkinson's / Dementia make it worse - has health aid. Health Habits/Nutrition:  Health Habits/Nutrition  Do you exercise for at least 20 minutes 2-3 times per week?: (!) No  Have you lost any weight without trying in the past 3 months?: No  Do you eat only one meal per day?: No  Have you seen the dentist within the past year?: Yes  Body mass index: (!) 26.62  Health Habits/Nutrition Interventions:  · Inadequate physical activity:  can't exercise  · Nutritional issues:  goal is to maintain weight.     Hearing/Vision:  No exam data present  Hearing/Vision  Do you or your family notice any trouble with your hearing that hasn't been managed with hearing aids?: (!) Yes  Do you have difficulty driving, watching TV, or doing any of your daily activities because of your eyesight?: (!) Yes  Have you had an eye exam within the past year?: Yes  Hearing/Vision Interventions:  · Hearing concerns:  patient declines any further evaluation/treatment for hearing issues  · Vision concerns:  patient declines any further evaluation/treatment for this issue     ADL:  ADLs  In the past 7 days, did you need help from others to perform any of the following everyday activities? Eating, dressing, grooming, bathing, toileting, or walking/balance?: (!) Dressing,Grooming,Bathing,Toileting,Walking/Balance  In the past 7 days, did you need help from others to take care of any of the following? Laundry, housekeeping, banking/finances, shopping, telephone use, food preparation, transportation, or taking medications?: (!) Laundry,Housekeeping,Banking/Finances,Shopping,Telephone Use,Food Preparation,Transportation,Taking Medications  ADL Interventions:  · has home health health aid and spouse.     Personalized Preventive Plan   Current Health Maintenance Status  Immunization History   Administered Date(s) Administered    COVID-19, Aurther Dyers, Primary or Immunocompromised, PF, 100mcg/0.5mL 01/26/2021, 02/23/2021, 12/16/2021    DTaP 11/15/2006, 10/19/2010    Influenza Vaccine, unspecified formulation 11/10/2014    Influenza Virus Vaccine 12/15/2008, 11/10/2014    Influenza Whole 12/15/2008    Influenza, High Dose (Fluzone 65 yrs and older) 11/10/2014, 10/30/2015, 11/18/2016, 10/01/2017, 09/26/2018, 10/16/2020    Influenza, Quadv, adjuvanted, 65 yrs +, IM, PF (Fluad) 10/16/2020, 11/23/2021    Influenza, Triv, inactivated, subunit, adjuvanted, IM (Fluad 65 yrs and older) 10/18/2017, 11/20/2019    Pneumococcal Conjugate 13-valent (Ipjgvkg20) 11/18/2016    Pneumococcal Conjugate 7-valent (Prevnar7) 01/30/2007    Pneumococcal Polysaccharide (Lnpfchoep12) 01/30/2007, 01/16/2018    Td, unspecified formulation 11/15/2006, 10/19/2010    Zoster Live (Zostavax) 02/04/2013    Zoster Recombinant (Shingrix) 09/26/2018, 02/04/2019        Health Maintenance   Topic Date Due    Annual Wellness Visit (AWV)  Never done    DTaP/Tdap/Td vaccine (3 - Tdap) 10/19/2020    Colon cancer screen colonoscopy  03/15/2021    Lipid screen  02/05/2022    Potassium monitoring  02/05/2022    Creatinine monitoring  02/05/2022    Depression Screen  01/24/2023    Flu vaccine  Completed    Shingles Vaccine  Completed    Pneumococcal 65+ years Vaccine  Completed    COVID-19 Vaccine  Completed    Hepatitis A vaccine  Aged Out    Hepatitis B vaccine  Aged Out    Hib vaccine  Aged Out    Meningococcal (ACWY) vaccine  Aged Out     Recommendations for [employer] Due: see orders and patient instructions/AVS.    Recommended screening schedule for the next 5-10 years is provided to the patient in written form: see Patient Instructions/AVS.     ASSESSMENT/PLAN  213    Patient Instructions     Escobar Oliver was seen today for medicare awv, hypertension and hyperlipidemia. Diagnoses and all orders for this visit:    Routine general medical examination at a health care facility  Health Maintenance Due   Topic Date Due    DTaP/Tdap/Td vaccine (3 - Tdap) 10/19/2020     Dementia with behavioral disturbance, unspecified dementia type (HCC)  -     TSH with Reflex; Future  Behavioral issues stable. Parkinson's disease ([facility] [address]  -     TSH with Reflex; Future  Per neurology. Benign prostatic hyperplasia with urinary frequency  Not causing incontinence per urology. Benign essential hypertension  -     TSH with Reflex; Future  On Lisinopril 40 mg. Continue checking at home. For patients in their late 66's and above in age the systolic blood pressure (top number) goal is 120-140 with occasional increases to 150's not concerning. Call if less than 100 as low blood pressures increase the risk of falls and injuries. (watch for stumbling). Call also if more than 180 once or staying above 160 more than 1/3 of the time. If systolic blood pressure is staying 100-120 we may need to decrease the blood pressure medicine. Call if the lower number (diastolic blood pressure) is more than 100. A much lower bottom number even in the 40's is not usually urgent. DDD (degenerative disc disease), lumbar  If back pain use topical like Diclofenac gel. Generalized weakness  -     TSH with Reflex;  Future  Continue home exercise. [place]'s disease  See dermatology. Use zinc oxide cream daily if open. Neutropenia, unspecified type ([address]  CBC. Meningioma (HCC)  Stable. Need for prophylactic vaccination against diphtheria-tetanus-pertussis (DTP)  -     Tdap (ADACEL) 5-2-15.5 LF-MCG/0.5 injection; Inject 0.5 mLs into the muscle once for 1 dose    Mixed hypertriglyceridemia  Cut Simvastatin 10 mg in 1/2 until runs out. Personalized Preventive Plan for Rock Garcia - 1/27/2022  Medicare offers a range of preventive health benefits. Some of the tests and screenings are paid in full while other may be subject to a deductible, co-insurance, and/or copay. Some of these benefits include a comprehensive review of your medical history including lifestyle, illnesses that may run in your family, and various assessments and screenings as appropriate. After reviewing your medical record and screening and assessments performed today your provider may have ordered immunizations, labs, imaging, and/or referrals for you. A list of these orders (if applicable) as well as your Preventive Care list are included within your After Visit Summary for your review. Other Preventive Recommendations:    · A preventive eye exam performed by an eye specialist is recommended every 1-2 years to screen for glaucoma; cataracts, macular degeneration, and other eye disorders. · A preventive dental visit is recommended every 6 months. · Try to get at least 150 minutes of exercise per week or 10,000 steps per day on a pedometer . · Order or download the FREE \"Exercise & Physical Activity: Your Everyday Guide\" from The [employer]. Call 0-538.899.3088 or search The [employer] online. · You need 8854-0636 mg of calcium and 1744-6358 IU of vitamin D per day.  It is possible to meet your calcium requirement with diet alone, but a vitamin D supplement is usually necessary to meet this goal.  · When exposed to the sun, use a sunscreen that protects against both UVA and UVB radiation with an SPF of 30 or greater. Reapply every 2 to 3 hours or after sweating, drying off with a towel, or swimming. · Always wear a seat belt when traveling in a car. Always wear a helmet when riding a bicycle or motorcycle.     Martinez Broody, DO

## 2022-01-27 NOTE — PATIENT INSTRUCTIONS
Christine Robertson was seen today for medicare awv, hypertension and hyperlipidemia. Diagnoses and all orders for this visit:    Routine general medical examination at a health care facility  Health Maintenance Due   Topic Date Due    DTaP/Tdap/Td vaccine (3 - Tdap) 10/19/2020     Dementia with behavioral disturbance, unspecified dementia type (HCC)  -     TSH with Reflex; Future  Behavioral issues stable. Parkinson's disease ([address]  -     TSH with Reflex; Future  Per neurology. Benign prostatic hyperplasia with urinary frequency  Not causing incontinence per urology. Benign essential hypertension  -     TSH with Reflex; Future  On Lisinopril 40 mg. Continue checking at home. For patients in their late 66's and above in age the systolic blood pressure (top number) goal is 120-140 with occasional increases to 150's not concerning. Call if less than 100 as low blood pressures increase the risk of falls and injuries. (watch for stumbling). Call also if more than 180 once or staying above 160 more than 1/3 of the time. If systolic blood pressure is staying 100-120 we may need to decrease the blood pressure medicine. Call if the lower number (diastolic blood pressure) is more than 100. A much lower bottom number even in the 40's is not usually urgent. DDD (degenerative disc disease), lumbar  If back pain use topical like Diclofenac gel. Generalized weakness  -     TSH with Reflex; Future  Continue home exercise. Jeremias's disease  See dermatology. Use zinc oxide cream daily if open. Neutropenia, unspecified type ([address]  CBC. Meningioma (HCC)  Stable. Need for prophylactic vaccination against diphtheria-tetanus-pertussis (DTP)  -     Tdap (ADACEL) 5-2-15.5 LF-MCG/0.5 injection; Inject 0.5 mLs into the muscle once for 1 dose    Mixed hypertriglyceridemia  Cut Simvastatin 10 mg in 1/2 until runs out.      Personalized Preventive Plan for Anastacio Alvarez - 1/27/2022  Medicare offers a range of preventive health benefits. Some of the tests and screenings are paid in full while other may be subject to a deductible, co-insurance, and/or copay. Some of these benefits include a comprehensive review of your medical history including lifestyle, illnesses that may run in your family, and various assessments and screenings as appropriate. After reviewing your medical record and screening and assessments performed today your provider may have ordered immunizations, labs, imaging, and/or referrals for you. A list of these orders (if applicable) as well as your Preventive Care list are included within your After Visit Summary for your review. Other Preventive Recommendations:    · A preventive eye exam performed by an eye specialist is recommended every 1-2 years to screen for glaucoma; cataracts, macular degeneration, and other eye disorders. · A preventive dental visit is recommended every 6 months. · Try to get at least 150 minutes of exercise per week or 10,000 steps per day on a pedometer . · Order or download the FREE \"Exercise & Physical Activity: Your Everyday Guide\" from [employer]. Call 6-464.746.3891 or search [employer] online. · You need 6114-3669 mg of calcium and 0032-6272 IU of vitamin D per day. It is possible to meet your calcium requirement with diet alone, but a vitamin D supplement is usually necessary to meet this goal.  · When exposed to the sun, use a sunscreen that protects against both UVA and UVB radiation with an SPF of 30 or greater. Reapply every 2 to 3 hours or after sweating, drying off with a towel, or swimming. · Always wear a seat belt when traveling in a car. Always wear a helmet when riding a bicycle or motorcycle.

## 2022-02-24 ENCOUNTER — TELEPHONE (OUTPATIENT)
Dept: FAMILY MEDICINE CLINIC | Age: 82
End: 2022-02-24

## 2022-02-24 NOTE — TELEPHONE ENCOUNTER
Patient is going to be moved to [place]. [address] will be sending over forms to be filled out.  JUST FYI

## 2022-02-28 ENCOUNTER — TELEPHONE (OUTPATIENT)
Dept: FAMILY MEDICINE CLINIC | Age: 82
End: 2022-02-28

## 2022-02-28 DIAGNOSIS — F03.91 DEMENTIA WITH BEHAVIORAL DISTURBANCE, UNSPECIFIED DEMENTIA TYPE: Chronic | ICD-10-CM

## 2022-02-28 DIAGNOSIS — R73.03 PREDIABETES: Chronic | ICD-10-CM

## 2022-02-28 DIAGNOSIS — M48.061 SPINAL STENOSIS OF LUMBAR REGION WITHOUT NEUROGENIC CLAUDICATION: Chronic | ICD-10-CM

## 2022-02-28 NOTE — TELEPHONE ENCOUNTER
----- Message from [facility] sent at 2/28/2022  4:56 PM EST -----  Subject: Message to Provider    QUESTIONS  Information for Provider? Pt's wife called in wanting to know if PCP had   received pt's history physical. She said that is the next step for him to   be able to go to [facility]. Requesting call back. ---------------------------------------------------------------------------  --------------  Alex Danger INFO  What is the best way for the office to contact you? OK to leave message on   voicemail  Preferred Call Back Phone Number? [phone]  ---------------------------------------------------------------------------  --------------  SCRIPT ANSWERS  Relationship to Patient? Third Party  Representative Name?  Edmundo Sanchez

## 2022-03-01 ENCOUNTER — PATIENT MESSAGE (OUTPATIENT)
Dept: FAMILY MEDICINE CLINIC | Age: 82
End: 2022-03-01

## 2022-03-01 ENCOUNTER — TELEPHONE (OUTPATIENT)
Dept: FAMILY MEDICINE CLINIC | Age: 82
End: 2022-03-01

## 2022-03-01 NOTE — TELEPHONE ENCOUNTER
----- Message from Jess Traylors sent at 3/1/2022  2:57 PM EST -----  Subject: Message to Provider    QUESTIONS  Information for Provider? Calling to check in to see if the paperwork has   been received/returned to [facility] (2-). They are   wanting to have him admitted there by Thursday, but needs that returned   before they can admit him.   ---------------------------------------------------------------------------  --------------  [address]  What is the best way for the office to contact you? OK to leave message on   voicemail, OK to respond with electronic message via [employer] portal (only   for patients who have registered [employer] account)  Preferred Call Back Phone Number? [phone]  ---------------------------------------------------------------------------  --------------  SCRIPT ANSWERS  Relationship to Patient? Other  Representative Name? Mulu Rivas  Is the Representative on the appropriate HIPAA document in Epic?  Yes

## 2022-03-02 PROBLEM — H93.13 SUBJECTIVE TINNITUS OF BOTH EARS: Chronic | Status: ACTIVE | Noted: 2017-11-01

## 2022-03-02 PROBLEM — G20 PARKINSON'S DISEASE (HCC): Chronic | Status: ACTIVE | Noted: 2022-01-02

## 2022-03-02 PROBLEM — K59.01 SLOW TRANSIT CONSTIPATION: Chronic | Status: ACTIVE | Noted: 2021-12-10

## 2022-03-02 PROBLEM — G20.A1 PARKINSON'S DISEASE: Chronic | Status: ACTIVE | Noted: 2022-01-02

## 2022-03-02 PROBLEM — F03.91 DEMENTIA WITH BEHAVIORAL DISTURBANCE, UNSPECIFIED DEMENTIA TYPE: Chronic | Status: ACTIVE | Noted: 2022-01-02

## 2022-03-02 NOTE — TELEPHONE ENCOUNTER
FAXED PAPERWORK  [facility]. CALLED AND SPOKE TO WIFE AND LET HER KNOW THIS WAS DONE.  SC

## 2022-03-02 NOTE — TELEPHONE ENCOUNTER
DR Dixon Abreu YOU HANDLED THIS MESSAGE YET?  THANKS [address]    DETAILED ROUTING HISTORY OF                                                Telephone Encounter for Shante Kwon                         Created by: Magaly Doan MA                    on Mon Feb 28, 2022  5:44 PM       Routed by: Spindale Franki, [address] [place]                    on Mon Feb 28, 2022  5:45 PM       Routed to: Iris Tinajero DO      Janelle: Routine  on Mon Feb 28, 2022  5:45 PM    Opened by: Iris Tinajero DO                    on Mon Feb 28, 2022  6:41 PM       Opened by: Iris Tinajero DO                    on Tue Mar 1, 2022 10:58 AM                                        End of Report

## 2022-03-04 ENCOUNTER — PATIENT MESSAGE (OUTPATIENT)
Dept: FAMILY MEDICINE CLINIC | Age: 82
End: 2022-03-04

## 2022-03-04 NOTE — TELEPHONE ENCOUNTER
From: Gertrudis Wilder  To: Dr. Dylan Gage: 3/4/2022 8:10 AM EST  Subject: Darlen Safe Dr Cassandra Dumont was admitted to [facility] yesterday, and seemed to be ok. He wasnt able to understand what was happening. His ability to move his legs deteriorated rapidly, and I could no longer manage him physically. Thank you so much for the excellent care you have provided through the years for him. Thank you for all the extras you have provided.      Sincerely,  Maribeth Tinajero

## 2022-05-14 PROBLEM — Z51.5 HOSPICE CARE: Status: ACTIVE | Noted: 2022-02-16

## 2023-10-03 PROBLEM — F03.918 DEMENTIA WITH BEHAVIORAL DISTURBANCE (HCC): Chronic | Status: ACTIVE | Noted: 2022-01-02

## 2024-02-29 ENCOUNTER — CARE COORDINATION (OUTPATIENT)
Dept: CASE MANAGEMENT | Age: 84
End: 2024-02-29

## 2024-02-29 NOTE — CARE COORDINATION
Per Vernell pt  at [facility] on 24       Naomie Fallon, BSN, RN   [facility] Transition Nurse  [phone]

## 2024-02-29 NOTE — CARE COORDINATION
Care Transitions Post-Acute Facility Update Call    2024    Patient: Milton Baker Patient : 1940   MRN: [id number]  Reason for Admission:   Discharge Date: 3/30/15 RARS: No data recorded    Per patientping patient  at [facility] on 41.